# Patient Record
Sex: FEMALE | Race: WHITE | NOT HISPANIC OR LATINO | Employment: FULL TIME | URBAN - METROPOLITAN AREA
[De-identification: names, ages, dates, MRNs, and addresses within clinical notes are randomized per-mention and may not be internally consistent; named-entity substitution may affect disease eponyms.]

---

## 2020-01-10 ENCOUNTER — HOSPITAL ENCOUNTER (INPATIENT)
Facility: HOSPITAL | Age: 35
LOS: 3 days | Discharge: HOME | DRG: 699 | End: 2020-01-13
Attending: INTERNAL MEDICINE | Admitting: INTERNAL MEDICINE
Payer: MEDICAID

## 2020-01-10 DIAGNOSIS — N39.0 UTI (URINARY TRACT INFECTION): ICD-10-CM

## 2020-01-10 DIAGNOSIS — A49.9 ESBL (EXTENDED SPECTRUM BETA-LACTAMASE) PRODUCING BACTERIA INFECTION: ICD-10-CM

## 2020-01-10 DIAGNOSIS — Z16.12 ESBL (EXTENDED SPECTRUM BETA-LACTAMASE) PRODUCING BACTERIA INFECTION: ICD-10-CM

## 2020-01-10 PROBLEM — N31.9 NEUROGENIC BLADDER: Status: ACTIVE | Noted: 2020-01-10

## 2020-01-10 PROBLEM — R78.81 BACTEREMIA: Status: ACTIVE | Noted: 2020-01-10

## 2020-01-10 LAB
BASOPHILS # BLD AUTO: 0.01 K/UL (ref 0–0.2)
BASOPHILS NFR BLD: 0.2 % (ref 0–1.9)
DIFFERENTIAL METHOD: ABNORMAL
EOSINOPHIL # BLD AUTO: 0 K/UL (ref 0–0.5)
EOSINOPHIL NFR BLD: 0.7 % (ref 0–8)
ERYTHROCYTE [DISTWIDTH] IN BLOOD BY AUTOMATED COUNT: 13.6 % (ref 11.5–14.5)
HCT VFR BLD AUTO: 30.4 % (ref 37–48.5)
HGB BLD-MCNC: 9.6 G/DL (ref 12–16)
IMM GRANULOCYTES # BLD AUTO: 0.02 K/UL (ref 0–0.04)
IMM GRANULOCYTES NFR BLD AUTO: 0.3 % (ref 0–0.5)
LYMPHOCYTES # BLD AUTO: 2 K/UL (ref 1–4.8)
LYMPHOCYTES NFR BLD: 34.9 % (ref 18–48)
MCH RBC QN AUTO: 28.2 PG (ref 27–31)
MCHC RBC AUTO-ENTMCNC: 31.6 G/DL (ref 32–36)
MCV RBC AUTO: 89 FL (ref 82–98)
MONOCYTES # BLD AUTO: 0.5 K/UL (ref 0.3–1)
MONOCYTES NFR BLD: 8.2 % (ref 4–15)
NEUTROPHILS # BLD AUTO: 3.3 K/UL (ref 1.8–7.7)
NEUTROPHILS NFR BLD: 55.7 % (ref 38–73)
NRBC BLD-RTO: 0 /100 WBC
PLATELET # BLD AUTO: 356 K/UL (ref 150–350)
PMV BLD AUTO: 9.1 FL (ref 9.2–12.9)
RBC # BLD AUTO: 3.41 M/UL (ref 4–5.4)
WBC # BLD AUTO: 5.84 K/UL (ref 3.9–12.7)

## 2020-01-10 PROCEDURE — 21400001 HC TELEMETRY ROOM

## 2020-01-10 PROCEDURE — 83735 ASSAY OF MAGNESIUM: CPT

## 2020-01-10 PROCEDURE — 80053 COMPREHEN METABOLIC PANEL: CPT

## 2020-01-10 PROCEDURE — 36415 COLL VENOUS BLD VENIPUNCTURE: CPT

## 2020-01-10 PROCEDURE — 11000001 HC ACUTE MED/SURG PRIVATE ROOM

## 2020-01-10 PROCEDURE — 85025 COMPLETE CBC W/AUTO DIFF WBC: CPT

## 2020-01-10 RX ORDER — SODIUM CHLORIDE 0.9 % (FLUSH) 0.9 %
10 SYRINGE (ML) INJECTION
Status: DISCONTINUED | OUTPATIENT
Start: 2020-01-10 | End: 2020-01-13 | Stop reason: HOSPADM

## 2020-01-10 RX ORDER — SODIUM CHLORIDE 9 MG/ML
INJECTION, SOLUTION INTRAVENOUS CONTINUOUS
Status: DISCONTINUED | OUTPATIENT
Start: 2020-01-11 | End: 2020-01-13 | Stop reason: HOSPADM

## 2020-01-10 RX ORDER — ACETAMINOPHEN 325 MG/1
650 TABLET ORAL EVERY 8 HOURS PRN
Status: DISCONTINUED | OUTPATIENT
Start: 2020-01-10 | End: 2020-01-13 | Stop reason: HOSPADM

## 2020-01-10 RX ORDER — TALC
6 POWDER (GRAM) TOPICAL NIGHTLY PRN
Status: DISCONTINUED | OUTPATIENT
Start: 2020-01-11 | End: 2020-01-13 | Stop reason: HOSPADM

## 2020-01-10 RX ORDER — ONDANSETRON 2 MG/ML
4 INJECTION INTRAMUSCULAR; INTRAVENOUS EVERY 8 HOURS PRN
Status: DISCONTINUED | OUTPATIENT
Start: 2020-01-10 | End: 2020-01-13 | Stop reason: HOSPADM

## 2020-01-10 RX ORDER — MEROPENEM AND SODIUM CHLORIDE 500 MG/50ML
500 INJECTION, SOLUTION INTRAVENOUS
Status: DISCONTINUED | OUTPATIENT
Start: 2020-01-11 | End: 2020-01-12

## 2020-01-10 NOTE — PLAN OF CARE
(Physician in Lead of Transfers)   Outside Transfer Acceptance Note / Regional Referral Center      Transferring Physician: Isidoro Stallings Jr., MD    Accepting Physician: Annabelle Lane MD    Date of Acceptance: 01/10/2020    Transferring Facility: [facility]     Reason for Transfer: UTI, E.coli bacteremia     Report from Transferring Physician/Hospital course:  Patient is a 34 y.o. female who has past medical history of spina bifida presented with fever chills and weakness. She initially presented to free standing ER with these complaints 5 days ago and was found to have UTI. She then signed out AMA and was discharged with oral cipro. Blood cultures drawn during that ER visit came back positive today for MDR E.Coli. ED staff called patient back to present to ED since culture was resistant to antibiotics she was discharged with. She was febrile 100.2 and tachycardic upon presentation. She was started on IV meropenem and given IVF's. Lactic acid was 1.6. UA was again positive for nitrates, Leuk est and bacteria. She is otherwise clinically and hemodynamically stable. Patient will be admitted for IV abx treatment for MDR E.coli UTI.       Vital Signs (Most Recent):    138/79  99.0  91  18  100%RA  77KG   Vital Signs (24h Range):  BP: ()/()   Arterial Line BP: ()/()        Significant Labs:     Lactic acid 1.6    BUN/Cr 13/1.0    H/H 10/33      To Do List:   1. Admit to   2. Cont IV meropenem   3. Contact isolation for MDR bacteremia  4. Consult ID   5. Trend lactic acid  6. Repeat BC       Annabelle Lane MD  Hospital Medicine Staff

## 2020-01-11 PROBLEM — N39.0 UTI (URINARY TRACT INFECTION): Status: ACTIVE | Noted: 2020-01-11

## 2020-01-11 LAB
ALBUMIN SERPL BCP-MCNC: 2.6 G/DL (ref 3.5–5.2)
ALP SERPL-CCNC: 64 U/L (ref 55–135)
ALT SERPL W/O P-5'-P-CCNC: 15 U/L (ref 10–44)
ANION GAP SERPL CALC-SCNC: 9 MMOL/L (ref 8–16)
AST SERPL-CCNC: 11 U/L (ref 10–40)
BASOPHILS # BLD AUTO: 0.03 K/UL (ref 0–0.2)
BASOPHILS NFR BLD: 0.5 % (ref 0–1.9)
BILIRUB SERPL-MCNC: 0.2 MG/DL (ref 0.1–1)
BUN SERPL-MCNC: 9 MG/DL (ref 6–20)
CALCIUM SERPL-MCNC: 8.8 MG/DL (ref 8.7–10.5)
CHLORIDE SERPL-SCNC: 107 MMOL/L (ref 95–110)
CO2 SERPL-SCNC: 25 MMOL/L (ref 23–29)
CREAT SERPL-MCNC: 0.9 MG/DL (ref 0.5–1.4)
DIFFERENTIAL METHOD: ABNORMAL
EOSINOPHIL # BLD AUTO: 0.1 K/UL (ref 0–0.5)
EOSINOPHIL NFR BLD: 1.4 % (ref 0–8)
ERYTHROCYTE [DISTWIDTH] IN BLOOD BY AUTOMATED COUNT: 13.7 % (ref 11.5–14.5)
EST. GFR  (AFRICAN AMERICAN): >60 ML/MIN/1.73 M^2
EST. GFR  (NON AFRICAN AMERICAN): >60 ML/MIN/1.73 M^2
GLUCOSE SERPL-MCNC: 154 MG/DL (ref 70–110)
HCT VFR BLD AUTO: 34 % (ref 37–48.5)
HGB BLD-MCNC: 10.1 G/DL (ref 12–16)
IMM GRANULOCYTES # BLD AUTO: 0.03 K/UL (ref 0–0.04)
IMM GRANULOCYTES NFR BLD AUTO: 0.5 % (ref 0–0.5)
LYMPHOCYTES # BLD AUTO: 2.1 K/UL (ref 1–4.8)
LYMPHOCYTES NFR BLD: 36.7 % (ref 18–48)
MAGNESIUM SERPL-MCNC: 1.7 MG/DL (ref 1.6–2.6)
MCH RBC QN AUTO: 27.3 PG (ref 27–31)
MCHC RBC AUTO-ENTMCNC: 29.7 G/DL (ref 32–36)
MCV RBC AUTO: 92 FL (ref 82–98)
MONOCYTES # BLD AUTO: 0.6 K/UL (ref 0.3–1)
MONOCYTES NFR BLD: 10.7 % (ref 4–15)
NEUTROPHILS # BLD AUTO: 2.9 K/UL (ref 1.8–7.7)
NEUTROPHILS NFR BLD: 50.2 % (ref 38–73)
NRBC BLD-RTO: 0 /100 WBC
PLATELET # BLD AUTO: 392 K/UL (ref 150–350)
PMV BLD AUTO: 9.2 FL (ref 9.2–12.9)
POTASSIUM SERPL-SCNC: 3.9 MMOL/L (ref 3.5–5.1)
PROT SERPL-MCNC: 6.5 G/DL (ref 6–8.4)
RBC # BLD AUTO: 3.7 M/UL (ref 4–5.4)
SODIUM SERPL-SCNC: 141 MMOL/L (ref 136–145)
WBC # BLD AUTO: 5.81 K/UL (ref 3.9–12.7)

## 2020-01-11 PROCEDURE — 94761 N-INVAS EAR/PLS OXIMETRY MLT: CPT

## 2020-01-11 PROCEDURE — 63600175 PHARM REV CODE 636 W HCPCS: Performed by: INTERNAL MEDICINE

## 2020-01-11 PROCEDURE — 25000003 PHARM REV CODE 250: Performed by: NURSE PRACTITIONER

## 2020-01-11 PROCEDURE — 87040 BLOOD CULTURE FOR BACTERIA: CPT | Mod: 59

## 2020-01-11 PROCEDURE — 85025 COMPLETE CBC W/AUTO DIFF WBC: CPT

## 2020-01-11 PROCEDURE — 36415 COLL VENOUS BLD VENIPUNCTURE: CPT

## 2020-01-11 PROCEDURE — 11000001 HC ACUTE MED/SURG PRIVATE ROOM

## 2020-01-11 PROCEDURE — 63600175 PHARM REV CODE 636 W HCPCS: Performed by: NURSE PRACTITIONER

## 2020-01-11 RX ADMIN — Medication 6 MG: at 09:01

## 2020-01-11 RX ADMIN — MEROPENEM AND SODIUM CHLORIDE 500 MG: 500 INJECTION, SOLUTION INTRAVENOUS at 01:01

## 2020-01-11 RX ADMIN — MEROPENEM AND SODIUM CHLORIDE 500 MG: 500 INJECTION, SOLUTION INTRAVENOUS at 06:01

## 2020-01-11 RX ADMIN — ACETAMINOPHEN 650 MG: 325 TABLET ORAL at 04:01

## 2020-01-11 RX ADMIN — Medication 6 MG: at 12:01

## 2020-01-11 RX ADMIN — SODIUM CHLORIDE: 0.9 INJECTION, SOLUTION INTRAVENOUS at 12:01

## 2020-01-11 RX ADMIN — MEROPENEM 2 G: 1 INJECTION, POWDER, FOR SOLUTION INTRAVENOUS at 12:01

## 2020-01-11 RX ADMIN — MEROPENEM AND SODIUM CHLORIDE 500 MG: 500 INJECTION, SOLUTION INTRAVENOUS at 11:01

## 2020-01-11 RX ADMIN — SODIUM CHLORIDE: 0.9 INJECTION, SOLUTION INTRAVENOUS at 03:01

## 2020-01-11 NOTE — ASSESSMENT & PLAN NOTE
Source UTI  Repeat blood cultures x 2 in AM  Continue IV Meropenum  Inpatient consult to Infectious Disease

## 2020-01-11 NOTE — PROGRESS NOTES
[facility] Hospital Medicine  Progress Note    Patient Name: Addis Delvalle  MRN: [id number]  Patient Class: IP- Inpatient   Admission Date: 1/10/2020  Length of Stay: 1 days  Attending Physician: Jeremias Prather MD  Primary Care Provider: Madie Horn MD        Subjective:     Principal Problem:ESBL (extended spectrum beta-lactamase) producing bacteria infection        HPI:  Addis Delvalle 34 y.o. female  with a PMHx of spina bifida, neurogenic bladder with suprapubic catheter, and recurrent UTIs presented  to ED after being informed of abnormal blood culture, 01/05/20. She initially presented to free standing ER with these complaints 5 days ago and was found to have UTI. Admission was recommended at that time but she declined and elected to have oral abx. She was discharged with oral cipro. Blood cultures drawn during that ER visit came back positive today for MDR E.Coli. ED staff called patient back to present to ED since culture was resistant to antibiotics she was discharged with. She was febrile 100.2 and tachycardic upon presentation. She was started on IV meropenem and given IVF's. Lactic acid was 1.6. UA was again positive for nitrates, Leuk est and bacteria. She is otherwise clinically and hemodynamically stable. Patient will be admitted for IV abx treatment for MDR E.coli UTI.     Overview/Hospital Course:  Receiving IV merrem for E coli bacteremia (MDR). Infectious Ds consult pending. Repeat blood cultures pending. Pt performs self cath every 3 to 4 hours.     Interval History: Pt denies any symptoms currently. Discussed plan for treatment and possible disposition.     Review of Systems   Constitutional: Positive for activity change, chills and fever. Negative for appetite change.   HENT: Negative for trouble swallowing.    Eyes: Negative for visual disturbance.   Respiratory: Negative for apnea, cough, choking, chest tightness, shortness of breath, wheezing and stridor.     Cardiovascular: Negative for chest pain, palpitations and leg swelling.   Gastrointestinal: Negative for abdominal pain, constipation, diarrhea, nausea and vomiting.   Genitourinary: Positive for flank pain.   Musculoskeletal: Negative for back pain, neck pain and neck stiffness.   Skin: Negative for color change.   Neurological: Positive for weakness. Negative for dizziness, tremors, seizures, syncope, facial asymmetry, speech difficulty, light-headedness, numbness and headaches.   Psychiatric/Behavioral: Negative for agitation and behavioral problems.     Objective:     Vital Signs (Most Recent):  Temp: 98.5 °F (36.9 °C) (01/11/20 1235)  Pulse: 83 (01/11/20 1235)  Resp: 18 (01/11/20 1235)  BP: 115/62 (01/11/20 1235)  SpO2: 97 % (01/11/20 1235) Vital Signs (24h Range):  Temp:  [98.1 °F (36.7 °C)-99.4 °F (37.4 °C)] 98.5 °F (36.9 °C)  Pulse:  [74-97] 83  Resp:  [15-18] 18  SpO2:  [96 %-100 %] 97 %  BP: (115-138)/(56-79) 115/62     Weight: 77 kg (169 lb 12.1 oz)  Body mass index is 27.4 kg/m².    Intake/Output Summary (Last 24 hours) at 1/11/2020 1426  Last data filed at 1/11/2020 1133  Gross per 24 hour   Intake 1070 ml   Output 1350 ml   Net -280 ml      Physical Exam   Constitutional: She is oriented to person, place, and time. She appears well-developed. No distress.   HENT:   Head: Normocephalic and atraumatic.   Eyes: EOM are normal.   Neck: Normal range of motion. Neck supple.   Cardiovascular: Normal rate, regular rhythm and intact distal pulses.   Pulmonary/Chest: Effort normal and breath sounds normal. No stridor. No respiratory distress. She has no wheezes. She has no rales. She exhibits no tenderness.   Abdominal: Soft. Bowel sounds are normal. She exhibits no distension. There is no tenderness. There is no rebound and no guarding.   Genitourinary:   Genitourinary Comments: Deferred   Musculoskeletal: She exhibits no edema or tenderness.   Neurological: She is alert and oriented to person, place, and  time. No sensory deficit.   Skin: Skin is warm and dry. Capillary refill takes less than 2 seconds.   Psychiatric: She has a normal mood and affect. Her behavior is normal. Thought content normal.   Nursing note and vitals reviewed.      Significant Labs:   CBC:   Recent Labs   Lab 01/10/20  2312 01/11/20  0734   WBC 5.84 5.81   HGB 9.6* 10.1*   HCT 30.4* 34.0*   * 392*     CMP:   Recent Labs   Lab 01/10/20  2312      K 3.9      CO2 25   *   BUN 9   CREATININE 0.9   CALCIUM 8.8   PROT 6.5   ALBUMIN 2.6*   BILITOT 0.2   ALKPHOS 64   AST 11   ALT 15   ANIONGAP 9   EGFRNONAA >60     All pertinent labs within the past 24 hours have been reviewed.    Significant Imaging: I have reviewed all pertinent imaging results/findings within the past 24 hours.      Assessment/Plan:      * ESBL (extended spectrum beta-lactamase) producing bacteria infection  Med Surg admit  Blood cultures collected on 01/05/2020 -- (+) ESBL  Pt started on IV Meropenum at [facility] today, 01/10/20. Discussed with ID, will continue IV abx for now  Inpatient consult to Infectious Disease  Repeat blood cultures x 2 in AM        UTI (urinary tract infection)        Neurogenic bladder  Hx of spina bifida, neurogenic bladder with suprapubic catheter, and recurrent UTIs  Suprapubic catheter, self caths -- continue  Likely source of bacteremia      Bacteremia  Source UTI  Repeat blood cultures x 2 in AM  Continue IV Meropenum  Inpatient consult to Infectious Disease        VTE Risk Mitigation (From admission, onward)         Ordered     IP VTE LOW RISK PATIENT  Once      01/10/20 2211     Place sequential compression device  Until discontinued      01/10/20 2211                      Yolanda Quinn NP  Department of Hospital Medicine   [facility]

## 2020-01-11 NOTE — SUBJECTIVE & OBJECTIVE
Past Medical History:   Diagnosis Date    Spina bifida        History reviewed. No pertinent surgical history.    Review of patient's allergies indicates:  No Known Allergies    No current facility-administered medications on file prior to encounter.      No current outpatient medications on file prior to encounter.     Family History     Reviewed. Not pertinent        Tobacco Use    Smoking status: Never Smoker    Smokeless tobacco: Never Used   Substance and Sexual Activity    Alcohol use: Not on file    Drug use: Not on file    Sexual activity: Not on file     Review of Systems   Constitutional: Positive for activity change, chills and fever. Negative for appetite change.   HENT: Negative for trouble swallowing.    Eyes: Negative for visual disturbance.   Respiratory: Negative for apnea, cough, choking, chest tightness, shortness of breath, wheezing and stridor.    Cardiovascular: Negative for chest pain, palpitations and leg swelling.   Gastrointestinal: Negative for abdominal pain, constipation, diarrhea, nausea and vomiting.   Genitourinary: Positive for flank pain.   Musculoskeletal: Negative for back pain, neck pain and neck stiffness.   Skin: Negative for color change.   Neurological: Positive for weakness. Negative for dizziness, tremors, seizures, syncope, facial asymmetry, speech difficulty, light-headedness, numbness and headaches.   Psychiatric/Behavioral: Negative for agitation and behavioral problems.     Objective:     Vital Signs (Most Recent):  Temp: 99.2 °F (37.3 °C) (01/10/20 2100)  Pulse: 90 (01/10/20 2100)  Resp: 15 (01/10/20 2100)  BP: 129/66 (01/10/20 2100)  SpO2: 96 % (01/10/20 2100) Vital Signs (24h Range):  Temp:  [99 °F (37.2 °C)-99.4 °F (37.4 °C)] 99.2 °F (37.3 °C)  Pulse:  [87-91] 90  Resp:  [15-18] 15  SpO2:  [96 %-100 %] 96 %  BP: (129-138)/(66-79) 129/66     Weight: 77 kg (169 lb 12.1 oz)  Body mass index is 27.4 kg/m².    Physical Exam   Constitutional: She is oriented to  person, place, and time. She appears well-developed. No distress.   HENT:   Head: Normocephalic and atraumatic.   Eyes: EOM are normal.   Neck: Normal range of motion. Neck supple.   Cardiovascular: Normal rate, regular rhythm and intact distal pulses.   Pulmonary/Chest: Effort normal and breath sounds normal. No stridor. No respiratory distress. She has no wheezes. She has no rales. She exhibits no tenderness.   Abdominal: Soft. Bowel sounds are normal. She exhibits no distension. There is no tenderness. There is no rebound and no guarding.   Genitourinary:   Genitourinary Comments: Deferred   Musculoskeletal: She exhibits no edema or tenderness.   Neurological: She is alert and oriented to person, place, and time. No sensory deficit.   Skin: Skin is warm and dry. Capillary refill takes less than 2 seconds.   Psychiatric: She has a normal mood and affect. Her behavior is normal. Thought content normal.   Nursing note and vitals reviewed.        CRANIAL NERVES     CN III, IV, VI   Extraocular motions are normal.        Significant Labs: All pertinent labs within the past 24 hours have been reviewed.    Significant Imaging: I have reviewed all pertinent imaging results/findings within the past 24 hours.

## 2020-01-11 NOTE — ASSESSMENT & PLAN NOTE
Med Surg admit  Blood cultures collected on 01/05/2020 -- (+) ESBL  Pt started on IV Meropenum at [facility] today, 01/10/20. Discussed with ID, will continue IV abx for now  Inpatient consult to Infectious Disease  Repeat blood cultures x 2 in AM

## 2020-01-11 NOTE — H&P
[facility] Hospital Medicine  History & Physical    Patient Name: Addis Delvalle  MRN: [id number]  Admission Date: 1/10/2020  Attending Physician: Peter Pagan MD   Primary Care Provider: Madie Horn MD         Patient information was obtained from patient and ER records.     Subjective:     Principal Problem:ESBL (extended spectrum beta-lactamase) producing bacteria infection    Chief Complaint: No chief complaint on file.       HPI: Addis Delvalle 34 y.o. female  with a PMHx of spina bifida, neurogenic bladder with suprapubic catheter, and recurrent UTIs presented  to ED after being informed of abnormal blood culture, 01/05/20. She initially presented to free standing ER with these complaints 5 days ago and was found to have UTI. Admission was recommended at that time but she declined and elected to have oral abx. She was discharged with oral cipro. Blood cultures drawn during that ER visit came back positive today for MDR E.Coli. ED staff called patient back to present to ED since culture was resistant to antibiotics she was discharged with. She was febrile 100.2 and tachycardic upon presentation. She was started on IV meropenem and given IVF's. Lactic acid was 1.6. UA was again positive for nitrates, Leuk est and bacteria. She is otherwise clinically and hemodynamically stable. Patient will be admitted for IV abx treatment for MDR E.coli UTI.     Past Medical History:   Diagnosis Date    Spina bifida        History reviewed. No pertinent surgical history.    Review of patient's allergies indicates:  No Known Allergies    No current facility-administered medications on file prior to encounter.      No current outpatient medications on file prior to encounter.     Family History     Reviewed. Not pertinent        Tobacco Use    Smoking status: Never Smoker    Smokeless tobacco: Never Used   Substance and Sexual Activity    Alcohol use: Not on file    Drug use: Not on file     Sexual activity: Not on file     Review of Systems   Constitutional: Positive for activity change, chills and fever. Negative for appetite change.   HENT: Negative for trouble swallowing.    Eyes: Negative for visual disturbance.   Respiratory: Negative for apnea, cough, choking, chest tightness, shortness of breath, wheezing and stridor.    Cardiovascular: Negative for chest pain, palpitations and leg swelling.   Gastrointestinal: Negative for abdominal pain, constipation, diarrhea, nausea and vomiting.   Genitourinary: Positive for flank pain.   Musculoskeletal: Negative for back pain, neck pain and neck stiffness.   Skin: Negative for color change.   Neurological: Positive for weakness. Negative for dizziness, tremors, seizures, syncope, facial asymmetry, speech difficulty, light-headedness, numbness and headaches.   Psychiatric/Behavioral: Negative for agitation and behavioral problems.     Objective:     Vital Signs (Most Recent):  Temp: 99.2 °F (37.3 °C) (01/10/20 2100)  Pulse: 90 (01/10/20 2100)  Resp: 15 (01/10/20 2100)  BP: 129/66 (01/10/20 2100)  SpO2: 96 % (01/10/20 2100) Vital Signs (24h Range):  Temp:  [99 °F (37.2 °C)-99.4 °F (37.4 °C)] 99.2 °F (37.3 °C)  Pulse:  [87-91] 90  Resp:  [15-18] 15  SpO2:  [96 %-100 %] 96 %  BP: (129-138)/(66-79) 129/66     Weight: 77 kg (169 lb 12.1 oz)  Body mass index is 27.4 kg/m².    Physical Exam   Constitutional: She is oriented to person, place, and time. She appears well-developed. No distress.   HENT:   Head: Normocephalic and atraumatic.   Eyes: EOM are normal.   Neck: Normal range of motion. Neck supple.   Cardiovascular: Normal rate, regular rhythm and intact distal pulses.   Pulmonary/Chest: Effort normal and breath sounds normal. No stridor. No respiratory distress. She has no wheezes. She has no rales. She exhibits no tenderness.   Abdominal: Soft. Bowel sounds are normal. She exhibits no distension. There is no tenderness. There is no rebound and no  guarding.   Genitourinary:   Genitourinary Comments: Deferred   Musculoskeletal: She exhibits no edema or tenderness.   Neurological: She is alert and oriented to person, place, and time. No sensory deficit.   Skin: Skin is warm and dry. Capillary refill takes less than 2 seconds.   Psychiatric: She has a normal mood and affect. Her behavior is normal. Thought content normal.   Nursing note and vitals reviewed.        CRANIAL NERVES     CN III, IV, VI   Extraocular motions are normal.        Significant Labs: All pertinent labs within the past 24 hours have been reviewed.    Significant Imaging: I have reviewed all pertinent imaging results/findings within the past 24 hours.    Assessment/Plan:     * ESBL (extended spectrum beta-lactamase) producing bacteria infection  Med Surg admit  Blood cultures collected on 01/05/2020 -- (+) ESBL  Pt started on IV Meropenum at [facility] today, 01/10/20. Discussed with ID, will continue IV abx for now  Inpatient consult to Infectious Disease  Repeat blood cultures x 2 in AM        Neurogenic bladder  Hx of spina bifida, neurogenic bladder with suprapubic catheter, and recurrent UTIs  Suprapubic catheter, self caths -- continue  Likely source of bacteremia      Bacteremia  Source UTI  Repeat blood cultures x 2 in AM  Continue IV Meropenum  Inpatient consult to Infectious Disease        VTE Risk Mitigation (From admission, onward)         Ordered     IP VTE LOW RISK PATIENT  Once      01/10/20 2211     Place sequential compression device  Until discontinued      01/10/20 2211                   CAITIE De Dios  Department of Hospital Medicine   [facility]

## 2020-01-11 NOTE — HPI
Addis Delvalle 34 y.o. female with a PMHx of spina bifida, neurogenic bladder with suprapubic catheter, and recurrent UTIs presented to ED after being informed of abnormal blood culture, 01/05/20. She initially presented to free standing ER with these complaints 5 days ago and was found to have UTI. Admission was recommended at that time but she declined and elected to have oral abx. She was discharged with oral cipro. Blood cultures drawn during that ER visit came back positive today for MDR E.Coli. ED staff called patient back to present to ED since culture was resistant to antibiotics she was discharged with. She was febrile 100.2 and tachycardic upon presentation. She was started on IV meropenem and given IVF's. Lactic acid was 1.6. UA was again positive for nitrates, Leuk est and bacteria. She is otherwise clinically and hemodynamically stable. Patient will be admitted for IV abx treatment for MDR E.coli UTI.

## 2020-01-11 NOTE — PLAN OF CARE
SW with patient at bedside. Patient is independent with adls and iadls. Pt reports having financial difficulty affording self catheter supplies. Pt previously under her father's insurance which did not cover needed self catheter supplies. Pt now has Medicaid and advised that she would contact insurance company to see if her insurance will cover needed supplies. SW TC to [employer] where pt purchases needed supplies to inquire about insurance coverage for catheter supplies. SW was advised that pt's insurance will not cover supplies at that particular pharmacy, but advised that pt can purchase supplies at out of pocket cost of $30 plus tax. Pt reports that due to financial limitations, she as been cleaning/washing her catheters and reusing them. SW undated pt and advised that she contact her insurance company to enquire about coverage for needed supplies. SW also informed pt about dates for open enrollment with Medicaid as she expressed that she is not satisfied with current insurance provider and would like to change her provider. Updated white board with 's name and number. Transitional Care Folder, Discharge Planning Begins on Admission pamphlet, [facility] Bedside Delivery pamphlet, Advance Directive information given to patient along with the contact information given.Instructed patient to call with any questions or concerns.     D/C Plan: Pt will return home to prior living arrangement  PCP: Madie Horn MD  Preferred Pharmacy: [facility], [address] [place] [address], [place] [address]  Discharge transportation: Family will provide transportation home  [facility]: Inactive  Pharmacy Bedside Delivery: Pt would like bedside medication delivery     01/11/20 1126   Discharge Assessment   Assessment Type Discharge Planning Assessment   Confirmed/corrected address and phone number on facesheet? Yes   Assessment information obtained from? Patient   Expected Length of Stay (days)    (TBD)   Communicated expected length of stay with patient/caregiver yes   Prior to hospitilization cognitive status: Alert/Oriented   Prior to hospitalization functional status: Independent   Current cognitive status: Alert/Oriented   Current Functional Status: Independent   Facility Arrived From: Pt arrived by ambulance form [facility] in [place], [place]   Lives With other relative(s)   Able to Return to Prior Arrangements yes   Is patient able to care for self after discharge? Yes   Who are your caregiver(s) and their phone number(s)? Lana Delvalle (mother) [phone]   Patient's perception of discharge disposition home or selfcare   Readmission Within the Last 30 Days no previous admission in last 30 days   Patient currently being followed by outpatient case management? No   Patient currently receives any other outside agency services? No   Equipment Currently Used at Home none   Do you have any problems affording any of your prescribed medications? No   Is the patient taking medications as prescribed? yes   Does the patient have transportation home? Yes   Transportation Anticipated family or friend will provide   Does the patient receive services at the [facility] Clinic? No   Discharge Plan A Home   Discharge Plan B Home   DME Needed Upon Discharge  none   Patient/Family in Agreement with Plan yes

## 2020-01-11 NOTE — ASSESSMENT & PLAN NOTE
Hx of spina bifida, neurogenic bladder with suprapubic catheter, and recurrent UTIs  Suprapubic catheter, self caths -- continue  Likely source of bacteremia

## 2020-01-11 NOTE — HOSPITAL COURSE
Receiving IV merrem for E coli bacteremia (MDR). Infectious Ds consult pending. Repeat blood cultures pending. Pt performs self cath every 3 to 4 hours.   1/12/2020 - repeat blood cultures - NGTD. Infectious Ds determined plan of IV Ertapenem daily x 2 weeks. PICC line placed.  assisting with ABX. [employer] cannot teach patient until tomorrow 1/13/2020 - will plan for discharge then. Pt to follow up with Dr. English.  1/13/2020 - [employer] was able to perform bedside teaching for Ertapenem administration. Pt was seen and examined and determined to be safe and stable for discharge. Also, she was given a prescription for supply of in and out caths. Pt was advised to follow up with Dr. English and her PCP.

## 2020-01-11 NOTE — ASSESSMENT & PLAN NOTE
Med Surg admit  Blood cultures collected on 01/05/2020 -- (+) ESBL  Pt started on IV Meropenum at [facility] today, 01/10/20. Discussed with ID, will continue IV abx for now  Inpatient consult to Infectious Disease  Repeat blood cultures x 2 in AM

## 2020-01-11 NOTE — PLAN OF CARE
Pt denies pain. PT has remained afebrile. Iv fluids are infusing. IV given per order. Pt self cath ( pt has not supplies, given via hospital). No injuries. Will continue to monitor. 12 hour chart check is completed.

## 2020-01-12 PROBLEM — N12 PYELONEPHRITIS: Status: ACTIVE | Noted: 2020-01-12

## 2020-01-12 LAB
BASOPHILS # BLD AUTO: 0.02 K/UL (ref 0–0.2)
BASOPHILS NFR BLD: 0.3 % (ref 0–1.9)
DIFFERENTIAL METHOD: ABNORMAL
EOSINOPHIL # BLD AUTO: 0.1 K/UL (ref 0–0.5)
EOSINOPHIL NFR BLD: 1.7 % (ref 0–8)
ERYTHROCYTE [DISTWIDTH] IN BLOOD BY AUTOMATED COUNT: 13.5 % (ref 11.5–14.5)
HCT VFR BLD AUTO: 35.7 % (ref 37–48.5)
HGB BLD-MCNC: 10.8 G/DL (ref 12–16)
IMM GRANULOCYTES # BLD AUTO: 0.03 K/UL (ref 0–0.04)
IMM GRANULOCYTES NFR BLD AUTO: 0.4 % (ref 0–0.5)
LYMPHOCYTES # BLD AUTO: 2.2 K/UL (ref 1–4.8)
LYMPHOCYTES NFR BLD: 30.6 % (ref 18–48)
MCH RBC QN AUTO: 27.6 PG (ref 27–31)
MCHC RBC AUTO-ENTMCNC: 30.3 G/DL (ref 32–36)
MCV RBC AUTO: 91 FL (ref 82–98)
MONOCYTES # BLD AUTO: 0.6 K/UL (ref 0.3–1)
MONOCYTES NFR BLD: 8.5 % (ref 4–15)
NEUTROPHILS # BLD AUTO: 4.2 K/UL (ref 1.8–7.7)
NEUTROPHILS NFR BLD: 58.5 % (ref 38–73)
NRBC BLD-RTO: 0 /100 WBC
PLATELET # BLD AUTO: 374 K/UL (ref 150–350)
PMV BLD AUTO: 8.9 FL (ref 9.2–12.9)
RBC # BLD AUTO: 3.91 M/UL (ref 4–5.4)
WBC # BLD AUTO: 7.19 K/UL (ref 3.9–12.7)

## 2020-01-12 PROCEDURE — 63600175 PHARM REV CODE 636 W HCPCS: Performed by: INTERNAL MEDICINE

## 2020-01-12 PROCEDURE — 25000003 PHARM REV CODE 250: Performed by: NURSE PRACTITIONER

## 2020-01-12 PROCEDURE — 11000001 HC ACUTE MED/SURG PRIVATE ROOM

## 2020-01-12 PROCEDURE — C1751 CATH, INF, PER/CENT/MIDLINE: HCPCS

## 2020-01-12 PROCEDURE — 63600175 PHARM REV CODE 636 W HCPCS: Performed by: NURSE PRACTITIONER

## 2020-01-12 PROCEDURE — 36415 COLL VENOUS BLD VENIPUNCTURE: CPT

## 2020-01-12 PROCEDURE — 85025 COMPLETE CBC W/AUTO DIFF WBC: CPT

## 2020-01-12 PROCEDURE — 36569 INSJ PICC 5 YR+ W/O IMAGING: CPT

## 2020-01-12 RX ADMIN — SODIUM CHLORIDE: 0.9 INJECTION, SOLUTION INTRAVENOUS at 04:01

## 2020-01-12 RX ADMIN — Medication 6 MG: at 09:01

## 2020-01-12 RX ADMIN — MEROPENEM AND SODIUM CHLORIDE 500 MG: 500 INJECTION, SOLUTION INTRAVENOUS at 05:01

## 2020-01-12 RX ADMIN — ERTAPENEM 1 G: 1 INJECTION INTRAMUSCULAR; INTRAVENOUS at 11:01

## 2020-01-12 NOTE — PROCEDURES
"Addis Delvalle is a 34 y.o. female patient.    Temp: 98.3 °F (36.8 °C) (01/12/20 1234)  Pulse: 78 (01/12/20 1235)  Resp: 18 (01/12/20 1234)  BP: 122/69 (01/12/20 1235)  SpO2: 99 % (01/12/20 1234)  Weight: 77 kg (169 lb 12.1 oz) (01/10/20 2100)  Height: 5' 6" (167.6 cm) (01/10/20 2100)    PICC  Date/Time: 1/12/2020 12:26 PM  Performed by: Chris Ashley RN  Supervising provider: Twila Locke RN  Consent Done: Yes  Time out: Immediately prior to procedure a time out was called to verify the correct patient, procedure, equipment, support staff and site/side marked as required  Indications: med administration and vascular access  Anesthesia: local infiltration  Local anesthetic: lidocaine 1% without epinephrine  Anesthetic Total (mL): 3  Preparation: skin prepped with ChloraPrep  Skin prep agent dried: skin prep agent completely dried prior to procedure  Sterile barriers: all five maximum sterile barriers used - cap, mask, sterile gown, sterile gloves, and large sterile sheet  Hand hygiene: hand hygiene performed prior to central venous catheter insertion  Location details: right basilic  Catheter type: double lumen  Catheter size: 5 Fr  Catheter Length: 38cm    Ultrasound guidance: yes  Vessel Caliber: medium and patent, compressibility normal  Vascular Doppler: not done  Needle advanced into vessel with real time Ultrasound guidance.  Guidewire confirmed in vessel.  Sterile sheath used.  no esophageal manometryNumber of attempts: 1  Post-procedure: blood return through all ports, chlorhexidine patch and sterile dressing applied  Estimated blood loss (mL): 0  Specimens: No  Implants: No  Assessment: placement verified by x-ray  Tip Termination Explanation: see rad. report  Complications: none  Comments: Do not use until placement verified by MD Chris Ashley  1/12/2020    "

## 2020-01-12 NOTE — SUBJECTIVE & OBJECTIVE
Past Medical History:   Diagnosis Date    Spina bifida        History reviewed. No pertinent surgical history.    Review of patient's allergies indicates:  No Known Allergies    Medications:  No medications prior to admission.     Antibiotics (From admission, onward)    Start     Stop Route Frequency Ordered    01/11/20 0630  meropenem-0.9% sodium chloride 500 mg/50 mL IVPB      -- IV Every 6 hours (non-standard times) 01/10/20 2336        Antifungals (From admission, onward)    None        Antivirals (From admission, onward)    None             There is no immunization history on file for this patient.    Family History     None        Social History     Socioeconomic History    Marital status: Single     Spouse name: Not on file    Number of children: Not on file    Years of education: Not on file    Highest education level: Not on file   Occupational History    Not on file   Social Needs    Financial resource strain: Not on file    Food insecurity:     Worry: Not on file     Inability: Not on file    Transportation needs:     Medical: Not on file     Non-medical: Not on file   Tobacco Use    Smoking status: Never Smoker    Smokeless tobacco: Never Used   Substance and Sexual Activity    Alcohol use: Not on file    Drug use: Not on file    Sexual activity: Not on file   Lifestyle    Physical activity:     Days per week: Not on file     Minutes per session: Not on file    Stress: Not on file   Relationships    Social connections:     Talks on phone: Not on file     Gets together: Not on file     Attends Alevism service: Not on file     Active member of club or organization: Not on file     Attends meetings of clubs or organizations: Not on file     Relationship status: Not on file   Other Topics Concern    Not on file   Social History Narrative    Not on file     Review of Systems   Constitutional: Positive for activity change, chills and fever. Negative for appetite change.   HENT: Negative  for trouble swallowing.    Eyes: Negative for visual disturbance.   Respiratory: Negative for apnea, cough, choking, chest tightness, shortness of breath, wheezing and stridor.    Cardiovascular: Negative for chest pain, palpitations and leg swelling.   Gastrointestinal: Negative for abdominal pain, constipation, diarrhea, nausea and vomiting.   Genitourinary: Positive for flank pain.   Musculoskeletal: Negative for back pain, neck pain and neck stiffness.   Skin: Negative for color change.   Neurological: Positive for weakness. Negative for dizziness, tremors, seizures, syncope, facial asymmetry, speech difficulty, light-headedness, numbness and headaches.   Psychiatric/Behavioral: Negative for agitation and behavioral problems.     Objective:     Vital Signs (Most Recent):  Temp: 97.8 °F (36.6 °C) (01/12/20 0423)  Pulse: 78 (01/12/20 0423)  Resp: 18 (01/12/20 0423)  BP: 104/62 (01/12/20 0423)  SpO2: 96 % (01/12/20 0423) Vital Signs (24h Range):  Temp:  [97.8 °F (36.6 °C)-98.5 °F (36.9 °C)] 97.8 °F (36.6 °C)  Pulse:  [64-86] 78  Resp:  [16-18] 18  SpO2:  [96 %-100 %] 96 %  BP: (104-131)/(58-82) 104/62     Weight: 77 kg (169 lb 12.1 oz)  Body mass index is 27.4 kg/m².    Estimated Creatinine Clearance: 92.3 mL/min (based on SCr of 0.9 mg/dL).    Physical Exam   Constitutional: She is oriented to person, place, and time. She appears well-developed. No distress.   HENT:   Head: Normocephalic and atraumatic.   Eyes: EOM are normal.   Neck: Normal range of motion. Neck supple.   Cardiovascular: Normal rate, regular rhythm and intact distal pulses.   Pulmonary/Chest: Effort normal and breath sounds normal. No stridor. No respiratory distress. She has no wheezes. She has no rales. She exhibits no tenderness.   Abdominal: Soft. Bowel sounds are normal. She exhibits no distension. There is no tenderness. There is no rebound and no guarding.   Genitourinary:   Genitourinary Comments: Deferred   Musculoskeletal: She exhibits  no edema or tenderness.   Neurological: She is alert and oriented to person, place, and time. No sensory deficit.   Skin: Skin is warm and dry. Capillary refill takes less than 2 seconds.   Psychiatric: She has a normal mood and affect. Her behavior is normal. Thought content normal.   Nursing note and vitals reviewed.      Significant Labs: Urine Culture: No results for input(s): LABURIN in the last 4320 hours.  All pertinent labs within the past 24 hours have been reviewed.    Significant Imaging: I have reviewed all pertinent imaging results/findings within the past 24 hours.

## 2020-01-12 NOTE — PROGRESS NOTES
[facility]  Infectious Disease  Progress Note    Patient Name: Addis Delvalle  MRN: [id number]  Admission Date: 1/10/2020  Length of Stay: 2 days  Attending Physician: Jeremias Prather MD  Primary Care Provider: Madie Horn MD    Isolation Status: Contact  Assessment/Plan:      * ESBL (extended spectrum beta-lactamase) producing bacteria infection  Will continue meropenem, repeat blood culture .     1/12- will complete 2 weeks of Ertapenem -IM or IV option     Pyelonephritis  Will plan to complete 2 weeks of meropenem   Repeat blood culture    1/12- will switch to ertapenem   Will plan to complete 2 weeks of therapy -IM or IV.  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  IM option can be done .  She will need to come daily to get her IM shot .  PICC line can also be done and she gets once daily Ertapenem     Bacteremia  Isolate is MDR E coli-will continue meropenem     1/12- please see plan above        Anticipated Disposition:     Thank you for your consult. I will follow-up with patient. Please contact us if you have any additional questions.    Lui English MD  Infectious Disease  [facility]    Subjective:     Principal Problem:ESBL (extended spectrum beta-lactamase) producing bacteria infection    HPI:   34 y.o. female  with a PMHx of spina bifida, neurogenic bladder with suprapubic catheter, and recurrent UTIs presented  to ED after being informed of abnormal blood culture, 01/05/20. She initially presented to free standing ER with these complaints 5 days ago and was  discharged with oral cipro. Blood cultures drawn during that ER visit came back positive today for MDR E.Coli.  01/05-    Amikacin MARIANNE 8 ug/ml: Susceptible   Ampicillin MARIANNE >=32 ug/ml: Resistant   Ampicillin + Sulbactam MARIANNE >=32 ug/ml: Resistant   Ceftriaxone MARIANNE >=64 ug/ml: Resistant   ESBL MARIANNE pos ug/ml: Pos   Gentamicin MARIANNE >=16 ug/ml: Resistant   Levofloxacin MARIANNE >=8 ug/ml:  Resistant   Piperacillin + Tazobactam MARIANNE 8 ug/ml: Susceptible   Meropenem MARIANNE <=0.25 ug/ml: Susceptible   Tobramycin MARIANNE >=16 ug/ml: Resistant   Trimethoprim + Sulfamethoxazole MARIANNE <=20 ug/ml: Susceptible   Cefepime REID-DELGADILLO Resistant     CBC -1/5- wbc 25.7  CT scan of abdomen  -1/5-  1. Moderate to moderate severe dilation of calyces right kidney, greatest in the mid and upper pole. Multiple calculi within the right kidney measuring up to 17 mm. Diffuse cortical thinning of kidney. Heterogeneous enhancement of kidney with some minimal stranding suggesting polynephritis. Right ureter proximally and distally is nondilated but is somewhat distended the midportion. No calculi within the ureter.    2. Distended somewhat folded thickened urinary bladder having the appearance of neurogenic bladder.    3. Left kidney and ureter are not identified.     4. Multiple prominent contrast enhancing retroperitoneal lymph nodes and a few mildly prominent enhancing mesenteric lymph nodes which may be reactive or inflammatory/infectious in etiology. Moderate stool throughout large bowel.  Since admission,she was started on meropenem .  Interval History:   34 year old woman with spina bifida, ESBL E coli bacteremia .  Repeat blood culture is negative  Review of Systems   Constitutional: Positive for activity change, chills and fever. Negative for appetite change.   HENT: Negative for trouble swallowing.    Eyes: Negative for visual disturbance.   Respiratory: Negative for apnea, cough, choking, chest tightness, shortness of breath, wheezing and stridor.    Cardiovascular: Negative for chest pain, palpitations and leg swelling.   Gastrointestinal: Negative for abdominal pain, constipation, diarrhea, nausea and vomiting.   Genitourinary: Positive for flank pain.   Musculoskeletal: Negative for back pain, neck pain and neck stiffness.   Skin: Negative for color change.   Neurological: Positive for weakness. Negative for dizziness,  tremors, seizures, syncope, facial asymmetry, speech difficulty, light-headedness, numbness and headaches.   Psychiatric/Behavioral: Negative for agitation and behavioral problems.     Objective:     Vital Signs (Most Recent):  Temp: 97.8 °F (36.6 °C) (01/12/20 0423)  Pulse: 78 (01/12/20 0423)  Resp: 18 (01/12/20 0423)  BP: 104/62 (01/12/20 0423)  SpO2: 96 % (01/12/20 0423) Vital Signs (24h Range):  Temp:  [97.8 °F (36.6 °C)-98.5 °F (36.9 °C)] 97.8 °F (36.6 °C)  Pulse:  [64-86] 78  Resp:  [16-18] 18  SpO2:  [96 %-100 %] 96 %  BP: (104-130)/(58-82) 104/62     Weight: 77 kg (169 lb 12.1 oz)  Body mass index is 27.4 kg/m².    Estimated Creatinine Clearance: 92.3 mL/min (based on SCr of 0.9 mg/dL).    Physical Exam   Constitutional: She is oriented to person, place, and time. She appears well-developed. No distress.   HENT:   Head: Normocephalic and atraumatic.   Eyes: EOM are normal.   Neck: Normal range of motion. Neck supple.   Cardiovascular: Normal rate, regular rhythm and intact distal pulses.   Pulmonary/Chest: Effort normal and breath sounds normal. No stridor. No respiratory distress. She has no wheezes. She has no rales. She exhibits no tenderness.   Abdominal: Soft. Bowel sounds are normal. She exhibits no distension. There is no tenderness. There is no rebound and no guarding.   Genitourinary:   Genitourinary Comments: Deferred   Musculoskeletal: She exhibits no edema or tenderness.   Neurological: She is alert and oriented to person, place, and time. No sensory deficit.   Skin: Skin is warm and dry. Capillary refill takes less than 2 seconds.   Psychiatric: She has a normal mood and affect. Her behavior is normal. Thought content normal.   Nursing note and vitals reviewed.      Significant Labs:   Blood Culture:   Recent Labs   Lab 01/11/20  0743   LABBLOO No Growth to date  No Growth to date     BMP:   Recent Labs   Lab 01/10/20  2312   *      K 3.9      CO2 25   BUN 9   CREATININE 0.9    CALCIUM 8.8   MG 1.7     CBC:   Recent Labs   Lab 01/10/20  2312 01/11/20  0734 01/12/20  0542   WBC 5.84 5.81 7.19   HGB 9.6* 10.1* 10.8*   HCT 30.4* 34.0* 35.7*   * 392* 374*     Wound Culture: No results for input(s): LABAERO in the last 4320 hours.  All pertinent labs within the past 24 hours have been reviewed.    Significant Imaging: I have reviewed all pertinent imaging results/findings within the past 24 hours.

## 2020-01-12 NOTE — ASSESSMENT & PLAN NOTE
Med Surg admit  Blood cultures collected on 01/05/2020 -- (+) ESBL  Pt started on IV Meropenum at [facility] today, 01/10/20. Discussed with ID, will continue IV abx for now  Inpatient consult to Infectious Disease  Repeat blood cultures x 2 in AM

## 2020-01-12 NOTE — HPI
34 y.o. female  with a PMHx of spina bifida, neurogenic bladder with suprapubic catheter, and recurrent UTIs presented  to ED after being informed of abnormal blood culture, 01/05/20. She initially presented to free standing ER with these complaints 5 days ago and was  discharged with oral cipro. Blood cultures drawn during that ER visit came back positive today for MDR E.Coli.  01/05-    Amikacin MARIANNE 8 ug/ml: Susceptible   Ampicillin MARIANNE >=32 ug/ml: Resistant   Ampicillin + Sulbactam MARIANNE >=32 ug/ml: Resistant   Ceftriaxone MARIANNE >=64 ug/ml: Resistant   ESBL MARAINNE pos ug/ml: Pos   Gentamicin MARIANNE >=16 ug/ml: Resistant   Levofloxacin MARIANNE >=8 ug/ml: Resistant   Piperacillin + Tazobactam MARIANNE 8 ug/ml: Susceptible   Meropenem MARIANNE <=0.25 ug/ml: Susceptible   Tobramycin MARIANNE >=16 ug/ml: Resistant   Trimethoprim + Sulfamethoxazole MARIANNE <=20 ug/ml: Susceptible   Cefepime REID-DELGADILLO Resistant     CBC -1/5- wbc 25.7  CT scan of abdomen  -1/5-  1. Moderate to moderate severe dilation of calyces right kidney, greatest in the mid and upper pole. Multiple calculi within the right kidney measuring up to 17 mm. Diffuse cortical thinning of kidney. Heterogeneous enhancement of kidney with some minimal stranding suggesting polynephritis. Right ureter proximally and distally is nondilated but is somewhat distended the midportion. No calculi within the ureter.    2. Distended somewhat folded thickened urinary bladder having the appearance of neurogenic bladder.    3. Left kidney and ureter are not identified.     4. Multiple prominent contrast enhancing retroperitoneal lymph nodes and a few mildly prominent enhancing mesenteric lymph nodes which may be reactive or inflammatory/infectious in etiology. Moderate stool throughout large bowel.  Since admission,she was started on meropenem .

## 2020-01-12 NOTE — SUBJECTIVE & OBJECTIVE
Interval History:   34 year old woman with spina bifida, ESBL E coli bacteremia .  Repeat blood culture is negative  Review of Systems   Constitutional: Positive for activity change, chills and fever. Negative for appetite change.   HENT: Negative for trouble swallowing.    Eyes: Negative for visual disturbance.   Respiratory: Negative for apnea, cough, choking, chest tightness, shortness of breath, wheezing and stridor.    Cardiovascular: Negative for chest pain, palpitations and leg swelling.   Gastrointestinal: Negative for abdominal pain, constipation, diarrhea, nausea and vomiting.   Genitourinary: Positive for flank pain.   Musculoskeletal: Negative for back pain, neck pain and neck stiffness.   Skin: Negative for color change.   Neurological: Positive for weakness. Negative for dizziness, tremors, seizures, syncope, facial asymmetry, speech difficulty, light-headedness, numbness and headaches.   Psychiatric/Behavioral: Negative for agitation and behavioral problems.     Objective:     Vital Signs (Most Recent):  Temp: 97.8 °F (36.6 °C) (01/12/20 0423)  Pulse: 78 (01/12/20 0423)  Resp: 18 (01/12/20 0423)  BP: 104/62 (01/12/20 0423)  SpO2: 96 % (01/12/20 0423) Vital Signs (24h Range):  Temp:  [97.8 °F (36.6 °C)-98.5 °F (36.9 °C)] 97.8 °F (36.6 °C)  Pulse:  [64-86] 78  Resp:  [16-18] 18  SpO2:  [96 %-100 %] 96 %  BP: (104-130)/(58-82) 104/62     Weight: 77 kg (169 lb 12.1 oz)  Body mass index is 27.4 kg/m².    Estimated Creatinine Clearance: 92.3 mL/min (based on SCr of 0.9 mg/dL).    Physical Exam   Constitutional: She is oriented to person, place, and time. She appears well-developed. No distress.   HENT:   Head: Normocephalic and atraumatic.   Eyes: EOM are normal.   Neck: Normal range of motion. Neck supple.   Cardiovascular: Normal rate, regular rhythm and intact distal pulses.   Pulmonary/Chest: Effort normal and breath sounds normal. No stridor. No respiratory distress. She has no wheezes. She has no  rales. She exhibits no tenderness.   Abdominal: Soft. Bowel sounds are normal. She exhibits no distension. There is no tenderness. There is no rebound and no guarding.   Genitourinary:   Genitourinary Comments: Deferred   Musculoskeletal: She exhibits no edema or tenderness.   Neurological: She is alert and oriented to person, place, and time. No sensory deficit.   Skin: Skin is warm and dry. Capillary refill takes less than 2 seconds.   Psychiatric: She has a normal mood and affect. Her behavior is normal. Thought content normal.   Nursing note and vitals reviewed.      Significant Labs:   Blood Culture:   Recent Labs   Lab 01/11/20  0743   LABBLOO No Growth to date  No Growth to date     BMP:   Recent Labs   Lab 01/10/20  2312   *      K 3.9      CO2 25   BUN 9   CREATININE 0.9   CALCIUM 8.8   MG 1.7     CBC:   Recent Labs   Lab 01/10/20  2312 01/11/20  0734 01/12/20  0542   WBC 5.84 5.81 7.19   HGB 9.6* 10.1* 10.8*   HCT 30.4* 34.0* 35.7*   * 392* 374*     Wound Culture: No results for input(s): LABAERO in the last 4320 hours.  All pertinent labs within the past 24 hours have been reviewed.    Significant Imaging: I have reviewed all pertinent imaging results/findings within the past 24 hours.

## 2020-01-12 NOTE — PROGRESS NOTES
[facility] Hospital Medicine  Progress Note    Patient Name: Addis Delvalle  MRN: [id number]  Patient Class: IP- Inpatient   Admission Date: 1/10/2020  Length of Stay: 2 days  Attending Physician: Jeremias Prather MD  Primary Care Provider: Madie Horn MD        Subjective:     Principal Problem:ESBL (extended spectrum beta-lactamase) producing bacteria infection        HPI:  Addis Delvalle 34 y.o. female  with a PMHx of spina bifida, neurogenic bladder with suprapubic catheter, and recurrent UTIs presented  to ED after being informed of abnormal blood culture, 01/05/20. She initially presented to free standing ER with these complaints 5 days ago and was found to have UTI. Admission was recommended at that time but she declined and elected to have oral abx. She was discharged with oral cipro. Blood cultures drawn during that ER visit came back positive today for MDR E.Coli. ED staff called patient back to present to ED since culture was resistant to antibiotics she was discharged with. She was febrile 100.2 and tachycardic upon presentation. She was started on IV meropenem and given IVF's. Lactic acid was 1.6. UA was again positive for nitrates, Leuk est and bacteria. She is otherwise clinically and hemodynamically stable. Patient will be admitted for IV abx treatment for MDR E.coli UTI.     Overview/Hospital Course:  Receiving IV merrem for E coli bacteremia (MDR). Infectious Ds consult pending. Repeat blood cultures pending. Pt performs self cath every 3 to 4 hours.   1/12/2020 - repeat blood cultures - NGTD. Infectious Ds determined plan of IV Ertapenem daily x 2 weeks. PICC line placed.  assisting with ABX. [employer] cannot teach patient until tomorrow 1/13/2020 - will plan for discharge then. Pt to follow up with Dr. English.      Interval History: No complaints today. Pt agreeable to PICC line placement. Plans for discharge tomorrow.     Review of Systems    Constitutional: Positive for activity change, chills and fever. Negative for appetite change.   HENT: Negative for trouble swallowing.    Eyes: Negative for visual disturbance.   Respiratory: Negative for apnea, cough, choking, chest tightness, shortness of breath, wheezing and stridor.    Cardiovascular: Negative for chest pain, palpitations and leg swelling.   Gastrointestinal: Negative for abdominal pain, constipation, diarrhea, nausea and vomiting.   Genitourinary: Negative for flank pain.   Musculoskeletal: Negative for back pain, neck pain and neck stiffness.   Skin: Negative for color change.   Neurological: Positive for weakness. Negative for dizziness, tremors, seizures, syncope, facial asymmetry, speech difficulty, light-headedness, numbness and headaches.   Psychiatric/Behavioral: Negative for agitation and behavioral problems.     Objective:     Vital Signs (Most Recent):  Temp: 98.3 °F (36.8 °C) (01/12/20 1234)  Pulse: 78 (01/12/20 1235)  Resp: 18 (01/12/20 1234)  BP: 122/69 (01/12/20 1235)  SpO2: 99 % (01/12/20 1234) Vital Signs (24h Range):  Temp:  [97.8 °F (36.6 °C)-98.5 °F (36.9 °C)] 98.3 °F (36.8 °C)  Pulse:  [64-87] 78  Resp:  [16-18] 18  SpO2:  [96 %-100 %] 99 %  BP: (104-130)/(58-82) 122/69     Weight: 77 kg (169 lb 12.1 oz)  Body mass index is 27.4 kg/m².    Intake/Output Summary (Last 24 hours) at 1/12/2020 1547  Last data filed at 1/12/2020 0555  Gross per 24 hour   Intake 2965 ml   Output 2600 ml   Net 365 ml      Physical Exam   Constitutional: She is oriented to person, place, and time. She appears well-developed. No distress.   HENT:   Head: Normocephalic and atraumatic.   Eyes: EOM are normal.   Neck: Normal range of motion. Neck supple.   Cardiovascular: Normal rate, regular rhythm and intact distal pulses.   Pulmonary/Chest: Effort normal and breath sounds normal. No stridor. No respiratory distress. She has no wheezes. She has no rales. She exhibits no tenderness.   Abdominal: Soft.  Bowel sounds are normal. She exhibits no distension. There is no tenderness. There is no rebound and no guarding.   Genitourinary:   Genitourinary Comments: Deferred   Musculoskeletal: She exhibits no edema or tenderness.   Neurological: She is alert and oriented to person, place, and time. No sensory deficit.   Skin: Skin is warm and dry. Capillary refill takes less than 2 seconds.   Psychiatric: She has a normal mood and affect. Her behavior is normal. Thought content normal.   Nursing note and vitals reviewed.      Significant Labs:   CBC:   Recent Labs   Lab 01/10/20  2312 01/11/20  0734 01/12/20  0542   WBC 5.84 5.81 7.19   HGB 9.6* 10.1* 10.8*   HCT 30.4* 34.0* 35.7*   * 392* 374*     CMP:   Recent Labs   Lab 01/10/20  2312      K 3.9      CO2 25   *   BUN 9   CREATININE 0.9   CALCIUM 8.8   PROT 6.5   ALBUMIN 2.6*   BILITOT 0.2   ALKPHOS 64   AST 11   ALT 15   ANIONGAP 9   EGFRNONAA >60     All pertinent labs within the past 24 hours have been reviewed.    Significant Imaging: I have reviewed all pertinent imaging results/findings within the past 24 hours.      Assessment/Plan:      * ESBL (extended spectrum beta-lactamase) producing bacteria infection  Med Surg admit  Blood cultures collected on 01/05/2020 -- (+) ESBL  Pt started on IV Meropenum at [facility] today, 01/10/20. Discussed with ID, will continue IV abx for now  Inpatient consult to Infectious Disease  Repeat blood cultures x 2 in AM        Pyelonephritis        UTI (urinary tract infection)        Neurogenic bladder  Hx of spina bifida, neurogenic bladder with suprapubic catheter, and recurrent UTIs  Suprapubic catheter, self caths -- continue  Likely source of bacteremia      Bacteremia  Source UTI  Repeat blood cultures x 2 in AM  Continue IV Meropenum  Inpatient consult to Infectious Disease  PICC line placed and plans for Ertapenem daily x 14 days  Discharge 1/13 after patient taught by [employer]  Follow up with  Dr. English         VTE Risk Mitigation (From admission, onward)         Ordered     IP VTE LOW RISK PATIENT  Once      01/10/20 2211     Place sequential compression device  Until discontinued      01/10/20 2211                      Yolanda Quinn NP  Department of Hospital Medicine   [facility]

## 2020-01-12 NOTE — ASSESSMENT & PLAN NOTE
Source UTI  Repeat blood cultures x 2 in AM  Continue IV Meropenum  Inpatient consult to Infectious Disease  PICC line placed and plans for Ertapenem daily x 14 days  Discharge 1/13 after patient taught by [facility]  Follow up with Dr. English

## 2020-01-12 NOTE — SUBJECTIVE & OBJECTIVE
Interval History: No complaints today. Pt agreeable to PICC line placement. Plans for discharge tomorrow.     Review of Systems   Constitutional: Positive for activity change, chills and fever. Negative for appetite change.   HENT: Negative for trouble swallowing.    Eyes: Negative for visual disturbance.   Respiratory: Negative for apnea, cough, choking, chest tightness, shortness of breath, wheezing and stridor.    Cardiovascular: Negative for chest pain, palpitations and leg swelling.   Gastrointestinal: Negative for abdominal pain, constipation, diarrhea, nausea and vomiting.   Genitourinary: Negative for flank pain.   Musculoskeletal: Negative for back pain, neck pain and neck stiffness.   Skin: Negative for color change.   Neurological: Positive for weakness. Negative for dizziness, tremors, seizures, syncope, facial asymmetry, speech difficulty, light-headedness, numbness and headaches.   Psychiatric/Behavioral: Negative for agitation and behavioral problems.     Objective:     Vital Signs (Most Recent):  Temp: 98.3 °F (36.8 °C) (01/12/20 1234)  Pulse: 78 (01/12/20 1235)  Resp: 18 (01/12/20 1234)  BP: 122/69 (01/12/20 1235)  SpO2: 99 % (01/12/20 1234) Vital Signs (24h Range):  Temp:  [97.8 °F (36.6 °C)-98.5 °F (36.9 °C)] 98.3 °F (36.8 °C)  Pulse:  [64-87] 78  Resp:  [16-18] 18  SpO2:  [96 %-100 %] 99 %  BP: (104-130)/(58-82) 122/69     Weight: 77 kg (169 lb 12.1 oz)  Body mass index is 27.4 kg/m².    Intake/Output Summary (Last 24 hours) at 1/12/2020 1547  Last data filed at 1/12/2020 0555  Gross per 24 hour   Intake 2965 ml   Output 2600 ml   Net 365 ml      Physical Exam   Constitutional: She is oriented to person, place, and time. She appears well-developed. No distress.   HENT:   Head: Normocephalic and atraumatic.   Eyes: EOM are normal.   Neck: Normal range of motion. Neck supple.   Cardiovascular: Normal rate, regular rhythm and intact distal pulses.   Pulmonary/Chest: Effort normal and breath sounds  normal. No stridor. No respiratory distress. She has no wheezes. She has no rales. She exhibits no tenderness.   Abdominal: Soft. Bowel sounds are normal. She exhibits no distension. There is no tenderness. There is no rebound and no guarding.   Genitourinary:   Genitourinary Comments: Deferred   Musculoskeletal: She exhibits no edema or tenderness.   Neurological: She is alert and oriented to person, place, and time. No sensory deficit.   Skin: Skin is warm and dry. Capillary refill takes less than 2 seconds.   Psychiatric: She has a normal mood and affect. Her behavior is normal. Thought content normal.   Nursing note and vitals reviewed.      Significant Labs:   CBC:   Recent Labs   Lab 01/10/20  2312 01/11/20  0734 01/12/20  0542   WBC 5.84 5.81 7.19   HGB 9.6* 10.1* 10.8*   HCT 30.4* 34.0* 35.7*   * 392* 374*     CMP:   Recent Labs   Lab 01/10/20  2312      K 3.9      CO2 25   *   BUN 9   CREATININE 0.9   CALCIUM 8.8   PROT 6.5   ALBUMIN 2.6*   BILITOT 0.2   ALKPHOS 64   AST 11   ALT 15   ANIONGAP 9   EGFRNONAA >60     All pertinent labs within the past 24 hours have been reviewed.    Significant Imaging: I have reviewed all pertinent imaging results/findings within the past 24 hours.

## 2020-01-12 NOTE — CONSULTS
[facility]  Infectious Disease  Consult Note    Patient Name: Addis Delvalle  MRN: [id number]  Admission Date: 1/10/2020  Hospital Length of Stay: 2 days  Attending Physician: Jeremias Prather MD  Primary Care Provider: Madie Horn MD     Isolation Status: Contact    Patient information was obtained from patient, past medical records and ER records.      Consults  Assessment/Plan:     * ESBL (extended spectrum beta-lactamase) producing bacteria infection  Will continue meropenem, repeat blood culture .     Pyelonephritis  Will plan to complete 2 weeks of meropenem   Repeat blood culture    Bacteremia  Isolate is MDR E coli-will continue meropenem         Thank you for your consult. I will follow-up with patient. Please contact us if you have any additional questions.    Lui English MD  Infectious Disease  [facility]    Subjective:     Principal Problem: ESBL (extended spectrum beta-lactamase) producing bacteria infection    HPI:   34 y.o. female  with a PMHx of spina bifida, neurogenic bladder with suprapubic catheter, and recurrent UTIs presented  to ED after being informed of abnormal blood culture, 01/05/20. She initially presented to free standing ER with these complaints 5 days ago and was  discharged with oral cipro. Blood cultures drawn during that ER visit came back positive today for MDR E.Coli.  01/05-    Amikacin MARIANNE 8 ug/ml: Susceptible   Ampicillin MARIANNE >=32 ug/ml: Resistant   Ampicillin + Sulbactam MARIANNE >=32 ug/ml: Resistant   Ceftriaxone MARIANNE >=64 ug/ml: Resistant   ESBL MARIANNE pos ug/ml: Pos   Gentamicin MARIANNE >=16 ug/ml: Resistant   Levofloxacin MARIANNE >=8 ug/ml: Resistant   Piperacillin + Tazobactam MARIANNE 8 ug/ml: Susceptible   Meropenem MARIANNE <=0.25 ug/ml: Susceptible   Tobramycin MARIANNE >=16 ug/ml: Resistant   Trimethoprim + Sulfamethoxazole MARIANNE <=20 ug/ml: Susceptible   Cefepime REID-DELGADILLO Resistant     CBC -1/5- wbc 25.7  CT scan of abdomen  -1/5-  1. Moderate to  moderate severe dilation of calyces right kidney, greatest in the mid and upper pole. Multiple calculi within the right kidney measuring up to 17 mm. Diffuse cortical thinning of kidney. Heterogeneous enhancement of kidney with some minimal stranding suggesting polynephritis. Right ureter proximally and distally is nondilated but is somewhat distended the midportion. No calculi within the ureter.    2. Distended somewhat folded thickened urinary bladder having the appearance of neurogenic bladder.    3. Left kidney and ureter are not identified.     4. Multiple prominent contrast enhancing retroperitoneal lymph nodes and a few mildly prominent enhancing mesenteric lymph nodes which may be reactive or inflammatory/infectious in etiology. Moderate stool throughout large bowel.  Since admission,she was started on meropenem .    Past Medical History:   Diagnosis Date    Spina bifida        History reviewed. No pertinent surgical history.    Review of patient's allergies indicates:  No Known Allergies    Medications:  No medications prior to admission.     Antibiotics (From admission, onward)    Start     Stop Route Frequency Ordered    01/11/20 0630  meropenem-0.9% sodium chloride 500 mg/50 mL IVPB      -- IV Every 6 hours (non-standard times) 01/10/20 2336        Antifungals (From admission, onward)    None        Antivirals (From admission, onward)    None             There is no immunization history on file for this patient.    Family History     None        Social History     Socioeconomic History    Marital status: Single     Spouse name: Not on file    Number of children: Not on file    Years of education: Not on file    Highest education level: Not on file   Occupational History    Not on file   Social Needs    Financial resource strain: Not on file    Food insecurity:     Worry: Not on file     Inability: Not on file    Transportation needs:     Medical: Not on file     Non-medical: Not on file    Tobacco Use    Smoking status: Never Smoker    Smokeless tobacco: Never Used   Substance and Sexual Activity    Alcohol use: Not on file    Drug use: Not on file    Sexual activity: Not on file   Lifestyle    Physical activity:     Days per week: Not on file     Minutes per session: Not on file    Stress: Not on file   Relationships    Social connections:     Talks on phone: Not on file     Gets together: Not on file     Attends Rastafarian service: Not on file     Active member of club or organization: Not on file     Attends meetings of clubs or organizations: Not on file     Relationship status: Not on file   Other Topics Concern    Not on file   Social History Narrative    Not on file     Review of Systems   Constitutional: Positive for activity change, chills and fever. Negative for appetite change.   HENT: Negative for trouble swallowing.    Eyes: Negative for visual disturbance.   Respiratory: Negative for apnea, cough, choking, chest tightness, shortness of breath, wheezing and stridor.    Cardiovascular: Negative for chest pain, palpitations and leg swelling.   Gastrointestinal: Negative for abdominal pain, constipation, diarrhea, nausea and vomiting.   Genitourinary: Positive for flank pain.   Musculoskeletal: Negative for back pain, neck pain and neck stiffness.   Skin: Negative for color change.   Neurological: Positive for weakness. Negative for dizziness, tremors, seizures, syncope, facial asymmetry, speech difficulty, light-headedness, numbness and headaches.   Psychiatric/Behavioral: Negative for agitation and behavioral problems.     Objective:     Vital Signs (Most Recent):  Temp: 97.8 °F (36.6 °C) (01/12/20 0423)  Pulse: 78 (01/12/20 0423)  Resp: 18 (01/12/20 0423)  BP: 104/62 (01/12/20 0423)  SpO2: 96 % (01/12/20 0423) Vital Signs (24h Range):  Temp:  [97.8 °F (36.6 °C)-98.5 °F (36.9 °C)] 97.8 °F (36.6 °C)  Pulse:  [64-86] 78  Resp:  [16-18] 18  SpO2:  [96 %-100 %] 96 %  BP:  (104-131)/(58-82) 104/62     Weight: 77 kg (169 lb 12.1 oz)  Body mass index is 27.4 kg/m².    Estimated Creatinine Clearance: 92.3 mL/min (based on SCr of 0.9 mg/dL).    Physical Exam   Constitutional: She is oriented to person, place, and time. She appears well-developed. No distress.   HENT:   Head: Normocephalic and atraumatic.   Eyes: EOM are normal.   Neck: Normal range of motion. Neck supple.   Cardiovascular: Normal rate, regular rhythm and intact distal pulses.   Pulmonary/Chest: Effort normal and breath sounds normal. No stridor. No respiratory distress. She has no wheezes. She has no rales. She exhibits no tenderness.   Abdominal: Soft. Bowel sounds are normal. She exhibits no distension. There is no tenderness. There is no rebound and no guarding.   Genitourinary:   Genitourinary Comments: Deferred   Musculoskeletal: She exhibits no edema or tenderness.   Neurological: She is alert and oriented to person, place, and time. No sensory deficit.   Skin: Skin is warm and dry. Capillary refill takes less than 2 seconds.   Psychiatric: She has a normal mood and affect. Her behavior is normal. Thought content normal.   Nursing note and vitals reviewed.      Significant Labs: Urine Culture: No results for input(s): LABURIN in the last 4320 hours.  All pertinent labs within the past 24 hours have been reviewed.    Significant Imaging: I have reviewed all pertinent imaging results/findings within the past 24 hours.

## 2020-01-12 NOTE — ASSESSMENT & PLAN NOTE
Will plan to complete 2 weeks of meropenem   Repeat blood culture    1/12- will switch to ertapenem   Will plan to complete 2 weeks of therapy -IM or IV.  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  IM option can be done .  She will need to come daily to get her IM shot .  PICC line can also be done and she gets once daily Ertapenem

## 2020-01-12 NOTE — PLAN OF CARE
Problem: Adult Inpatient Plan of Care  Goal: Plan of Care Review  Outcome: Ongoing, Progressing  Flowsheets (Taken 1/12/2020 0030)  Plan of Care Reviewed With: patient   Pt had no adverse events during shift. Pt free of falls. Call light in reach. Side rails x 2. Pt independent w/ self catheterization. Pt repositions independently. IVF/abx administered as ordered. Contact precautions in place. VSS. Chart reviewed, will continue to monitor.

## 2020-01-12 NOTE — PLAN OF CARE
Remains injury free. Denies c/o pain. Self caths, instructed to perform no sooner than every 4 hours, teach back. Compliant. Tolerating diet. Contact isolation.

## 2020-01-12 NOTE — ASSESSMENT & PLAN NOTE
Will continue meropenem, repeat blood culture .     1/12- will complete 2 weeks of Ertapenem -IM or IV option

## 2020-01-12 NOTE — PLAN OF CARE
Patient to be discharged on Ertapenem 1gm every 24 hours.  She is having a PICC line placed today and receiving her first dose also today.  Spoke with Latrice at [facility]. Information given Since patient has received her dose already would not come to hospital to teach.  Patient would have to go to [facility] to receive instructions tomorrow.  Discussed with Yolanda Quinn, [place] and patient.  Will hold discharge until tomorrow so patient can get instructions before discharge.  Referral sent via [employer].        01/12/20 2046   Post-Acute Status   Post-Acute Authorization Medications;Home Health/Hospice   Home Health/Hospice Status Referrals Sent

## 2020-01-13 VITALS
SYSTOLIC BLOOD PRESSURE: 130 MMHG | RESPIRATION RATE: 16 BRPM | WEIGHT: 169.75 LBS | TEMPERATURE: 98 F | OXYGEN SATURATION: 96 % | HEART RATE: 68 BPM | HEIGHT: 66 IN | DIASTOLIC BLOOD PRESSURE: 81 MMHG | BODY MASS INDEX: 27.28 KG/M2

## 2020-01-13 LAB
BASOPHILS # BLD AUTO: 0.03 K/UL (ref 0–0.2)
BASOPHILS NFR BLD: 0.6 % (ref 0–1.9)
DIFFERENTIAL METHOD: ABNORMAL
EOSINOPHIL # BLD AUTO: 0.1 K/UL (ref 0–0.5)
EOSINOPHIL NFR BLD: 2.1 % (ref 0–8)
ERYTHROCYTE [DISTWIDTH] IN BLOOD BY AUTOMATED COUNT: 13.4 % (ref 11.5–14.5)
HCT VFR BLD AUTO: 33.1 % (ref 37–48.5)
HGB BLD-MCNC: 10 G/DL (ref 12–16)
IMM GRANULOCYTES # BLD AUTO: 0.02 K/UL (ref 0–0.04)
IMM GRANULOCYTES NFR BLD AUTO: 0.4 % (ref 0–0.5)
LYMPHOCYTES # BLD AUTO: 2 K/UL (ref 1–4.8)
LYMPHOCYTES NFR BLD: 38.9 % (ref 18–48)
MCH RBC QN AUTO: 27.5 PG (ref 27–31)
MCHC RBC AUTO-ENTMCNC: 30.2 G/DL (ref 32–36)
MCV RBC AUTO: 91 FL (ref 82–98)
MONOCYTES # BLD AUTO: 0.7 K/UL (ref 0.3–1)
MONOCYTES NFR BLD: 13.1 % (ref 4–15)
NEUTROPHILS # BLD AUTO: 2.3 K/UL (ref 1.8–7.7)
NEUTROPHILS NFR BLD: 44.9 % (ref 38–73)
NRBC BLD-RTO: 0 /100 WBC
PLATELET # BLD AUTO: 363 K/UL (ref 150–350)
PMV BLD AUTO: 9.1 FL (ref 9.2–12.9)
RBC # BLD AUTO: 3.64 M/UL (ref 4–5.4)
WBC # BLD AUTO: 5.19 K/UL (ref 3.9–12.7)

## 2020-01-13 PROCEDURE — 85025 COMPLETE CBC W/AUTO DIFF WBC: CPT

## 2020-01-13 PROCEDURE — 25000003 PHARM REV CODE 250: Performed by: NURSE PRACTITIONER

## 2020-01-13 PROCEDURE — 63600175 PHARM REV CODE 636 W HCPCS: Performed by: INTERNAL MEDICINE

## 2020-01-13 RX ADMIN — ERTAPENEM 1 G: 1 INJECTION INTRAMUSCULAR; INTRAVENOUS at 09:01

## 2020-01-13 RX ADMIN — ACETAMINOPHEN 650 MG: 325 TABLET ORAL at 01:01

## 2020-01-13 NOTE — ASSESSMENT & PLAN NOTE
Will continue meropenem, repeat blood culture .     1/12- will complete 2 weeks of Ertapenem -IM or IV option     1/13- she now has a PICC line -will plan to complete 2 weeks of IV ertapenem

## 2020-01-13 NOTE — ASSESSMENT & PLAN NOTE
Will plan to complete 2 weeks of meropenem   Repeat blood culture    1/12- will switch to ertapenem   Will plan to complete 2 weeks of therapy -IM or IV.  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  She will need to come daily to get her IM shot .  PICC line can also be done and she gets once daily Ertapenem

## 2020-01-13 NOTE — PLAN OF CARE
Problem: Adult Inpatient Plan of Care  Goal: Plan of Care Review  1/13/2020 0037 by Elana Corea RN  Outcome: Ongoing, Progressing  Flowsheets (Taken 1/13/2020 0037)  Plan of Care Reviewed With: patient    Pt had no adverse events during shift. Pt free of falls. Call light in reach. Side rails x 2. Pt denies pain and nausea. Pt repositions independently. IVF administered as ordered. Pt independent w/ suprapubic catheter, self caths. VSS. Chart reviewed, will continue to monitor.

## 2020-01-13 NOTE — PLAN OF CARE
WALTER communicated with Duane at [email or web address].  Duane will do bedside teaching and make CM aware when teaching is complete.

## 2020-01-13 NOTE — PROGRESS NOTES
[facility]  Infectious Disease  Progress Note    Patient Name: Addis Delvalle  MRN: [id number]  Admission Date: 1/10/2020  Length of Stay: 3 days  Attending Physician: Jeremias Prather MD  Primary Care Provider: Madie Horn MD    Isolation Status: Contact  Assessment/Plan:      * ESBL (extended spectrum beta-lactamase) producing bacteria infection  Will continue meropenem, repeat blood culture .     1/12- will complete 2 weeks of Ertapenem -IM or IV option     1/13- she now has a PICC line -will plan to complete 2 weeks of IV ertapenem     Pyelonephritis  Will plan to complete 2 weeks of meropenem   Repeat blood culture    1/12- will switch to ertapenem   Will plan to complete 2 weeks of therapy -IM or IV.  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  She will need to come daily to get her IM shot .  PICC line can also be done and she gets once daily Ertapenem     UTI (urinary tract infection)  From ESBL E coli -will complete 2 weeks of Ertapenem as planned     Bacteremia  Isolate is MDR E coli-will continue meropenem     1/12- please see plan above        Anticipated Disposition:     Thank you for your consult. I will follow-up with patient. Please contact us if you have any additional questions.    Lui English MD  Infectious Disease  [facility]    Subjective:     Principal Problem:ESBL (extended spectrum beta-lactamase) producing bacteria infection    HPI:   34 y.o. female  with a PMHx of spina bifida, neurogenic bladder with suprapubic catheter, and recurrent UTIs presented  to ED after being informed of abnormal blood culture, 01/05/20. She initially presented to free standing ER with these complaints 5 days ago and was  discharged with oral cipro. Blood cultures drawn during that ER visit came back positive today for MDR E.Coli.  01/05-    Amikacin MARIANNE 8 ug/ml: Susceptible   Ampicillin MARIANNE >=32 ug/ml: Resistant   Ampicillin  + Sulbactam MARIANEN >=32 ug/ml: Resistant   Ceftriaxone MARIANNE >=64 ug/ml: Resistant   ESBL MARIANNE pos ug/ml: Pos   Gentamicin MARIANNE >=16 ug/ml: Resistant   Levofloxacin MARIANNE >=8 ug/ml: Resistant   Piperacillin + Tazobactam MARIANNE 8 ug/ml: Susceptible   Meropenem MARIANNE <=0.25 ug/ml: Susceptible   Tobramycin MARIANNE >=16 ug/ml: Resistant   Trimethoprim + Sulfamethoxazole MARIANNE <=20 ug/ml: Susceptible   Cefepime REID-DELGADILLO Resistant     CBC -1/5- wbc 25.7  CT scan of abdomen  -1/5-  1. Moderate to moderate severe dilation of calyces right kidney, greatest in the mid and upper pole. Multiple calculi within the right kidney measuring up to 17 mm. Diffuse cortical thinning of kidney. Heterogeneous enhancement of kidney with some minimal stranding suggesting polynephritis. Right ureter proximally and distally is nondilated but is somewhat distended the midportion. No calculi within the ureter.    2. Distended somewhat folded thickened urinary bladder having the appearance of neurogenic bladder.    3. Left kidney and ureter are not identified.     4. Multiple prominent contrast enhancing retroperitoneal lymph nodes and a few mildly prominent enhancing mesenteric lymph nodes which may be reactive or inflammatory/infectious in etiology. Moderate stool throughout large bowel.  Since admission,she was started on meropenem .  Interval History:  She remains afebrile .  PICC line has been inserted.    Review of Systems   Constitutional: Positive for activity change and chills. Negative for appetite change and fever.   HENT: Negative for trouble swallowing.    Eyes: Negative for visual disturbance.   Respiratory: Negative for apnea, cough, choking, chest tightness, shortness of breath, wheezing and stridor.    Cardiovascular: Negative for chest pain, palpitations and leg swelling.   Gastrointestinal: Negative for abdominal pain, constipation, diarrhea, nausea and vomiting.   Genitourinary: Negative for flank pain.   Musculoskeletal: Negative for back  pain, neck pain and neck stiffness.   Skin: Negative for color change.   Neurological: Positive for weakness. Negative for dizziness, tremors, seizures, syncope, facial asymmetry, speech difficulty, light-headedness, numbness and headaches.   Psychiatric/Behavioral: Negative for agitation and behavioral problems.     Objective:     Vital Signs (Most Recent):  Temp: 98.1 °F (36.7 °C) (01/13/20 0711)  Pulse: 68 (01/13/20 0711)  Resp: 16 (01/13/20 0711)  BP: 130/81 (01/13/20 0711)  SpO2: 96 % (01/13/20 0711) Vital Signs (24h Range):  Temp:  [97.5 °F (36.4 °C)-98.9 °F (37.2 °C)] 98.1 °F (36.7 °C)  Pulse:  [65-87] 68  Resp:  [16-18] 16  SpO2:  [96 %-99 %] 96 %  BP: (122-137)/(59-89) 130/81     Weight: 77 kg (169 lb 12.1 oz)  Body mass index is 27.4 kg/m².    Estimated Creatinine Clearance: 92.3 mL/min (based on SCr of 0.9 mg/dL).    Physical Exam   Constitutional: She is oriented to person, place, and time. She appears well-developed. No distress.   HENT:   Head: Normocephalic and atraumatic.   Eyes: EOM are normal.   Neck: Normal range of motion. Neck supple.   Cardiovascular: Normal rate, regular rhythm and intact distal pulses.   Pulmonary/Chest: Effort normal and breath sounds normal. No stridor. No respiratory distress. She has no wheezes. She has no rales. She exhibits no tenderness.   Abdominal: Soft. Bowel sounds are normal. She exhibits no distension. There is no tenderness. There is no rebound and no guarding.   Genitourinary:   Genitourinary Comments: Deferred   Musculoskeletal: She exhibits no edema or tenderness.   Neurological: She is alert and oriented to person, place, and time. No sensory deficit.   Skin: Skin is warm and dry. Capillary refill takes less than 2 seconds.   Psychiatric: She has a normal mood and affect. Her behavior is normal. Thought content normal.   Nursing note and vitals reviewed.       Significant Labs:   Blood Culture:   Recent Labs   Lab 01/11/20  0743   LABBLOO No Growth to date   No Growth to date  No Growth to date  No Growth to date     BMP: No results for input(s): GLU, NA, K, CL, CO2, BUN, CREATININE, CALCIUM, MG in the last 48 hours.  CBC:   Recent Labs   Lab 01/12/20  0542 01/13/20  0415   WBC 7.19 5.19   HGB 10.8* 10.0*   HCT 35.7* 33.1*   * 363*     All pertinent labs within the past 24 hours have been reviewed.    Significant Imaging: I have reviewed all pertinent imaging results/findings within the past 24 hours.

## 2020-01-13 NOTE — CONSULTS
Duane has completed bedside teaching.  IV medication will be delivered to patient's home this evening.  Duane provided patient with his contact information so that she can call and set up an appointment for PICC line care.

## 2020-01-13 NOTE — PLAN OF CARE
CM met with patient at the bedside to discuss the discharge plan.  CM updated patient that Duane with [employer] would come to bedside this am to complete teaching.  Patient has no other needs at this time.     01/13/20 1003   Discharge Reassessment   Assessment Type Discharge Planning Reassessment   Provided patient/caregiver education on the expected discharge date and the discharge plan Yes   Do you have any problems affording any of your prescribed medications? No   Discharge Plan A Home with family   DME Needed Upon Discharge  other (see comments)  (IV infusion )   Patient choice form signed by patient/caregiver N/A   Anticipated Discharge Disposition Home   Can the patient answer the patient profile reliably? Yes, cognitively intact

## 2020-01-13 NOTE — PLAN OF CARE
[facility] for IV infusions.     01/13/20 1401   Final Note   Assessment Type Final Discharge Note   Anticipated Discharge Disposition Home   Right Care Referral Info   Post Acute Recommendation No Care

## 2020-01-13 NOTE — SUBJECTIVE & OBJECTIVE
Interval History:  She remains afebrile .  PICC line has been inserted.    Review of Systems   Constitutional: Positive for activity change and chills. Negative for appetite change and fever.   HENT: Negative for trouble swallowing.    Eyes: Negative for visual disturbance.   Respiratory: Negative for apnea, cough, choking, chest tightness, shortness of breath, wheezing and stridor.    Cardiovascular: Negative for chest pain, palpitations and leg swelling.   Gastrointestinal: Negative for abdominal pain, constipation, diarrhea, nausea and vomiting.   Genitourinary: Negative for flank pain.   Musculoskeletal: Negative for back pain, neck pain and neck stiffness.   Skin: Negative for color change.   Neurological: Positive for weakness. Negative for dizziness, tremors, seizures, syncope, facial asymmetry, speech difficulty, light-headedness, numbness and headaches.   Psychiatric/Behavioral: Negative for agitation and behavioral problems.     Objective:     Vital Signs (Most Recent):  Temp: 98.1 °F (36.7 °C) (01/13/20 0711)  Pulse: 68 (01/13/20 0711)  Resp: 16 (01/13/20 0711)  BP: 130/81 (01/13/20 0711)  SpO2: 96 % (01/13/20 0711) Vital Signs (24h Range):  Temp:  [97.5 °F (36.4 °C)-98.9 °F (37.2 °C)] 98.1 °F (36.7 °C)  Pulse:  [65-87] 68  Resp:  [16-18] 16  SpO2:  [96 %-99 %] 96 %  BP: (122-137)/(59-89) 130/81     Weight: 77 kg (169 lb 12.1 oz)  Body mass index is 27.4 kg/m².    Estimated Creatinine Clearance: 92.3 mL/min (based on SCr of 0.9 mg/dL).    Physical Exam   Constitutional: She is oriented to person, place, and time. She appears well-developed. No distress.   HENT:   Head: Normocephalic and atraumatic.   Eyes: EOM are normal.   Neck: Normal range of motion. Neck supple.   Cardiovascular: Normal rate, regular rhythm and intact distal pulses.   Pulmonary/Chest: Effort normal and breath sounds normal. No stridor. No respiratory distress. She has no wheezes. She has no rales. She exhibits no tenderness.    Abdominal: Soft. Bowel sounds are normal. She exhibits no distension. There is no tenderness. There is no rebound and no guarding.   Genitourinary:   Genitourinary Comments: Deferred   Musculoskeletal: She exhibits no edema or tenderness.   Neurological: She is alert and oriented to person, place, and time. No sensory deficit.   Skin: Skin is warm and dry. Capillary refill takes less than 2 seconds.   Psychiatric: She has a normal mood and affect. Her behavior is normal. Thought content normal.   Nursing note and vitals reviewed.       Significant Labs:   Blood Culture:   Recent Labs   Lab 01/11/20  0743   LABBLOO No Growth to date  No Growth to date  No Growth to date  No Growth to date     BMP: No results for input(s): GLU, NA, K, CL, CO2, BUN, CREATININE, CALCIUM, MG in the last 48 hours.  CBC:   Recent Labs   Lab 01/12/20  0542 01/13/20  0415   WBC 7.19 5.19   HGB 10.8* 10.0*   HCT 35.7* 33.1*   * 363*     All pertinent labs within the past 24 hours have been reviewed.    Significant Imaging: I have reviewed all pertinent imaging results/findings within the past 24 hours.

## 2020-01-15 NOTE — DISCHARGE SUMMARY
[facility] Hospital Medicine  Discharge Summary      Patient Name: Addis Delvalle  MRN: [id number]  Admission Date: 1/10/2020  Hospital Length of Stay: 3 days  Discharge Date and Time: 1/13/2020 12:07 PM  Attending Physician: Jeremias Prather MD  Discharging Provider: Yolanda Quinn NP  Primary Care Provider: Madie Horn MD      HPI:   Addis Delvalle 34 y.o. female  with a PMHx of spina bifida, neurogenic bladder with suprapubic catheter, and recurrent UTIs presented  to ED after being informed of abnormal blood culture, 01/05/20. She initially presented to free standing ER with these complaints 5 days ago and was found to have UTI. Admission was recommended at that time but she declined and elected to have oral abx. She was discharged with oral cipro. Blood cultures drawn during that ER visit came back positive today for MDR E.Coli. ED staff called patient back to present to ED since culture was resistant to antibiotics she was discharged with. She was febrile 100.2 and tachycardic upon presentation. She was started on IV meropenem and given IVF's. Lactic acid was 1.6. UA was again positive for nitrates, Leuk est and bacteria. She is otherwise clinically and hemodynamically stable. Patient will be admitted for IV abx treatment for MDR E.coli UTI.     * No surgery found *      Hospital Course:   Receiving IV merrem for E coli bacteremia (MDR). Infectious Ds consult pending. Repeat blood cultures pending. Pt performs self cath every 3 to 4 hours.   1/12/2020 - repeat blood cultures - NGTD. Infectious Ds determined plan of IV Ertapenem daily x 2 weeks. PICC line placed.  assisting with ABX. [employer] cannot teach patient until tomorrow 1/13/2020 - will plan for discharge then. Pt to follow up with Dr. English.  1/13/2020 - [employer] was able to perform bedside teaching for Ertapenem administration. Pt was seen and examined and determined to be safe and stable for discharge.  Also, she was given a prescription for supply of in and out caths. Pt was advised to follow up with Dr. English and her PCP.      Consults:   Consults (From admission, onward)        Status Ordering Provider     Inpatient consult to Social Work  Once     Provider:  (Not yet assigned)    Completed BRONWYN GONZALEZ     Inpatient consult to Social Work  Once     Provider:  (Not yet assigned)    Completed SUE HILL          No new Assessment & Plan notes have been filed under this hospital service since the last note was generated.  Service: Hospital Medicine    Final Active Diagnoses:    Diagnosis Date Noted POA    PRINCIPAL PROBLEM:  ESBL (extended spectrum beta-lactamase) producing bacteria infection [A49.9, Z16.12] 01/10/2020 Yes    Pyelonephritis [N12] 01/12/2020 Unknown    UTI (urinary tract infection) [N39.0] 01/11/2020 Yes    Bacteremia [R78.81] 01/10/2020 Yes    Neurogenic bladder [N31.9] 01/10/2020 Yes      Problems Resolved During this Admission:       Discharged Condition: stable    Disposition: Home or Self Care    Follow Up:  Follow-up Information     [facility].    Specialties:  Pharmacist, DME Provider, IV Infusion  Why:  Home Infusion  Contact information:  [address]  [address] [place] [id number] [phone]                 Patient Instructions:      Notify your health care provider if you experience any of the following:  temperature >100.4     Activity as tolerated       Significant Diagnostic Studies: Labs:   CMP No results for input(s): NA, K, CL, CO2, GLU, BUN, CREATININE, CALCIUM, PROT, ALBUMIN, BILITOT, ALKPHOS, AST, ALT, ANIONGAP, ESTGFRAFRICA, EGFRNONAA in the last 48 hours. and CBC   Recent Labs   Lab 01/13/20  0415   WBC 5.19   HGB 10.0*   HCT 33.1*   *       Pending Diagnostic Studies:     None         Medications:  Reconciled Home Medications:      Medication List      START taking these medications    ERTAPENEM (INVANZ) 1 G/100 ML NS  (READY TO MIX)  Inject 100 mLs (1 g total) into the vein once daily. for 13 days            Indwelling Lines/Drains at time of discharge:   Lines/Drains/Airways     Peripherally Inserted Central Catheter Line                 PICC Double Lumen 01/12/20 1226 right basilic 2 days          Drain                 Suprapubic Catheter -- days                Time spent on the discharge of patient: > 30 minutes  Patient was seen and examined on the date of discharge and determined to be suitable for discharge.         Yolanda Quinn NP  Department of Hospital Medicine  [facility]

## 2020-01-16 LAB
BACTERIA BLD CULT: NORMAL
BACTERIA BLD CULT: NORMAL

## 2020-01-16 NOTE — PHYSICIAN QUERY
PT Name: Addis Delvalle  MR #: [id number]    Physician Query Form - Cause and Effect Relationship Clarification      CDS: Mary Lou Go RN, CCDS         Contact information :ext [phone])  [email or web address]       This form is a permanent document in the medical record.     Query Date: January 16, 2020    By submitting this query, we are merely seeking further clarification of documentation. Please utilize your independent clinical judgment when addressing the question(s) below.    The Medical record contains the following:  Supporting Clinical Findings   Location in record          Neurogenic bladder  Hx of spina bifida, neurogenic bladder with suprapubic catheter, and recurrent UTIs  Suprapubic catheter, self caths -- continue  Likely source of bacteremia   Bacteremia  Source UTI  Repeat blood cultures x 2 in AM  Continue IV Meropenum  Inpatient consult to Infectious Disease                                                                         UTI (urinary tract infection)                   Pyelonephritis            Suprapubic Catheter                                                                                     H&P 1/10/20                          Discharge summary 1/14/20                           Provider, please clarify if there is any correlation between UTI/Pyelonephritis  and Suprapubic Catheter.           Are the conditions:      [ X ] Due to or associated with each other   [   ] Unrelated to each other   [   ] Other (Please Specify): _________________________   [  ] Clinically Undetermined

## 2020-01-16 NOTE — PHYSICIAN QUERY
PT Name: Addis Delvalle  MR #: [id number]     PHYSICIAN QUERY -  ACUITY OF CONDITION CLARIFICATION      CDS: Mary Lou Go RN, CCDS         Contact information :ext [phone])  [email or web address]     This form is a permanent document in the medical record.     Query Date: January 16, 2020    By submitting this query, we are merely seeking further clarification of documentation to reflect the severity of illness of your patient. Please utilize your independent clinical judgment when addressing the question(s) below.    The Medical record reflects the following:     Indicators   Supporting Clinical Findings Location in Medical Record   x Documentation of condition Pyelonephritis     Pyelonephritis     ID consult 1/12/20    Discharge summary 1/14/20    Lab Value(s)      Radiology Findings     x Treatment                                 Medication Will plan to complete 2 weeks of meropenem ID consult 1/12/20   x Other ESBL (extended spectrum beta-lactamase) producing bacteria infection  START taking these medications    ERTAPENEM (INVANZ) 1 G/100 ML NS (READY TO MIX)  Inject 100 mLs (1 g total) into the vein once daily. for 13 days      Discharge summary 1/14/20     Provider, please specify the acuity/chronicity of Pyelonephritis :    [ X ] Acute   [   ] Chronic   [   ] Acute and/on chronic   [   ] Other clarification   [   ]  Clinically Undetermined       Please document in your progress notes daily for the duration of treatment until resolved, and include in your discharge summary.

## 2020-01-20 ENCOUNTER — LAB VISIT (OUTPATIENT)
Dept: LAB | Facility: HOSPITAL | Age: 35
End: 2020-01-20
Attending: INTERNAL MEDICINE
Payer: MEDICAID

## 2020-01-20 DIAGNOSIS — B96.20 ESCHERICHIA COLI URINARY TRACT INFECTION: Primary | ICD-10-CM

## 2020-01-20 DIAGNOSIS — N39.0 ESCHERICHIA COLI URINARY TRACT INFECTION: Primary | ICD-10-CM

## 2020-01-20 LAB — ERYTHROCYTE [SEDIMENTATION RATE] IN BLOOD BY WESTERGREN METHOD: 69 MM/HR (ref 0–36)

## 2020-01-20 PROCEDURE — 80048 BASIC METABOLIC PNL TOTAL CA: CPT

## 2020-01-20 PROCEDURE — 86140 C-REACTIVE PROTEIN: CPT

## 2020-01-20 PROCEDURE — 85025 COMPLETE CBC W/AUTO DIFF WBC: CPT

## 2020-01-20 PROCEDURE — 85652 RBC SED RATE AUTOMATED: CPT

## 2020-01-21 LAB
ANION GAP SERPL CALC-SCNC: 11 MMOL/L (ref 8–16)
BASOPHILS # BLD AUTO: 0.05 K/UL (ref 0–0.2)
BASOPHILS NFR BLD: 0.6 % (ref 0–1.9)
BUN SERPL-MCNC: 16 MG/DL (ref 6–20)
CALCIUM SERPL-MCNC: 9.3 MG/DL (ref 8.7–10.5)
CHLORIDE SERPL-SCNC: 108 MMOL/L (ref 95–110)
CO2 SERPL-SCNC: 23 MMOL/L (ref 23–29)
CREAT SERPL-MCNC: 0.9 MG/DL (ref 0.5–1.4)
CRP SERPL-MCNC: 12.1 MG/L (ref 0–8.2)
DIFFERENTIAL METHOD: ABNORMAL
EOSINOPHIL # BLD AUTO: 0.3 K/UL (ref 0–0.5)
EOSINOPHIL NFR BLD: 3.3 % (ref 0–8)
ERYTHROCYTE [DISTWIDTH] IN BLOOD BY AUTOMATED COUNT: 13.8 % (ref 11.5–14.5)
EST. GFR  (AFRICAN AMERICAN): >60 ML/MIN/1.73 M^2
EST. GFR  (NON AFRICAN AMERICAN): >60 ML/MIN/1.73 M^2
GLUCOSE SERPL-MCNC: 125 MG/DL (ref 70–110)
HCT VFR BLD AUTO: 35.6 % (ref 37–48.5)
HGB BLD-MCNC: 10.6 G/DL (ref 12–16)
IMM GRANULOCYTES # BLD AUTO: 0.02 K/UL (ref 0–0.04)
IMM GRANULOCYTES NFR BLD AUTO: 0.2 % (ref 0–0.5)
LYMPHOCYTES # BLD AUTO: 2.2 K/UL (ref 1–4.8)
LYMPHOCYTES NFR BLD: 26.2 % (ref 18–48)
MCH RBC QN AUTO: 27.5 PG (ref 27–31)
MCHC RBC AUTO-ENTMCNC: 29.8 G/DL (ref 32–36)
MCV RBC AUTO: 92 FL (ref 82–98)
MONOCYTES # BLD AUTO: 0.4 K/UL (ref 0.3–1)
MONOCYTES NFR BLD: 5 % (ref 4–15)
NEUTROPHILS # BLD AUTO: 5.5 K/UL (ref 1.8–7.7)
NEUTROPHILS NFR BLD: 64.7 % (ref 38–73)
NRBC BLD-RTO: 0 /100 WBC
PLATELET # BLD AUTO: 374 K/UL (ref 150–350)
PMV BLD AUTO: 10.8 FL (ref 9.2–12.9)
POTASSIUM SERPL-SCNC: 3.9 MMOL/L (ref 3.5–5.1)
RBC # BLD AUTO: 3.86 M/UL (ref 4–5.4)
SODIUM SERPL-SCNC: 142 MMOL/L (ref 136–145)
WBC # BLD AUTO: 8.43 K/UL (ref 3.9–12.7)

## 2020-01-28 ENCOUNTER — OFFICE VISIT (OUTPATIENT)
Dept: FAMILY MEDICINE | Facility: CLINIC | Age: 35
End: 2020-01-28
Payer: MEDICAID

## 2020-01-28 VITALS
TEMPERATURE: 98 F | OXYGEN SATURATION: 99 % | WEIGHT: 163.5 LBS | BODY MASS INDEX: 32.1 KG/M2 | HEART RATE: 82 BPM | HEIGHT: 60 IN | SYSTOLIC BLOOD PRESSURE: 124 MMHG | DIASTOLIC BLOOD PRESSURE: 68 MMHG

## 2020-01-28 DIAGNOSIS — D64.9 ANEMIA, UNSPECIFIED TYPE: ICD-10-CM

## 2020-01-28 DIAGNOSIS — Z16.12 ESBL (EXTENDED SPECTRUM BETA-LACTAMASE) PRODUCING BACTERIA INFECTION: ICD-10-CM

## 2020-01-28 DIAGNOSIS — A49.9 ESBL (EXTENDED SPECTRUM BETA-LACTAMASE) PRODUCING BACTERIA INFECTION: ICD-10-CM

## 2020-01-28 DIAGNOSIS — Z09 HOSPITAL DISCHARGE FOLLOW-UP: Primary | ICD-10-CM

## 2020-01-28 DIAGNOSIS — N31.9 NEUROGENIC BLADDER: ICD-10-CM

## 2020-01-28 PROCEDURE — 99495 TCM SERVICES (MODERATE COMPLEXITY): ICD-10-PCS | Mod: S$PBB,,, | Performed by: FAMILY MEDICINE

## 2020-01-28 PROCEDURE — 99213 OFFICE O/P EST LOW 20 MIN: CPT | Mod: PBBFAC,PO | Performed by: FAMILY MEDICINE

## 2020-01-28 PROCEDURE — 99999 PR PBB SHADOW E&M-EST. PATIENT-LVL III: CPT | Mod: PBBFAC,,, | Performed by: FAMILY MEDICINE

## 2020-01-28 PROCEDURE — 99495 TRANSJ CARE MGMT MOD F2F 14D: CPT | Mod: PBBFAC,PO | Performed by: FAMILY MEDICINE

## 2020-01-28 PROCEDURE — 99495 TRANSJ CARE MGMT MOD F2F 14D: CPT | Mod: S$PBB,,, | Performed by: FAMILY MEDICINE

## 2020-01-28 PROCEDURE — 99999 PR PBB SHADOW E&M-EST. PATIENT-LVL III: ICD-10-PCS | Mod: PBBFAC,,, | Performed by: FAMILY MEDICINE

## 2020-01-28 RX ORDER — DESOGESTREL AND ETHINYL ESTRADIOL 0.15-0.03
1 KIT ORAL DAILY
COMMUNITY
Start: 2020-01-06 | End: 2020-03-02 | Stop reason: SDUPTHER

## 2020-01-28 NOTE — PROGRESS NOTES
Transitional Care Note  Subjective:       Patient ID: Addis Delvalle is a 34 y.o. female.  Chief Complaint: Hospital Follow Up    Family and/or Caretaker present at visit?  Yes.  Diagnostic tests reviewed/disposition: I have reviewed all completed as well as pending diagnostic tests at the time of discharge.  Disease/illness education: y  Home health/community services discussion/referrals: Patient does not have home health established from hospital visit.  They do not need home health.  If needed, we will set up home health for the patient.   Establishment or re-establishment of referral orders for community resources: No other necessary community resources.   Discussion with other health care providers: No discussion with other health care providers necessary.   Patient is status post hospital discharge  She has spina bifid a, history of intracranial shunt placement, has a suprapubic bladder reconstructed had early childhood and she self catheterizes every 4 hr.  She was diagnosed having a UTI after long time and it was ESBL bacteria.  She was treated with IV antibiotics she received a PICC line and self injected antibiotics.  Now the PICC line is out and she has finished a course of antibiotics.  She was seen and evaluated by Infectious Disease doctor Lui livingston    Review of Systems   Constitutional: Negative for activity change, chills, fatigue, fever and unexpected weight change.   HENT: Negative for congestion, ear discharge, ear pain, hearing loss, postnasal drip and rhinorrhea.    Eyes: Negative for pain and visual disturbance.   Respiratory: Negative for cough, chest tightness and shortness of breath.    Cardiovascular: Negative for chest pain and palpitations.   Gastrointestinal: Negative for abdominal pain, diarrhea and vomiting.   Endocrine: Negative for heat intolerance.   Genitourinary: Negative for dysuria, flank pain, frequency and hematuria.   Musculoskeletal: Negative for back pain, gait  problem and neck pain.   Skin: Negative for color change and rash.   Neurological: Negative for dizziness, tremors, seizures, numbness and headaches.   Psychiatric/Behavioral: Negative for agitation, hallucinations, self-injury, sleep disturbance and suicidal ideas. The patient is not nervous/anxious.        Objective:      Physical Exam   Constitutional: She is oriented to person, place, and time. She appears well-developed.   HENT:   Right Ear: External ear normal.   Left Ear: External ear normal.   Mouth/Throat: Oropharynx is clear and moist.   Eyes: Pupils are equal, round, and reactive to light. Conjunctivae are normal.   Neck: Normal range of motion. Neck supple.   Cardiovascular: Normal rate, regular rhythm and normal heart sounds.   No murmur heard.  Pulmonary/Chest: Effort normal and breath sounds normal. No respiratory distress. She has no wheezes. She has no rales. She exhibits no tenderness.   Abdominal: Soft. She exhibits no distension and no mass. There is no tenderness. There is no guarding.   Musculoskeletal: She exhibits no edema or tenderness.   Lymphadenopathy:     She has no cervical adenopathy.   Neurological: She is alert and oriented to person, place, and time. She has normal reflexes.   Skin: Skin is warm and dry.   Psychiatric: She has a normal mood and affect. Her behavior is normal. Judgment and thought content normal.       Assessment:       1. Hospital discharge follow-up    2. ESBL (extended spectrum beta-lactamase) producing bacteria infection    3. Neurogenic bladder    4. Anemia, unspecified type        Plan:         patient finish the antibiotics and is doing fine.  She is well aware of the aseptic techniques while trying to self catheterize  Will establish her with our [facility]  Recheck CBC in 1 month to see if anemia is improved if not will need further workup  Follow-up as needed

## 2020-02-27 ENCOUNTER — PATIENT MESSAGE (OUTPATIENT)
Dept: FAMILY MEDICINE | Facility: CLINIC | Age: 35
End: 2020-02-27

## 2020-02-28 ENCOUNTER — LAB VISIT (OUTPATIENT)
Dept: LAB | Facility: HOSPITAL | Age: 35
End: 2020-02-28
Attending: FAMILY MEDICINE
Payer: MEDICAID

## 2020-02-28 DIAGNOSIS — D64.9 ANEMIA, UNSPECIFIED TYPE: ICD-10-CM

## 2020-02-28 LAB
BASOPHILS # BLD AUTO: 0.05 K/UL (ref 0–0.2)
BASOPHILS NFR BLD: 0.8 % (ref 0–1.9)
DIFFERENTIAL METHOD: ABNORMAL
EOSINOPHIL # BLD AUTO: 0.2 K/UL (ref 0–0.5)
EOSINOPHIL NFR BLD: 3.3 % (ref 0–8)
ERYTHROCYTE [DISTWIDTH] IN BLOOD BY AUTOMATED COUNT: 14 % (ref 11.5–14.5)
HCT VFR BLD AUTO: 38.3 % (ref 37–48.5)
HGB BLD-MCNC: 11.5 G/DL (ref 12–16)
IMM GRANULOCYTES # BLD AUTO: 0.01 K/UL (ref 0–0.04)
IMM GRANULOCYTES NFR BLD AUTO: 0.2 % (ref 0–0.5)
LYMPHOCYTES # BLD AUTO: 2.5 K/UL (ref 1–4.8)
LYMPHOCYTES NFR BLD: 39 % (ref 18–48)
MCH RBC QN AUTO: 27.7 PG (ref 27–31)
MCHC RBC AUTO-ENTMCNC: 30 G/DL (ref 32–36)
MCV RBC AUTO: 92 FL (ref 82–98)
MONOCYTES # BLD AUTO: 0.5 K/UL (ref 0.3–1)
MONOCYTES NFR BLD: 7.3 % (ref 4–15)
NEUTROPHILS # BLD AUTO: 3.2 K/UL (ref 1.8–7.7)
NEUTROPHILS NFR BLD: 49.4 % (ref 38–73)
NRBC BLD-RTO: 0 /100 WBC
PLATELET # BLD AUTO: 312 K/UL (ref 150–350)
PMV BLD AUTO: 11 FL (ref 9.2–12.9)
RBC # BLD AUTO: 4.15 M/UL (ref 4–5.4)
WBC # BLD AUTO: 6.46 K/UL (ref 3.9–12.7)

## 2020-02-28 PROCEDURE — 85025 COMPLETE CBC W/AUTO DIFF WBC: CPT

## 2020-02-28 PROCEDURE — 36415 COLL VENOUS BLD VENIPUNCTURE: CPT | Mod: PO

## 2020-03-01 ENCOUNTER — PATIENT MESSAGE (OUTPATIENT)
Dept: FAMILY MEDICINE | Facility: CLINIC | Age: 35
End: 2020-03-01

## 2020-03-02 DIAGNOSIS — D64.9 ANEMIA, UNSPECIFIED TYPE: Primary | ICD-10-CM

## 2020-03-02 RX ORDER — DESOGESTREL AND ETHINYL ESTRADIOL 0.15-0.03
1 KIT ORAL DAILY
Qty: 30 TABLET | Refills: 2 | Status: SHIPPED | OUTPATIENT
Start: 2020-03-02 | End: 2020-03-12 | Stop reason: SDUPTHER

## 2020-03-04 ENCOUNTER — TELEPHONE (OUTPATIENT)
Dept: FAMILY MEDICINE | Facility: CLINIC | Age: 35
End: 2020-03-04

## 2020-03-04 ENCOUNTER — PATIENT MESSAGE (OUTPATIENT)
Dept: FAMILY MEDICINE | Facility: CLINIC | Age: 35
End: 2020-03-04

## 2020-03-04 DIAGNOSIS — D64.9 ANEMIA, UNSPECIFIED TYPE: Primary | ICD-10-CM

## 2020-03-12 RX ORDER — DESOGESTREL AND ETHINYL ESTRADIOL 0.15-0.03
1 KIT ORAL DAILY
Qty: 30 TABLET | Refills: 2 | Status: SHIPPED | OUTPATIENT
Start: 2020-03-12 | End: 2020-03-31 | Stop reason: SDUPTHER

## 2020-03-29 ENCOUNTER — PATIENT MESSAGE (OUTPATIENT)
Dept: FAMILY MEDICINE | Facility: CLINIC | Age: 35
End: 2020-03-29

## 2020-03-30 ENCOUNTER — PATIENT MESSAGE (OUTPATIENT)
Dept: FAMILY MEDICINE | Facility: CLINIC | Age: 35
End: 2020-03-30

## 2020-03-31 ENCOUNTER — PATIENT MESSAGE (OUTPATIENT)
Dept: FAMILY MEDICINE | Facility: CLINIC | Age: 35
End: 2020-03-31

## 2020-03-31 ENCOUNTER — OFFICE VISIT (OUTPATIENT)
Dept: FAMILY MEDICINE | Facility: CLINIC | Age: 35
End: 2020-03-31
Payer: MEDICAID

## 2020-03-31 DIAGNOSIS — N39.0 URINARY TRACT INFECTION WITHOUT HEMATURIA, SITE UNSPECIFIED: ICD-10-CM

## 2020-03-31 DIAGNOSIS — L02.92 BOIL: Primary | ICD-10-CM

## 2020-03-31 PROCEDURE — 99213 PR OFFICE/OUTPT VISIT, EST, LEVL III, 20-29 MIN: ICD-10-PCS | Mod: 95,,, | Performed by: FAMILY MEDICINE

## 2020-03-31 PROCEDURE — 99213 OFFICE O/P EST LOW 20 MIN: CPT | Mod: 95,,, | Performed by: FAMILY MEDICINE

## 2020-03-31 RX ORDER — DOXYCYCLINE HYCLATE 100 MG
100 TABLET ORAL EVERY 12 HOURS
Qty: 20 TABLET | Refills: 0 | Status: SHIPPED | OUTPATIENT
Start: 2020-03-31 | End: 2020-04-10

## 2020-03-31 RX ORDER — DESOGESTREL AND ETHINYL ESTRADIOL 0.15-0.03
1 KIT ORAL DAILY
Qty: 30 TABLET | Refills: 2 | Status: SHIPPED | OUTPATIENT
Start: 2020-03-31 | End: 2020-04-13 | Stop reason: SDUPTHER

## 2020-03-31 NOTE — PROGRESS NOTES
Chief Complaint:  No chief complaint on file.      History of Present Illness:    This is a virtual visit  She says she has got a boil in the left axilla has been draining some pus  Also complains of some burning sensation in the urine for the last few days.    ROS:  Review of Systems   Constitutional: Negative for activity change, chills, fatigue, fever and unexpected weight change.   HENT: Negative for congestion, ear discharge, ear pain, hearing loss, postnasal drip and rhinorrhea.    Eyes: Negative for pain and visual disturbance.   Respiratory: Negative for cough, chest tightness and shortness of breath.    Cardiovascular: Negative for chest pain and palpitations.   Gastrointestinal: Negative for abdominal pain, diarrhea and vomiting.   Endocrine: Negative for heat intolerance.   Genitourinary: Positive for dysuria. Negative for flank pain, frequency and hematuria.   Musculoskeletal: Negative for back pain, gait problem and neck pain.   Skin: Negative for color change and rash.   Neurological: Negative for dizziness, tremors, seizures, numbness and headaches.   Psychiatric/Behavioral: Negative for agitation, hallucinations, self-injury, sleep disturbance and suicidal ideas. The patient is not nervous/anxious.        Past Medical History:   Diagnosis Date    Spina bifida     Spina bifida        Social History:  Social History     Socioeconomic History    Marital status: Single     Spouse name: Not on file    Number of children: Not on file    Years of education: Not on file    Highest education level: Not on file   Occupational History    Not on file   Social Needs    Financial resource strain: Not on file    Food insecurity:     Worry: Not on file     Inability: Not on file    Transportation needs:     Medical: Not on file     Non-medical: Not on file   Tobacco Use    Smoking status: Never Smoker    Smokeless tobacco: Never Used   Substance and Sexual Activity    Alcohol use: Yes     Comment:  Occaisonally    Drug use: Never    Sexual activity: Never   Lifestyle    Physical activity:     Days per week: Not on file     Minutes per session: Not on file    Stress: Not on file   Relationships    Social connections:     Talks on phone: Not on file     Gets together: Not on file     Attends Gnosticism service: Not on file     Active member of club or organization: Not on file     Attends meetings of clubs or organizations: Not on file     Relationship status: Not on file   Other Topics Concern    Not on file   Social History Narrative    Not on file       Family History:   family history includes Diabetes in her father and paternal grandmother; Hyperlipidemia in her father and mother; Hypertension in her mother.    Health Maintenance   Topic Date Due    Lipid Panel  1985    TETANUS VACCINE  09/18/2003    Pap Smear with HPV Cotest  09/18/2006       Physical Exam:     ]    There is no height or weight on file to calculate BMI.    Physical Exam      Assessment:      ICD-10-CM ICD-9-CM   1. Boil L02.92 680.9   2. Urinary tract infection without hematuria, site unspecified N39.0 599.0         Plan:         Treat with doxycycline  Please use warm rags and if the abscess requires drainage come to the office for I&D.  If UTI does not resolve will do urine culture  The patient location is: Home   The chief complaint leading to consultation is: as below  Visit type: Virtual visit with synchronous audio and video  Total time spent with patient: 20+ mins  Each patient to whom he or she provides medical services by telemedicine is:  (1) informed of the relationship between the physician and patient and the respective role of any other health care provider with respect to management of the patient; and (2) notified that he or she may decline to receive medical services by telemedicine and may withdraw from such care at any time.    Notes: see below        No orders of the defined types were placed in this  encounter.      Current Outpatient Medications   Medication Sig Dispense Refill    desogestreL-ethinyl estradioL (APRI) 0.15-0.03 mg per tablet Take 1 tablet by mouth once daily. 30 tablet 2    doxycycline (VIBRA-TABS) 100 MG tablet Take 1 tablet (100 mg total) by mouth every 12 (twelve) hours. for 10 days 20 tablet 0    UNABLE TO FIND Urinary Catheter:  12 Slovenian long tip male straight cath, 1 each for in and out caths 4 times daily  Dispense # 120 1 each 0     No current facility-administered medications for this visit.        There are no discontinued medications.    No follow-ups on file.      Madie Horn MD

## 2020-04-13 RX ORDER — DESOGESTREL AND ETHINYL ESTRADIOL 0.15-0.03
1 KIT ORAL DAILY
Qty: 30 TABLET | Refills: 2 | Status: SHIPPED | OUTPATIENT
Start: 2020-04-13 | End: 2020-10-22

## 2020-08-06 ENCOUNTER — PATIENT MESSAGE (OUTPATIENT)
Dept: FAMILY MEDICINE | Facility: CLINIC | Age: 35
End: 2020-08-06

## 2020-08-06 RX ORDER — TIZANIDINE 4 MG/1
4 TABLET ORAL 2 TIMES DAILY
Qty: 10 TABLET | Refills: 0 | Status: SHIPPED | OUTPATIENT
Start: 2020-08-06 | End: 2020-08-11

## 2020-11-17 ENCOUNTER — OFFICE VISIT (OUTPATIENT)
Dept: FAMILY MEDICINE | Facility: CLINIC | Age: 35
End: 2020-11-17
Payer: MEDICAID

## 2020-11-17 VITALS
DIASTOLIC BLOOD PRESSURE: 82 MMHG | SYSTOLIC BLOOD PRESSURE: 128 MMHG | HEART RATE: 105 BPM | OXYGEN SATURATION: 95 % | HEIGHT: 60 IN | TEMPERATURE: 99 F | WEIGHT: 186.19 LBS | BODY MASS INDEX: 36.55 KG/M2

## 2020-11-17 DIAGNOSIS — N30.01 ACUTE CYSTITIS WITH HEMATURIA: Primary | ICD-10-CM

## 2020-11-17 LAB
BACTERIA #/AREA URNS AUTO: ABNORMAL /HPF
BILIRUB UR QL STRIP: NEGATIVE
CLARITY UR REFRACT.AUTO: ABNORMAL
COLOR UR AUTO: ABNORMAL
GLUCOSE UR QL STRIP: NEGATIVE
HGB UR QL STRIP: ABNORMAL
HYALINE CASTS UR QL AUTO: 0 /LPF
KETONES UR QL STRIP: NEGATIVE
LEUKOCYTE ESTERASE UR QL STRIP: ABNORMAL
MICROSCOPIC COMMENT: ABNORMAL
NITRITE UR QL STRIP: POSITIVE
PH UR STRIP: 6 [PH] (ref 5–8)
PROT UR QL STRIP: ABNORMAL
RBC #/AREA URNS AUTO: 6 /HPF (ref 0–4)
SP GR UR STRIP: 1.01 (ref 1–1.03)
SQUAMOUS #/AREA URNS AUTO: 0 /HPF
URN SPEC COLLECT METH UR: ABNORMAL
WBC #/AREA URNS AUTO: 94 /HPF (ref 0–5)

## 2020-11-17 PROCEDURE — 87086 URINE CULTURE/COLONY COUNT: CPT

## 2020-11-17 PROCEDURE — 99999 PR PBB SHADOW E&M-EST. PATIENT-LVL III: CPT | Mod: PBBFAC,,, | Performed by: FAMILY MEDICINE

## 2020-11-17 PROCEDURE — 99213 OFFICE O/P EST LOW 20 MIN: CPT | Mod: PBBFAC,PO | Performed by: FAMILY MEDICINE

## 2020-11-17 PROCEDURE — 99213 OFFICE O/P EST LOW 20 MIN: CPT | Mod: S$PBB,,, | Performed by: FAMILY MEDICINE

## 2020-11-17 PROCEDURE — 99213 PR OFFICE/OUTPT VISIT, EST, LEVL III, 20-29 MIN: ICD-10-PCS | Mod: S$PBB,,, | Performed by: FAMILY MEDICINE

## 2020-11-17 PROCEDURE — 99999 PR PBB SHADOW E&M-EST. PATIENT-LVL III: ICD-10-PCS | Mod: PBBFAC,,, | Performed by: FAMILY MEDICINE

## 2020-11-17 PROCEDURE — 81001 URINALYSIS AUTO W/SCOPE: CPT

## 2020-11-17 RX ORDER — SULFAMETHOXAZOLE AND TRIMETHOPRIM 800; 160 MG/1; MG/1
1 TABLET ORAL 2 TIMES DAILY
Qty: 14 TABLET | Refills: 0 | Status: SHIPPED | OUTPATIENT
Start: 2020-11-17 | End: 2021-01-21 | Stop reason: ALTCHOICE

## 2020-11-17 NOTE — PROGRESS NOTES
Chief Complaint:    Chief Complaint   Patient presents with    Urinary Tract Infection       History of Present Illness:    Complaining burning urine for the last or 2 no fever    ROS:  Review of Systems   Constitutional: Negative for activity change, chills, fatigue, fever and unexpected weight change.   HENT: Negative for congestion, ear discharge, ear pain, hearing loss, postnasal drip and rhinorrhea.    Eyes: Negative for pain and visual disturbance.   Respiratory: Negative for cough, chest tightness and shortness of breath.    Cardiovascular: Negative for chest pain and palpitations.   Gastrointestinal: Negative for abdominal pain, diarrhea and vomiting.   Endocrine: Negative for heat intolerance.   Genitourinary: Positive for dysuria. Negative for flank pain, frequency and hematuria.   Musculoskeletal: Negative for back pain, gait problem and neck pain.   Skin: Negative for color change and rash.   Neurological: Negative for dizziness, tremors, seizures, numbness and headaches.   Psychiatric/Behavioral: Negative for agitation, hallucinations, self-injury, sleep disturbance and suicidal ideas. The patient is not nervous/anxious.        Past Medical History:   Diagnosis Date    Spina bifida     Spina bifida        Social History:  Social History     Socioeconomic History    Marital status: Single     Spouse name: Not on file    Number of children: Not on file    Years of education: Not on file    Highest education level: Not on file   Occupational History    Not on file   Social Needs    Financial resource strain: Not on file    Food insecurity     Worry: Not on file     Inability: Not on file    Transportation needs     Medical: Not on file     Non-medical: Not on file   Tobacco Use    Smoking status: Never Smoker    Smokeless tobacco: Never Used   Substance and Sexual Activity    Alcohol use: Yes     Comment: Occaisonally    Drug use: Never    Sexual activity: Never   Lifestyle    Physical  "activity     Days per week: Not on file     Minutes per session: Not on file    Stress: Not on file   Relationships    Social connections     Talks on phone: Not on file     Gets together: Not on file     Attends Christianity service: Not on file     Active member of club or organization: Not on file     Attends meetings of clubs or organizations: Not on file     Relationship status: Not on file   Other Topics Concern    Not on file   Social History Narrative    Not on file       Family History:   family history includes Diabetes in her father and paternal grandmother; Hyperlipidemia in her father and mother; Hypertension in her mother.    Health Maintenance   Topic Date Due    Hepatitis C Screening  1985    Lipid Panel  1985    TETANUS VACCINE  09/18/2003       Physical Exam:    Vital Signs  Temp: 98.6 °F (37 °C)  Temp src: Temporal  Pulse: 105  SpO2: 95 %  BP: 128/82  Height and Weight  Height: 4' 11.5" (151.1 cm)  Weight: 84.5 kg (186 lb 2.9 oz)  BSA (Calculated - sq m): 1.88 sq meters  BMI (Calculated): 37  Weight in (lb) to have BMI = 25: 125.6]    Body mass index is 36.97 kg/m².    Physical Exam  Constitutional:       Appearance: She is well-developed.   Eyes:      Conjunctiva/sclera: Conjunctivae normal.      Pupils: Pupils are equal, round, and reactive to light.   Neck:      Musculoskeletal: Normal range of motion and neck supple.   Cardiovascular:      Rate and Rhythm: Normal rate and regular rhythm.      Heart sounds: Normal heart sounds. No murmur.   Pulmonary:      Effort: Pulmonary effort is normal. No respiratory distress.      Breath sounds: Normal breath sounds. No wheezing or rales.   Chest:      Chest wall: No tenderness.   Abdominal:      General: There is no distension.      Palpations: Abdomen is soft. There is no mass.      Tenderness: There is no abdominal tenderness. There is no guarding.   Musculoskeletal:         General: No tenderness.   Lymphadenopathy:      Cervical: No " cervical adenopathy.   Skin:     General: Skin is warm and dry.   Neurological:      Mental Status: She is alert and oriented to person, place, and time.      Deep Tendon Reflexes: Reflexes are normal and symmetric.   Psychiatric:         Behavior: Behavior normal.         Thought Content: Thought content normal.         Judgment: Judgment normal.           Assessment:      ICD-10-CM ICD-9-CM   1. Acute cystitis with hematuria  N30.01 595.0         Plan:         Treat Bactrim check urinalysis and culture      Orders Placed This Encounter   Procedures    Urinalysis, Reflex to Urine Culture Urine, Clean Catch       Current Outpatient Medications   Medication Sig Dispense Refill    ISIBLOOM 0.15-0.03 mg per tablet TAKE 1 TABLET DAILY FOR BIRTH CONTROL 28 tablet 0    UNABLE TO FIND Urinary Catheter:  12 Nepalese long tip male straight cath, 1 each for in and out caths 4 times daily  Dispense # 120 1 each 0    sulfamethoxazole-trimethoprim 800-160mg (BACTRIM DS) 800-160 mg Tab Take 1 tablet by mouth 2 (two) times daily. 14 tablet 0     No current facility-administered medications for this visit.        There are no discontinued medications.    No follow-ups on file.      Madie Horn MD

## 2020-11-18 ENCOUNTER — PATIENT MESSAGE (OUTPATIENT)
Dept: FAMILY MEDICINE | Facility: CLINIC | Age: 35
End: 2020-11-18

## 2020-11-19 LAB
BACTERIA UR CULT: NORMAL
BACTERIA UR CULT: NORMAL

## 2021-01-02 ENCOUNTER — PATIENT MESSAGE (OUTPATIENT)
Dept: FAMILY MEDICINE | Facility: CLINIC | Age: 36
End: 2021-01-02

## 2021-01-04 ENCOUNTER — PATIENT MESSAGE (OUTPATIENT)
Dept: FAMILY MEDICINE | Facility: CLINIC | Age: 36
End: 2021-01-04

## 2021-01-04 DIAGNOSIS — Z00.00 WELL ADULT EXAM: Primary | ICD-10-CM

## 2021-01-08 ENCOUNTER — LAB VISIT (OUTPATIENT)
Dept: LAB | Facility: HOSPITAL | Age: 36
End: 2021-01-08
Attending: FAMILY MEDICINE
Payer: MEDICAID

## 2021-01-08 DIAGNOSIS — Z00.00 WELL ADULT EXAM: ICD-10-CM

## 2021-01-08 LAB
ALBUMIN SERPL BCP-MCNC: 3.2 G/DL (ref 3.5–5.2)
ALP SERPL-CCNC: 92 U/L (ref 55–135)
ALT SERPL W/O P-5'-P-CCNC: 17 U/L (ref 10–44)
ANION GAP SERPL CALC-SCNC: 8 MMOL/L (ref 8–16)
AST SERPL-CCNC: 15 U/L (ref 10–40)
BASOPHILS # BLD AUTO: 0.05 K/UL (ref 0–0.2)
BASOPHILS NFR BLD: 0.6 % (ref 0–1.9)
BILIRUB SERPL-MCNC: 0.4 MG/DL (ref 0.1–1)
BUN SERPL-MCNC: 12 MG/DL (ref 6–20)
CALCIUM SERPL-MCNC: 8.2 MG/DL (ref 8.7–10.5)
CHLORIDE SERPL-SCNC: 107 MMOL/L (ref 95–110)
CHOLEST SERPL-MCNC: 182 MG/DL (ref 120–199)
CHOLEST/HDLC SERPL: 3 {RATIO} (ref 2–5)
CO2 SERPL-SCNC: 23 MMOL/L (ref 23–29)
CREAT SERPL-MCNC: 0.9 MG/DL (ref 0.5–1.4)
DIFFERENTIAL METHOD: ABNORMAL
EOSINOPHIL # BLD AUTO: 0.2 K/UL (ref 0–0.5)
EOSINOPHIL NFR BLD: 2.2 % (ref 0–8)
ERYTHROCYTE [DISTWIDTH] IN BLOOD BY AUTOMATED COUNT: 13.7 % (ref 11.5–14.5)
EST. GFR  (AFRICAN AMERICAN): >60 ML/MIN/1.73 M^2
EST. GFR  (NON AFRICAN AMERICAN): >60 ML/MIN/1.73 M^2
GLUCOSE SERPL-MCNC: 133 MG/DL (ref 70–110)
HCT VFR BLD AUTO: 37.5 % (ref 37–48.5)
HDLC SERPL-MCNC: 61 MG/DL (ref 40–75)
HDLC SERPL: 33.5 % (ref 20–50)
HGB BLD-MCNC: 11.1 G/DL (ref 12–16)
IMM GRANULOCYTES # BLD AUTO: 0.02 K/UL (ref 0–0.04)
IMM GRANULOCYTES NFR BLD AUTO: 0.2 % (ref 0–0.5)
LDLC SERPL CALC-MCNC: 91.2 MG/DL (ref 63–159)
LYMPHOCYTES # BLD AUTO: 2.8 K/UL (ref 1–4.8)
LYMPHOCYTES NFR BLD: 33.8 % (ref 18–48)
MCH RBC QN AUTO: 27.1 PG (ref 27–31)
MCHC RBC AUTO-ENTMCNC: 29.6 G/DL (ref 32–36)
MCV RBC AUTO: 92 FL (ref 82–98)
MONOCYTES # BLD AUTO: 0.7 K/UL (ref 0.3–1)
MONOCYTES NFR BLD: 8.2 % (ref 4–15)
NEUTROPHILS # BLD AUTO: 4.6 K/UL (ref 1.8–7.7)
NEUTROPHILS NFR BLD: 55 % (ref 38–73)
NONHDLC SERPL-MCNC: 121 MG/DL
NRBC BLD-RTO: 0 /100 WBC
PLATELET # BLD AUTO: 326 K/UL (ref 150–350)
PMV BLD AUTO: 10.2 FL (ref 9.2–12.9)
POTASSIUM SERPL-SCNC: 4.3 MMOL/L (ref 3.5–5.1)
PROT SERPL-MCNC: 7 G/DL (ref 6–8.4)
RBC # BLD AUTO: 4.09 M/UL (ref 4–5.4)
SODIUM SERPL-SCNC: 138 MMOL/L (ref 136–145)
TRIGL SERPL-MCNC: 149 MG/DL (ref 30–150)
WBC # BLD AUTO: 8.28 K/UL (ref 3.9–12.7)

## 2021-01-08 PROCEDURE — 85025 COMPLETE CBC W/AUTO DIFF WBC: CPT

## 2021-01-08 PROCEDURE — 80053 COMPREHEN METABOLIC PANEL: CPT

## 2021-01-08 PROCEDURE — 36415 COLL VENOUS BLD VENIPUNCTURE: CPT | Mod: PO

## 2021-01-08 PROCEDURE — 80061 LIPID PANEL: CPT

## 2021-01-19 RX ORDER — SULFAMETHOXAZOLE AND TRIMETHOPRIM 800; 160 MG/1; MG/1
1 TABLET ORAL 2 TIMES DAILY
Qty: 14 TABLET | Refills: 0 | Status: CANCELLED | OUTPATIENT
Start: 2021-01-19

## 2021-01-21 ENCOUNTER — OFFICE VISIT (OUTPATIENT)
Dept: FAMILY MEDICINE | Facility: CLINIC | Age: 36
End: 2021-01-21
Payer: MEDICAID

## 2021-01-21 ENCOUNTER — TELEPHONE (OUTPATIENT)
Dept: UROLOGY | Facility: CLINIC | Age: 36
End: 2021-01-21

## 2021-01-21 VITALS
TEMPERATURE: 98 F | SYSTOLIC BLOOD PRESSURE: 128 MMHG | BODY MASS INDEX: 35.47 KG/M2 | OXYGEN SATURATION: 99 % | DIASTOLIC BLOOD PRESSURE: 64 MMHG | WEIGHT: 180.69 LBS | HEART RATE: 101 BPM | HEIGHT: 60 IN

## 2021-01-21 DIAGNOSIS — R77.0 ABNORMAL ALBUMIN: ICD-10-CM

## 2021-01-21 DIAGNOSIS — N31.9 NEUROGENIC BLADDER: ICD-10-CM

## 2021-01-21 DIAGNOSIS — R82.90 FOUL SMELLING URINE: ICD-10-CM

## 2021-01-21 DIAGNOSIS — D64.9 MILD CHRONIC ANEMIA: Primary | ICD-10-CM

## 2021-01-21 DIAGNOSIS — L21.9 SEBORRHEIC DERMATITIS: ICD-10-CM

## 2021-01-21 LAB
BACTERIA #/AREA URNS AUTO: ABNORMAL /HPF
BILIRUB UR QL STRIP: NEGATIVE
CLARITY UR REFRACT.AUTO: ABNORMAL
COLOR UR AUTO: YELLOW
GLUCOSE UR QL STRIP: NEGATIVE
HGB UR QL STRIP: ABNORMAL
HYALINE CASTS UR QL AUTO: 0 /LPF
KETONES UR QL STRIP: NEGATIVE
LEUKOCYTE ESTERASE UR QL STRIP: ABNORMAL
MICROSCOPIC COMMENT: ABNORMAL
NITRITE UR QL STRIP: POSITIVE
PH UR STRIP: 6 [PH] (ref 5–8)
PROT UR QL STRIP: ABNORMAL
RBC #/AREA URNS AUTO: 3 /HPF (ref 0–4)
SP GR UR STRIP: 1.01 (ref 1–1.03)
SQUAMOUS #/AREA URNS AUTO: 1 /HPF
URN SPEC COLLECT METH UR: ABNORMAL
WBC #/AREA URNS AUTO: 82 /HPF (ref 0–5)

## 2021-01-21 PROCEDURE — 99214 PR OFFICE/OUTPT VISIT, EST, LEVL IV, 30-39 MIN: ICD-10-PCS | Mod: S$PBB,,, | Performed by: FAMILY MEDICINE

## 2021-01-21 PROCEDURE — 99214 OFFICE O/P EST MOD 30 MIN: CPT | Mod: PBBFAC,PO | Performed by: FAMILY MEDICINE

## 2021-01-21 PROCEDURE — 99999 PR PBB SHADOW E&M-EST. PATIENT-LVL IV: CPT | Mod: PBBFAC,,, | Performed by: FAMILY MEDICINE

## 2021-01-21 PROCEDURE — 99214 OFFICE O/P EST MOD 30 MIN: CPT | Mod: S$PBB,,, | Performed by: FAMILY MEDICINE

## 2021-01-21 PROCEDURE — 81001 URINALYSIS AUTO W/SCOPE: CPT

## 2021-01-21 PROCEDURE — 87086 URINE CULTURE/COLONY COUNT: CPT

## 2021-01-21 PROCEDURE — 99999 PR PBB SHADOW E&M-EST. PATIENT-LVL IV: ICD-10-PCS | Mod: PBBFAC,,, | Performed by: FAMILY MEDICINE

## 2021-01-21 RX ORDER — KETOCONAZOLE 20 MG/G
CREAM TOPICAL DAILY
Qty: 1 TUBE | Refills: 1 | Status: SHIPPED | OUTPATIENT
Start: 2021-01-21 | End: 2021-10-19 | Stop reason: SDUPTHER

## 2021-01-22 ENCOUNTER — PATIENT MESSAGE (OUTPATIENT)
Dept: HEMATOLOGY/ONCOLOGY | Facility: CLINIC | Age: 36
End: 2021-01-22

## 2021-01-22 ENCOUNTER — TELEPHONE (OUTPATIENT)
Dept: HEMATOLOGY/ONCOLOGY | Facility: CLINIC | Age: 36
End: 2021-01-22

## 2021-01-23 LAB
BACTERIA UR CULT: NORMAL
BACTERIA UR CULT: NORMAL

## 2021-01-25 ENCOUNTER — PATIENT MESSAGE (OUTPATIENT)
Dept: FAMILY MEDICINE | Facility: CLINIC | Age: 36
End: 2021-01-25

## 2021-01-26 ENCOUNTER — PATIENT MESSAGE (OUTPATIENT)
Dept: FAMILY MEDICINE | Facility: CLINIC | Age: 36
End: 2021-01-26

## 2021-01-26 RX ORDER — SULFAMETHOXAZOLE AND TRIMETHOPRIM 800; 160 MG/1; MG/1
1 TABLET ORAL 2 TIMES DAILY
Qty: 14 TABLET | Refills: 0 | Status: SHIPPED | OUTPATIENT
Start: 2021-01-26 | End: 2021-04-15

## 2021-02-26 ENCOUNTER — PATIENT MESSAGE (OUTPATIENT)
Dept: FAMILY MEDICINE | Facility: CLINIC | Age: 36
End: 2021-02-26

## 2021-03-15 RX ORDER — DESOGESTREL AND ETHINYL ESTRADIOL 0.15-0.03
1 KIT ORAL DAILY
Qty: 28 TABLET | Refills: 2 | Status: SHIPPED | OUTPATIENT
Start: 2021-03-15 | End: 2021-04-04 | Stop reason: SDUPTHER

## 2021-04-15 ENCOUNTER — OFFICE VISIT (OUTPATIENT)
Dept: UROLOGY | Facility: CLINIC | Age: 36
End: 2021-04-15
Payer: MEDICAID

## 2021-04-15 ENCOUNTER — PATIENT MESSAGE (OUTPATIENT)
Dept: UROLOGY | Facility: CLINIC | Age: 36
End: 2021-04-15

## 2021-04-15 VITALS
DIASTOLIC BLOOD PRESSURE: 80 MMHG | BODY MASS INDEX: 36.08 KG/M2 | WEIGHT: 181.69 LBS | SYSTOLIC BLOOD PRESSURE: 130 MMHG

## 2021-04-15 DIAGNOSIS — N31.9 NEUROGENIC BLADDER: ICD-10-CM

## 2021-04-15 DIAGNOSIS — N39.0 RECURRENT UTI: Primary | ICD-10-CM

## 2021-04-15 PROCEDURE — 87186 SC STD MICRODIL/AGAR DIL: CPT | Performed by: UROLOGY

## 2021-04-15 PROCEDURE — 87086 URINE CULTURE/COLONY COUNT: CPT | Performed by: UROLOGY

## 2021-04-15 PROCEDURE — 99999 PR PBB SHADOW E&M-EST. PATIENT-LVL III: CPT | Mod: PBBFAC,,, | Performed by: UROLOGY

## 2021-04-15 PROCEDURE — 99204 OFFICE O/P NEW MOD 45 MIN: CPT | Mod: S$PBB,,, | Performed by: UROLOGY

## 2021-04-15 PROCEDURE — 99999 PR PBB SHADOW E&M-EST. PATIENT-LVL III: ICD-10-PCS | Mod: PBBFAC,,, | Performed by: UROLOGY

## 2021-04-15 PROCEDURE — 99204 PR OFFICE/OUTPT VISIT, NEW, LEVL IV, 45-59 MIN: ICD-10-PCS | Mod: S$PBB,,, | Performed by: UROLOGY

## 2021-04-15 PROCEDURE — 87088 URINE BACTERIA CULTURE: CPT | Performed by: UROLOGY

## 2021-04-15 PROCEDURE — 87077 CULTURE AEROBIC IDENTIFY: CPT | Performed by: UROLOGY

## 2021-04-15 PROCEDURE — 99213 OFFICE O/P EST LOW 20 MIN: CPT | Mod: PBBFAC,PO | Performed by: UROLOGY

## 2021-04-15 RX ORDER — NITROFURANTOIN 25; 75 MG/1; MG/1
CAPSULE ORAL
Qty: 30 CAPSULE | Refills: 2 | Status: SHIPPED | OUTPATIENT
Start: 2021-04-15 | End: 2021-04-20

## 2021-04-19 ENCOUNTER — TELEPHONE (OUTPATIENT)
Dept: UROLOGY | Facility: CLINIC | Age: 36
End: 2021-04-19

## 2021-04-20 ENCOUNTER — TELEPHONE (OUTPATIENT)
Dept: UROLOGY | Facility: CLINIC | Age: 36
End: 2021-04-20

## 2021-04-20 LAB — BACTERIA UR CULT: ABNORMAL

## 2021-04-20 RX ORDER — GRANULES FOR ORAL 3 G/1
POWDER ORAL
Qty: 6 G | Refills: 0 | Status: SHIPPED | OUTPATIENT
Start: 2021-04-20 | End: 2021-09-28

## 2021-04-21 ENCOUNTER — TELEPHONE (OUTPATIENT)
Dept: UROLOGY | Facility: CLINIC | Age: 36
End: 2021-04-21

## 2021-04-22 ENCOUNTER — TELEPHONE (OUTPATIENT)
Dept: RADIOLOGY | Facility: HOSPITAL | Age: 36
End: 2021-04-22

## 2021-04-23 ENCOUNTER — HOSPITAL ENCOUNTER (OUTPATIENT)
Dept: RADIOLOGY | Facility: HOSPITAL | Age: 36
Discharge: HOME | End: 2021-04-23
Attending: UROLOGY
Payer: MEDICAID

## 2021-04-23 DIAGNOSIS — N31.9 NEUROGENIC BLADDER: ICD-10-CM

## 2021-04-23 PROCEDURE — 76770 US EXAM ABDO BACK WALL COMP: CPT | Mod: TC

## 2021-04-23 PROCEDURE — 76770 US RETROPERITONEAL COMPLETE: ICD-10-PCS | Mod: 26,,, | Performed by: RADIOLOGY

## 2021-04-23 PROCEDURE — 76770 US EXAM ABDO BACK WALL COMP: CPT | Mod: 26,,, | Performed by: RADIOLOGY

## 2021-04-30 ENCOUNTER — TELEPHONE (OUTPATIENT)
Dept: UROLOGY | Facility: CLINIC | Age: 36
End: 2021-04-30

## 2021-04-30 DIAGNOSIS — N13.30 HYDRONEPHROSIS, UNSPECIFIED HYDRONEPHROSIS TYPE: ICD-10-CM

## 2021-05-03 ENCOUNTER — TELEPHONE (OUTPATIENT)
Dept: UROLOGY | Facility: CLINIC | Age: 36
End: 2021-05-03

## 2021-05-13 ENCOUNTER — HOSPITAL ENCOUNTER (OUTPATIENT)
Dept: RADIOLOGY | Facility: HOSPITAL | Age: 36
Discharge: HOME | End: 2021-05-13
Attending: UROLOGY
Payer: MEDICAID

## 2021-05-13 DIAGNOSIS — N13.30 HYDRONEPHROSIS, UNSPECIFIED HYDRONEPHROSIS TYPE: ICD-10-CM

## 2021-05-13 PROCEDURE — 74176 CT ABD & PELVIS W/O CONTRAST: CPT | Mod: TC

## 2021-07-23 ENCOUNTER — PATIENT MESSAGE (OUTPATIENT)
Dept: UROLOGY | Facility: CLINIC | Age: 36
End: 2021-07-23

## 2021-07-29 ENCOUNTER — PATIENT MESSAGE (OUTPATIENT)
Dept: UROLOGY | Facility: CLINIC | Age: 36
End: 2021-07-29

## 2021-09-23 ENCOUNTER — PATIENT MESSAGE (OUTPATIENT)
Dept: FAMILY MEDICINE | Facility: CLINIC | Age: 36
End: 2021-09-23

## 2021-09-23 DIAGNOSIS — R30.0 DYSURIA: Primary | ICD-10-CM

## 2021-09-24 ENCOUNTER — TELEPHONE (OUTPATIENT)
Dept: FAMILY MEDICINE | Facility: CLINIC | Age: 36
End: 2021-09-24

## 2021-09-24 ENCOUNTER — LAB VISIT (OUTPATIENT)
Dept: LAB | Facility: HOSPITAL | Age: 36
End: 2021-09-24
Attending: FAMILY MEDICINE
Payer: MEDICAID

## 2021-09-24 DIAGNOSIS — R30.0 DYSURIA: ICD-10-CM

## 2021-09-24 PROCEDURE — 87186 SC STD MICRODIL/AGAR DIL: CPT | Performed by: FAMILY MEDICINE

## 2021-09-24 PROCEDURE — 87086 URINE CULTURE/COLONY COUNT: CPT | Performed by: FAMILY MEDICINE

## 2021-09-24 PROCEDURE — 87077 CULTURE AEROBIC IDENTIFY: CPT | Performed by: FAMILY MEDICINE

## 2021-09-24 PROCEDURE — 87088 URINE BACTERIA CULTURE: CPT | Performed by: FAMILY MEDICINE

## 2021-09-27 ENCOUNTER — PATIENT MESSAGE (OUTPATIENT)
Dept: UROLOGY | Facility: CLINIC | Age: 36
End: 2021-09-27

## 2021-09-27 ENCOUNTER — PATIENT MESSAGE (OUTPATIENT)
Dept: FAMILY MEDICINE | Facility: CLINIC | Age: 36
End: 2021-09-27

## 2021-09-27 DIAGNOSIS — N12 PYELONEPHRITIS: ICD-10-CM

## 2021-09-27 DIAGNOSIS — N31.9 NEUROGENIC BLADDER: Primary | ICD-10-CM

## 2021-09-28 ENCOUNTER — PATIENT MESSAGE (OUTPATIENT)
Dept: FAMILY MEDICINE | Facility: CLINIC | Age: 36
End: 2021-09-28

## 2021-09-28 LAB — BACTERIA UR CULT: ABNORMAL

## 2021-09-28 RX ORDER — GRANULES FOR ORAL 3 G/1
POWDER ORAL
Qty: 6 G | Refills: 0 | Status: SHIPPED | OUTPATIENT
Start: 2021-09-28 | End: 2022-08-19

## 2021-10-19 ENCOUNTER — PATIENT MESSAGE (OUTPATIENT)
Dept: FAMILY MEDICINE | Facility: CLINIC | Age: 36
End: 2021-10-19
Payer: MEDICAID

## 2021-10-19 RX ORDER — KETOCONAZOLE 20 MG/G
CREAM TOPICAL DAILY
Qty: 1 TUBE | Refills: 1 | Status: SHIPPED | OUTPATIENT
Start: 2021-10-19 | End: 2022-12-18 | Stop reason: SDUPTHER

## 2021-12-07 ENCOUNTER — PATIENT MESSAGE (OUTPATIENT)
Dept: FAMILY MEDICINE | Facility: CLINIC | Age: 36
End: 2021-12-07
Payer: MEDICAID

## 2021-12-08 ENCOUNTER — TELEPHONE (OUTPATIENT)
Dept: FAMILY MEDICINE | Facility: CLINIC | Age: 36
End: 2021-12-08
Payer: MEDICAID

## 2021-12-08 ENCOUNTER — LAB VISIT (OUTPATIENT)
Dept: LAB | Facility: HOSPITAL | Age: 36
End: 2021-12-08
Attending: FAMILY MEDICINE
Payer: MEDICAID

## 2021-12-08 DIAGNOSIS — R30.0 DYSURIA: ICD-10-CM

## 2021-12-08 DIAGNOSIS — N31.9 NEUROGENIC BLADDER: Primary | ICD-10-CM

## 2021-12-08 DIAGNOSIS — N31.9 NEUROGENIC BLADDER: ICD-10-CM

## 2021-12-08 LAB
BACTERIA #/AREA URNS AUTO: ABNORMAL /HPF
BILIRUB UR QL STRIP: NEGATIVE
CLARITY UR REFRACT.AUTO: ABNORMAL
COLOR UR AUTO: ABNORMAL
GLUCOSE UR QL STRIP: NEGATIVE
HGB UR QL STRIP: ABNORMAL
HYALINE CASTS UR QL AUTO: 0 /LPF
KETONES UR QL STRIP: NEGATIVE
LEUKOCYTE ESTERASE UR QL STRIP: ABNORMAL
MICROSCOPIC COMMENT: ABNORMAL
NITRITE UR QL STRIP: POSITIVE
PH UR STRIP: 5 [PH] (ref 5–8)
PROT UR QL STRIP: ABNORMAL
RBC #/AREA URNS AUTO: 19 /HPF (ref 0–4)
SP GR UR STRIP: 1.01 (ref 1–1.03)
SQUAMOUS #/AREA URNS AUTO: 1 /HPF
URN SPEC COLLECT METH UR: ABNORMAL
WBC #/AREA URNS AUTO: >100 /HPF (ref 0–5)

## 2021-12-08 PROCEDURE — 87086 URINE CULTURE/COLONY COUNT: CPT | Performed by: FAMILY MEDICINE

## 2021-12-08 PROCEDURE — 87186 SC STD MICRODIL/AGAR DIL: CPT | Performed by: FAMILY MEDICINE

## 2021-12-08 PROCEDURE — 87077 CULTURE AEROBIC IDENTIFY: CPT | Performed by: FAMILY MEDICINE

## 2021-12-08 PROCEDURE — 87184 SC STD DISK METHOD PER PLATE: CPT | Performed by: FAMILY MEDICINE

## 2021-12-08 PROCEDURE — 87088 URINE BACTERIA CULTURE: CPT | Performed by: FAMILY MEDICINE

## 2021-12-08 PROCEDURE — 81001 URINALYSIS AUTO W/SCOPE: CPT | Performed by: FAMILY MEDICINE

## 2021-12-10 ENCOUNTER — PATIENT MESSAGE (OUTPATIENT)
Dept: FAMILY MEDICINE | Facility: CLINIC | Age: 36
End: 2021-12-10
Payer: MEDICAID

## 2021-12-12 RX ORDER — GRANULES FOR ORAL 3 G/1
3 POWDER ORAL ONCE
Qty: 3 G | Refills: 0 | Status: SHIPPED | OUTPATIENT
Start: 2021-12-12 | End: 2021-12-12

## 2021-12-13 ENCOUNTER — PATIENT MESSAGE (OUTPATIENT)
Dept: FAMILY MEDICINE | Facility: CLINIC | Age: 36
End: 2021-12-13
Payer: MEDICAID

## 2021-12-13 LAB — BACTERIA UR CULT: ABNORMAL

## 2022-04-13 ENCOUNTER — LAB VISIT (OUTPATIENT)
Dept: LAB | Facility: HOSPITAL | Age: 37
End: 2022-04-13
Attending: FAMILY MEDICINE
Payer: MEDICAID

## 2022-04-13 ENCOUNTER — PATIENT MESSAGE (OUTPATIENT)
Dept: FAMILY MEDICINE | Facility: CLINIC | Age: 37
End: 2022-04-13
Payer: MEDICAID

## 2022-04-13 ENCOUNTER — TELEPHONE (OUTPATIENT)
Dept: FAMILY MEDICINE | Facility: CLINIC | Age: 37
End: 2022-04-13
Payer: MEDICAID

## 2022-04-13 DIAGNOSIS — R30.0 DYSURIA: ICD-10-CM

## 2022-04-13 DIAGNOSIS — R30.0 DYSURIA: Primary | ICD-10-CM

## 2022-04-13 LAB
BACTERIA #/AREA URNS AUTO: ABNORMAL /HPF
BILIRUB UR QL STRIP: NEGATIVE
CLARITY UR REFRACT.AUTO: ABNORMAL
COLOR UR AUTO: YELLOW
GLUCOSE UR QL STRIP: NEGATIVE
HGB UR QL STRIP: ABNORMAL
HYALINE CASTS UR QL AUTO: 0 /LPF
KETONES UR QL STRIP: NEGATIVE
LEUKOCYTE ESTERASE UR QL STRIP: ABNORMAL
MICROSCOPIC COMMENT: ABNORMAL
NITRITE UR QL STRIP: NEGATIVE
NON-SQ EPI CELLS #/AREA URNS AUTO: 3 /HPF
PH UR STRIP: 6 [PH] (ref 5–8)
PROT UR QL STRIP: ABNORMAL
RBC #/AREA URNS AUTO: 7 /HPF (ref 0–4)
SP GR UR STRIP: 1.01 (ref 1–1.03)
SQUAMOUS #/AREA URNS AUTO: 1 /HPF
URN SPEC COLLECT METH UR: ABNORMAL
WBC #/AREA URNS AUTO: >100 /HPF (ref 0–5)
WBC CLUMPS UR QL AUTO: ABNORMAL

## 2022-04-13 PROCEDURE — 87086 URINE CULTURE/COLONY COUNT: CPT | Performed by: FAMILY MEDICINE

## 2022-04-13 PROCEDURE — 87088 URINE BACTERIA CULTURE: CPT | Performed by: FAMILY MEDICINE

## 2022-04-13 PROCEDURE — 87186 SC STD MICRODIL/AGAR DIL: CPT | Performed by: FAMILY MEDICINE

## 2022-04-13 PROCEDURE — 87077 CULTURE AEROBIC IDENTIFY: CPT | Performed by: FAMILY MEDICINE

## 2022-04-13 PROCEDURE — 81001 URINALYSIS AUTO W/SCOPE: CPT | Performed by: FAMILY MEDICINE

## 2022-04-15 LAB — BACTERIA UR CULT: ABNORMAL

## 2022-04-16 RX ORDER — GRANULES FOR ORAL 3 G/1
3 POWDER ORAL ONCE
Qty: 3 G | Refills: 0 | Status: SHIPPED | OUTPATIENT
Start: 2022-04-16 | End: 2022-04-16

## 2022-04-27 ENCOUNTER — PATIENT MESSAGE (OUTPATIENT)
Dept: ADMINISTRATIVE | Facility: HOSPITAL | Age: 37
End: 2022-04-27
Payer: MEDICAID

## 2022-04-28 ENCOUNTER — PATIENT MESSAGE (OUTPATIENT)
Dept: FAMILY MEDICINE | Facility: CLINIC | Age: 37
End: 2022-04-28
Payer: MEDICAID

## 2022-05-18 ENCOUNTER — OFFICE VISIT (OUTPATIENT)
Dept: FAMILY MEDICINE | Facility: CLINIC | Age: 37
End: 2022-05-18
Payer: MEDICAID

## 2022-05-18 VITALS
BODY MASS INDEX: 35.04 KG/M2 | DIASTOLIC BLOOD PRESSURE: 85 MMHG | WEIGHT: 173.81 LBS | OXYGEN SATURATION: 99 % | HEIGHT: 59 IN | TEMPERATURE: 98 F | HEART RATE: 76 BPM | SYSTOLIC BLOOD PRESSURE: 132 MMHG

## 2022-05-18 DIAGNOSIS — Z00.00 WELL ADULT EXAM: Primary | ICD-10-CM

## 2022-05-18 DIAGNOSIS — N20.0 RENAL STONE: ICD-10-CM

## 2022-05-18 DIAGNOSIS — N31.9 NEUROGENIC BLADDER: ICD-10-CM

## 2022-05-18 PROCEDURE — 3008F PR BODY MASS INDEX (BMI) DOCUMENTED: ICD-10-PCS | Mod: CPTII,,, | Performed by: FAMILY MEDICINE

## 2022-05-18 PROCEDURE — 99395 PR PREVENTIVE VISIT,EST,18-39: ICD-10-PCS | Mod: S$PBB,,, | Performed by: FAMILY MEDICINE

## 2022-05-18 PROCEDURE — 3079F PR MOST RECENT DIASTOLIC BLOOD PRESSURE 80-89 MM HG: ICD-10-PCS | Mod: CPTII,,, | Performed by: FAMILY MEDICINE

## 2022-05-18 PROCEDURE — 3075F PR MOST RECENT SYSTOLIC BLOOD PRESS GE 130-139MM HG: ICD-10-PCS | Mod: CPTII,,, | Performed by: FAMILY MEDICINE

## 2022-05-18 PROCEDURE — 1159F PR MEDICATION LIST DOCUMENTED IN MEDICAL RECORD: ICD-10-PCS | Mod: CPTII,,, | Performed by: FAMILY MEDICINE

## 2022-05-18 PROCEDURE — 3008F BODY MASS INDEX DOCD: CPT | Mod: CPTII,,, | Performed by: FAMILY MEDICINE

## 2022-05-18 PROCEDURE — 99999 PR PBB SHADOW E&M-EST. PATIENT-LVL III: CPT | Mod: PBBFAC,,, | Performed by: FAMILY MEDICINE

## 2022-05-18 PROCEDURE — 3079F DIAST BP 80-89 MM HG: CPT | Mod: CPTII,,, | Performed by: FAMILY MEDICINE

## 2022-05-18 PROCEDURE — 99999 PR PBB SHADOW E&M-EST. PATIENT-LVL III: ICD-10-PCS | Mod: PBBFAC,,, | Performed by: FAMILY MEDICINE

## 2022-05-18 PROCEDURE — 99213 OFFICE O/P EST LOW 20 MIN: CPT | Mod: PBBFAC,PO | Performed by: FAMILY MEDICINE

## 2022-05-18 PROCEDURE — 90715 TDAP VACCINE 7 YRS/> IM: CPT | Mod: PBBFAC,PO

## 2022-05-18 PROCEDURE — 99395 PREV VISIT EST AGE 18-39: CPT | Mod: S$PBB,,, | Performed by: FAMILY MEDICINE

## 2022-05-18 PROCEDURE — 3075F SYST BP GE 130 - 139MM HG: CPT | Mod: CPTII,,, | Performed by: FAMILY MEDICINE

## 2022-05-18 PROCEDURE — 1159F MED LIST DOCD IN RCRD: CPT | Mod: CPTII,,, | Performed by: FAMILY MEDICINE

## 2022-05-18 NOTE — PROGRESS NOTES
Chief Complaint:    Chief Complaint   Patient presents with    Annual Exam       History of Present Illness:  Patient with neurogenic bladder presents today for a follow-up    Doing good, no complaints.   Patient has underlying neurogenic bladder secondary to spina bifida and does intermittent catheterization. Still doing catheterization, not on preventative antibiotic anymore. Has kidney stones, probably causing infection. Was supposed have surgery but it was never scheduled due to COVID. Following with Dr. Reed. Her left kidney is atrophied. Her right kidney has hydronephrosis.   Due for pap smear, tetanus shot.  Denies CP, SOB, or constipation. Denies depression or anxiety.        ROS:  Review of Systems   Constitutional: Negative for appetite change, chills and fever.   HENT: Negative for congestion, ear pain, postnasal drip, rhinorrhea, sinus pressure and sinus pain.    Eyes: Negative for pain.   Respiratory: Negative for cough, chest tightness and shortness of breath.    Cardiovascular: Negative for chest pain and palpitations.   Gastrointestinal: Negative for abdominal pain, blood in stool, constipation, diarrhea and nausea.   Genitourinary: Negative for difficulty urinating, dysuria, flank pain and hematuria.   Musculoskeletal: Negative for arthralgias and myalgias.   Skin: Negative for pallor and wound.   Neurological: Negative for dizziness, tremors, speech difficulty, light-headedness and headaches.   Psychiatric/Behavioral: Negative for behavioral problems, dysphoric mood and sleep disturbance. The patient is not nervous/anxious.    All other systems reviewed and are negative.      Past Medical History:   Diagnosis Date    Spina bifida     Spina bifida        Social History:  Social History     Socioeconomic History    Marital status: Single   Tobacco Use    Smoking status: Never Smoker    Smokeless tobacco: Never Used   Substance and Sexual Activity    Alcohol use: Yes     Comment:  "Occaisonally    Drug use: Never    Sexual activity: Never       Family History:   family history includes Diabetes in her father and paternal grandmother; Hyperlipidemia in her father and mother; Hypertension in her mother.    Health Maintenance   Topic Date Due    Hepatitis C Screening  Never done    TETANUS VACCINE  Never done    Lipid Panel  Completed       Physical Exam:    Vital Signs  Temp: 98.1 °F (36.7 °C)  Pulse: 76  SpO2: 99 %  BP: 132/85  Pain Score: 0-No pain  Height and Weight  Height: 4' 11" (149.9 cm)  Weight: 78.8 kg (173 lb 13.3 oz)  BSA (Calculated - sq m): 1.81 sq meters  BMI (Calculated): 35.1  Weight in (lb) to have BMI = 25: 123.5]    Body mass index is 35.11 kg/m².    Physical Exam  Vitals and nursing note reviewed.   Constitutional:       Appearance: Normal appearance. She is not toxic-appearing.   HENT:      Head: Normocephalic and atraumatic.      Right Ear: Tympanic membrane normal.      Left Ear: Tympanic membrane normal.   Eyes:      Extraocular Movements: Extraocular movements intact.      Pupils: Pupils are equal, round, and reactive to light.   Cardiovascular:      Rate and Rhythm: Normal rate and regular rhythm.      Heart sounds: Normal heart sounds.   Pulmonary:      Effort: Pulmonary effort is normal.      Breath sounds: Normal breath sounds. No wheezing, rhonchi or rales.   Abdominal:      General: Bowel sounds are normal. There is no distension.      Palpations: Abdomen is soft.      Tenderness: There is no abdominal tenderness.   Musculoskeletal:         General: Normal range of motion.      Cervical back: Normal range of motion.   Skin:     General: Skin is warm and dry.      Capillary Refill: Capillary refill takes less than 2 seconds.   Neurological:      General: No focal deficit present.      Mental Status: She is alert and oriented to person, place, and time.   Psychiatric:         Mood and Affect: Mood normal.         Behavior: Behavior normal.         Judgment: " Judgment normal.           Assessment:      ICD-10-CM ICD-9-CM   1. Well adult exam  Z00.00 V70.0   2. Neurogenic bladder  N31.9 596.54   3. Renal stone  N20.0 592.0     Plan:       Refer to Dr. Reed for complex urologic condition patient will see Dr. Reed for explanation the condition and possible referral to [facility] or other location because patient would like to stay local.  Not on UTI prophylaxis at this time  Schedule pap smear.   Get Tdap vaccine today.   See labs below.   Follow up 1 year or sooner.  Orders Placed This Encounter   Procedures    Tdap Vaccine    CBC Auto Differential    Comprehensive Metabolic Panel    Lipid Panel    HIV 1/2 Ag/Ab (4th Gen)    Hepatitis C Antibody    Hemoglobin A1C    Ambulatory referral/consult to Urology       Current Outpatient Medications   Medication Sig Dispense Refill    desogestreL-ethinyl estradioL (ISIBLOOM) 0.15-0.03 mg per tablet Take 1 tablet by mouth once daily. 28 tablet 5    ketoconazole (NIZORAL) 2 % cream Apply topically once daily. 1 Tube 1    fosfomycin (MONUROL) 3 gram Pack Take 3g po on day 1. Repeat after 72 hours. (Patient not taking: Reported on 5/18/2022) 6 g 0    ISIBLOOM 0.15-0.03 mg per tablet TAKE 1 TABLET DAILY FOR BIRTH CONTROL (Patient not taking: Reported on 5/18/2022) 28 tablet 0    ISIBLOOM 0.15-0.03 mg per tablet TAKE 1 TABLET DAILY FOR BIRTH CONTROL (Patient not taking: Reported on 5/18/2022) 28 tablet 0    ISIBLOOM 0.15-0.03 mg per tablet TAKE 1 TABLET DAILY FOR BIRTH CONTROL (Patient not taking: Reported on 5/18/2022) 28 tablet 0    UNABLE TO FIND Urinary Catheter:  12 Malagasy long tip male straight cath, 1 each for in and out caths 4 times daily  Dispense # 120 (Patient not taking: Reported on 5/18/2022) 1 each 0     No current facility-administered medications for this visit.       There are no discontinued medications.    Follow up in about 2 weeks (around 6/1/2022).      Madie Horn MD  Scribe Attestation:    I, Gail Camacho, am scribing for, and in the presence of, Dr.Arif Horn I performed the above scribed service and the documentation accurately describes the services I performed. I attest to the accuracy of the note.    I, Dr. Madie Horn, reviewed documentation as scribed above. I performed the services described in this documentation.  I agree that the record reflects my personal performance and is accurate and complete. Madie Horn MD.  05/18/2022

## 2022-05-27 NOTE — TELEPHONE ENCOUNTER
No new care gaps identified.  [facility] Embedded Care Gaps. Reference number: [id number]. 5/27/2022   4:59:36 PM CDT

## 2022-05-27 NOTE — TELEPHONE ENCOUNTER
Refill Routing Note   Medication(s) are not appropriate for processing by [facility] for the following reason(s):      - Duplicate Medication/Therapy: ISIBLOOM on med list x 4    [facility] action(s):  Defer Medication-related problems identified: Therapeutic duplication        Medication reconciliation completed: No     Appointments  past 12m or future 3m with PCP    Date Provider   Last Visit   5/18/2022 Madie Horn MD   Next Visit   6/22/2022 Madie Horn MD   ED visits in past 90 days: 0        Note composed:5:20 PM 05/27/2022

## 2022-05-29 RX ORDER — DESOGESTREL AND ETHINYL ESTRADIOL 0.15-0.03
KIT ORAL
Qty: 84 TABLET | Refills: 3 | Status: SHIPPED | OUTPATIENT
Start: 2022-05-29 | End: 2023-05-05

## 2022-06-01 ENCOUNTER — LAB VISIT (OUTPATIENT)
Dept: LAB | Facility: HOSPITAL | Age: 37
End: 2022-06-01
Attending: FAMILY MEDICINE
Payer: MEDICAID

## 2022-06-01 DIAGNOSIS — N20.0 RENAL STONE: ICD-10-CM

## 2022-06-01 DIAGNOSIS — N31.9 NEUROGENIC BLADDER: ICD-10-CM

## 2022-06-01 DIAGNOSIS — Z00.00 WELL ADULT EXAM: ICD-10-CM

## 2022-06-01 LAB
ALBUMIN SERPL BCP-MCNC: 3.9 G/DL (ref 3.5–5.2)
ALP SERPL-CCNC: 71 U/L (ref 55–135)
ALT SERPL W/O P-5'-P-CCNC: 91 U/L (ref 10–44)
ANION GAP SERPL CALC-SCNC: 8 MMOL/L (ref 8–16)
AST SERPL-CCNC: 32 U/L (ref 10–40)
BASOPHILS # BLD AUTO: 0.05 K/UL (ref 0–0.2)
BASOPHILS NFR BLD: 0.7 % (ref 0–1.9)
BILIRUB SERPL-MCNC: 0.4 MG/DL (ref 0.1–1)
BUN SERPL-MCNC: 14 MG/DL (ref 6–20)
CALCIUM SERPL-MCNC: 9.3 MG/DL (ref 8.7–10.5)
CHLORIDE SERPL-SCNC: 109 MMOL/L (ref 95–110)
CHOLEST SERPL-MCNC: 218 MG/DL (ref 120–199)
CHOLEST/HDLC SERPL: 4 {RATIO} (ref 2–5)
CO2 SERPL-SCNC: 21 MMOL/L (ref 23–29)
CREAT SERPL-MCNC: 1.1 MG/DL (ref 0.5–1.4)
DIFFERENTIAL METHOD: ABNORMAL
EOSINOPHIL # BLD AUTO: 0.1 K/UL (ref 0–0.5)
EOSINOPHIL NFR BLD: 1.4 % (ref 0–8)
ERYTHROCYTE [DISTWIDTH] IN BLOOD BY AUTOMATED COUNT: 14.3 % (ref 11.5–14.5)
EST. GFR  (AFRICAN AMERICAN): >60 ML/MIN/1.73 M^2
EST. GFR  (NON AFRICAN AMERICAN): >60 ML/MIN/1.73 M^2
ESTIMATED AVG GLUCOSE: 117 MG/DL (ref 68–131)
GLUCOSE SERPL-MCNC: 103 MG/DL (ref 70–110)
HBA1C MFR BLD: 5.7 % (ref 4–5.6)
HCT VFR BLD AUTO: 36.5 % (ref 37–48.5)
HDLC SERPL-MCNC: 54 MG/DL (ref 40–75)
HDLC SERPL: 24.8 % (ref 20–50)
HGB BLD-MCNC: 11.2 G/DL (ref 12–16)
IMM GRANULOCYTES # BLD AUTO: 0.01 K/UL (ref 0–0.04)
IMM GRANULOCYTES NFR BLD AUTO: 0.1 % (ref 0–0.5)
LDLC SERPL CALC-MCNC: 141.2 MG/DL (ref 63–159)
LYMPHOCYTES # BLD AUTO: 2.5 K/UL (ref 1–4.8)
LYMPHOCYTES NFR BLD: 34 % (ref 18–48)
MCH RBC QN AUTO: 27 PG (ref 27–31)
MCHC RBC AUTO-ENTMCNC: 30.7 G/DL (ref 32–36)
MCV RBC AUTO: 88 FL (ref 82–98)
MONOCYTES # BLD AUTO: 0.5 K/UL (ref 0.3–1)
MONOCYTES NFR BLD: 7.2 % (ref 4–15)
NEUTROPHILS # BLD AUTO: 4.1 K/UL (ref 1.8–7.7)
NEUTROPHILS NFR BLD: 56.6 % (ref 38–73)
NONHDLC SERPL-MCNC: 164 MG/DL
NRBC BLD-RTO: 0 /100 WBC
PLATELET # BLD AUTO: 316 K/UL (ref 150–450)
PMV BLD AUTO: 11 FL (ref 9.2–12.9)
POTASSIUM SERPL-SCNC: 4.3 MMOL/L (ref 3.5–5.1)
PROT SERPL-MCNC: 7.1 G/DL (ref 6–8.4)
RBC # BLD AUTO: 4.15 M/UL (ref 4–5.4)
SODIUM SERPL-SCNC: 138 MMOL/L (ref 136–145)
TRIGL SERPL-MCNC: 114 MG/DL (ref 30–150)
WBC # BLD AUTO: 7.21 K/UL (ref 3.9–12.7)

## 2022-06-01 PROCEDURE — 87389 HIV-1 AG W/HIV-1&-2 AB AG IA: CPT | Performed by: FAMILY MEDICINE

## 2022-06-01 PROCEDURE — 80053 COMPREHEN METABOLIC PANEL: CPT | Performed by: FAMILY MEDICINE

## 2022-06-01 PROCEDURE — 85025 COMPLETE CBC W/AUTO DIFF WBC: CPT | Performed by: FAMILY MEDICINE

## 2022-06-01 PROCEDURE — 86803 HEPATITIS C AB TEST: CPT | Performed by: FAMILY MEDICINE

## 2022-06-01 PROCEDURE — 36415 COLL VENOUS BLD VENIPUNCTURE: CPT | Mod: PO | Performed by: FAMILY MEDICINE

## 2022-06-01 PROCEDURE — 80061 LIPID PANEL: CPT | Performed by: FAMILY MEDICINE

## 2022-06-01 PROCEDURE — 83036 HEMOGLOBIN GLYCOSYLATED A1C: CPT | Performed by: FAMILY MEDICINE

## 2022-06-02 LAB
HCV AB SERPL QL IA: NEGATIVE
HIV 1+2 AB+HIV1 P24 AG SERPL QL IA: NEGATIVE

## 2022-06-09 ENCOUNTER — PATIENT MESSAGE (OUTPATIENT)
Dept: FAMILY MEDICINE | Facility: CLINIC | Age: 37
End: 2022-06-09
Payer: MEDICAID

## 2022-06-22 ENCOUNTER — OFFICE VISIT (OUTPATIENT)
Dept: FAMILY MEDICINE | Facility: CLINIC | Age: 37
End: 2022-06-22
Payer: MEDICAID

## 2022-06-22 ENCOUNTER — LAB VISIT (OUTPATIENT)
Dept: LAB | Facility: HOSPITAL | Age: 37
End: 2022-06-22
Attending: FAMILY MEDICINE
Payer: MEDICAID

## 2022-06-22 VITALS
SYSTOLIC BLOOD PRESSURE: 96 MMHG | HEIGHT: 59 IN | TEMPERATURE: 98 F | WEIGHT: 165 LBS | OXYGEN SATURATION: 99 % | HEART RATE: 80 BPM | DIASTOLIC BLOOD PRESSURE: 82 MMHG | BODY MASS INDEX: 33.26 KG/M2

## 2022-06-22 DIAGNOSIS — R74.8 ELEVATED LIVER ENZYMES: ICD-10-CM

## 2022-06-22 DIAGNOSIS — Z01.419 WELL WOMAN EXAM WITH ROUTINE GYNECOLOGICAL EXAM: Primary | ICD-10-CM

## 2022-06-22 LAB
ALBUMIN SERPL BCP-MCNC: 3.7 G/DL (ref 3.5–5.2)
ALP SERPL-CCNC: 104 U/L (ref 55–135)
ALT SERPL W/O P-5'-P-CCNC: 70 U/L (ref 10–44)
ANION GAP SERPL CALC-SCNC: 8 MMOL/L (ref 8–16)
AST SERPL-CCNC: 42 U/L (ref 10–40)
BILIRUB SERPL-MCNC: 0.5 MG/DL (ref 0.1–1)
BUN SERPL-MCNC: 11 MG/DL (ref 6–20)
CALCIUM SERPL-MCNC: 9.7 MG/DL (ref 8.7–10.5)
CHLORIDE SERPL-SCNC: 108 MMOL/L (ref 95–110)
CO2 SERPL-SCNC: 24 MMOL/L (ref 23–29)
CREAT SERPL-MCNC: 0.9 MG/DL (ref 0.5–1.4)
EST. GFR  (AFRICAN AMERICAN): >60 ML/MIN/1.73 M^2
EST. GFR  (NON AFRICAN AMERICAN): >60 ML/MIN/1.73 M^2
GLUCOSE SERPL-MCNC: 97 MG/DL (ref 70–110)
POTASSIUM SERPL-SCNC: 4.6 MMOL/L (ref 3.5–5.1)
PROT SERPL-MCNC: 7 G/DL (ref 6–8.4)
SODIUM SERPL-SCNC: 140 MMOL/L (ref 136–145)

## 2022-06-22 PROCEDURE — 3074F PR MOST RECENT SYSTOLIC BLOOD PRESSURE < 130 MM HG: ICD-10-PCS | Mod: CPTII,,, | Performed by: FAMILY MEDICINE

## 2022-06-22 PROCEDURE — 99395 PREV VISIT EST AGE 18-39: CPT | Mod: S$PBB,,, | Performed by: FAMILY MEDICINE

## 2022-06-22 PROCEDURE — 3079F DIAST BP 80-89 MM HG: CPT | Mod: CPTII,,, | Performed by: FAMILY MEDICINE

## 2022-06-22 PROCEDURE — 1159F PR MEDICATION LIST DOCUMENTED IN MEDICAL RECORD: ICD-10-PCS | Mod: CPTII,,, | Performed by: FAMILY MEDICINE

## 2022-06-22 PROCEDURE — 88175 CYTOPATH C/V AUTO FLUID REDO: CPT | Performed by: FAMILY MEDICINE

## 2022-06-22 PROCEDURE — 99213 OFFICE O/P EST LOW 20 MIN: CPT | Mod: PBBFAC,PO | Performed by: FAMILY MEDICINE

## 2022-06-22 PROCEDURE — 99999 PR PBB SHADOW E&M-EST. PATIENT-LVL III: CPT | Mod: PBBFAC,,, | Performed by: FAMILY MEDICINE

## 2022-06-22 PROCEDURE — 87624 HPV HI-RISK TYP POOLED RSLT: CPT | Mod: 91 | Performed by: FAMILY MEDICINE

## 2022-06-22 PROCEDURE — 3008F BODY MASS INDEX DOCD: CPT | Mod: CPTII,,, | Performed by: FAMILY MEDICINE

## 2022-06-22 PROCEDURE — 87624 HPV HI-RISK TYP POOLED RSLT: CPT | Performed by: FAMILY MEDICINE

## 2022-06-22 PROCEDURE — 87491 CHLMYD TRACH DNA AMP PROBE: CPT | Mod: 59 | Performed by: FAMILY MEDICINE

## 2022-06-22 PROCEDURE — 80053 COMPREHEN METABOLIC PANEL: CPT | Performed by: FAMILY MEDICINE

## 2022-06-22 PROCEDURE — 87591 N.GONORRHOEAE DNA AMP PROB: CPT | Mod: 59 | Performed by: FAMILY MEDICINE

## 2022-06-22 PROCEDURE — 3074F SYST BP LT 130 MM HG: CPT | Mod: CPTII,,, | Performed by: FAMILY MEDICINE

## 2022-06-22 PROCEDURE — 3044F HG A1C LEVEL LT 7.0%: CPT | Mod: CPTII,,, | Performed by: FAMILY MEDICINE

## 2022-06-22 PROCEDURE — 99395 PR PREVENTIVE VISIT,EST,18-39: ICD-10-PCS | Mod: S$PBB,,, | Performed by: FAMILY MEDICINE

## 2022-06-22 PROCEDURE — 36415 COLL VENOUS BLD VENIPUNCTURE: CPT | Mod: PO | Performed by: FAMILY MEDICINE

## 2022-06-22 PROCEDURE — 3079F PR MOST RECENT DIASTOLIC BLOOD PRESSURE 80-89 MM HG: ICD-10-PCS | Mod: CPTII,,, | Performed by: FAMILY MEDICINE

## 2022-06-22 PROCEDURE — 3008F PR BODY MASS INDEX (BMI) DOCUMENTED: ICD-10-PCS | Mod: CPTII,,, | Performed by: FAMILY MEDICINE

## 2022-06-22 PROCEDURE — 99999 PR PBB SHADOW E&M-EST. PATIENT-LVL III: ICD-10-PCS | Mod: PBBFAC,,, | Performed by: FAMILY MEDICINE

## 2022-06-22 PROCEDURE — 87481 CANDIDA DNA AMP PROBE: CPT | Mod: 59 | Performed by: FAMILY MEDICINE

## 2022-06-22 PROCEDURE — 87801 DETECT AGNT MULT DNA AMPLI: CPT | Performed by: FAMILY MEDICINE

## 2022-06-22 PROCEDURE — 1159F MED LIST DOCD IN RCRD: CPT | Mod: CPTII,,, | Performed by: FAMILY MEDICINE

## 2022-06-22 PROCEDURE — 3044F PR MOST RECENT HEMOGLOBIN A1C LEVEL <7.0%: ICD-10-PCS | Mod: CPTII,,, | Performed by: FAMILY MEDICINE

## 2022-06-22 NOTE — PROGRESS NOTES
Chief Complaint:    Chief Complaint   Patient presents with    Gynecologic Exam    Results       History of Present Illness:  Patient with neurogenic bladder presents today for a pap smear     Never had an abnormal pap smear  ALT is 91, didn't have alcohol before labs.   Cholesterol has gone up to 218.   Says she's on a diet and has lost weight  A1C is borderline.       Patient has underlying neurogenic bladder secondary to spina bifida and does intermittent catheterization. Still doing catheterization, not on preventative antibiotic anymore. Has kidney stones, probably causing infection. Was supposed have surgery but it was never scheduled due to COVID. Following with Dr. Reed. Her left kidney is atrophied. Her right kidney has hydronephrosis.          ROS:  Review of Systems   Constitutional: Negative for appetite change, chills and fever.   HENT: Negative for congestion, ear pain, postnasal drip, rhinorrhea, sinus pressure and sinus pain.    Eyes: Negative for pain.   Respiratory: Negative for cough, chest tightness and shortness of breath.    Cardiovascular: Negative for chest pain and palpitations.   Gastrointestinal: Negative for abdominal pain, blood in stool, constipation, diarrhea and nausea.   Genitourinary: Negative for difficulty urinating, dysuria, flank pain and hematuria.   Musculoskeletal: Negative for arthralgias and myalgias.   Skin: Negative for pallor and wound.   Neurological: Negative for dizziness, tremors, speech difficulty, light-headedness and headaches.   Psychiatric/Behavioral: Negative for behavioral problems, dysphoric mood and sleep disturbance. The patient is not nervous/anxious.    All other systems reviewed and are negative.      Past Medical History:   Diagnosis Date    Spina bifida     Spina bifida        Social History:  Social History     Socioeconomic History    Marital status: Single   Tobacco Use    Smoking status: Never Smoker    Smokeless tobacco: Never Used  "  Substance and Sexual Activity    Alcohol use: Yes     Comment: Occaisonally    Drug use: Never    Sexual activity: Never       Family History:   family history includes Diabetes in her father and paternal grandmother; Hyperlipidemia in her father and mother; Hypertension in her mother.    Health Maintenance   Topic Date Due    TETANUS VACCINE  05/18/2032    Hepatitis C Screening  Completed    Lipid Panel  Completed       Physical Exam:    Vital Signs  Temp: 97.9 °F (36.6 °C)  Temp src: Temporal  Pulse: 80  SpO2: 99 %  BP: 96/82  BP Location: Left arm  Patient Position: Sitting  Pain Score: 0-No pain  Height and Weight  Height: 4' 11" (149.9 cm)  Weight: 74.9 kg (165 lb 0.2 oz)  BSA (Calculated - sq m): 1.77 sq meters  BMI (Calculated): 33.3  Weight in (lb) to have BMI = 25: 123.5]    Body mass index is 33.33 kg/m².    Physical Exam  Vitals and nursing note reviewed. Exam conducted with a chaperone present.   Constitutional:       Appearance: Normal appearance. She is not toxic-appearing.   HENT:      Head: Normocephalic and atraumatic.      Right Ear: Tympanic membrane normal.      Left Ear: Tympanic membrane normal.   Eyes:      Extraocular Movements: Extraocular movements intact.      Pupils: Pupils are equal, round, and reactive to light.   Cardiovascular:      Rate and Rhythm: Normal rate and regular rhythm.      Heart sounds: Normal heart sounds.   Pulmonary:      Effort: Pulmonary effort is normal.      Breath sounds: Normal breath sounds. No wheezing, rhonchi or rales.   Abdominal:      General: Bowel sounds are normal. There is no distension.      Palpations: Abdomen is soft.      Tenderness: There is no abdominal tenderness.   Genitourinary:     Vagina: Normal.      Cervix: Erythema present.      Adnexa: Right adnexa normal and left adnexa normal.        Right: No mass, tenderness or fullness.          Left: No mass, tenderness or fullness.     Musculoskeletal:         General: Normal range of " motion.      Cervical back: Normal range of motion.   Skin:     General: Skin is warm and dry.      Capillary Refill: Capillary refill takes less than 2 seconds.   Neurological:      General: No focal deficit present.      Mental Status: She is alert and oriented to person, place, and time.   Psychiatric:         Mood and Affect: Mood normal.         Behavior: Behavior normal.         Judgment: Judgment normal.           Assessment:      ICD-10-CM ICD-9-CM   1. Well woman exam with routine gynecological exam  Z01.419 V72.31   2. Elevated liver enzymes  R74.8 790.5     Plan:       Vaginosis, GC, and pap smear done today  Keep balanced diet.   See labs below.   Orders Placed This Encounter   Procedures    VAGINOSIS SCREEN BY DNA PROBE    C. trachomatis/N. gonorrhoeae by AMP DNA [facility]; Urine    HPV High Risk Genotypes, PCR    Comprehensive Metabolic Panel       Current Outpatient Medications   Medication Sig Dispense Refill    ISIBLOOM 0.15-0.03 mg per tablet TAKE 1 TABLET DAILY FOR BIRTH CONTROL 28 tablet 0    ISIBLOOM 0.15-0.03 mg per tablet TAKE 1 TABLET DAILY FOR BIRTH CONTROL 28 tablet 0    ISIBLOOM 0.15-0.03 mg per tablet TAKE 1 TABLET DAILY FOR BIRTH CONTROL 84 tablet 3    ketoconazole (NIZORAL) 2 % cream Apply topically once daily. 1 Tube 1    UNABLE TO FIND Urinary Catheter:  12 Thai long tip male straight cath, 1 each for in and out caths 4 times daily  Dispense # 120 1 each 0    fosfomycin (MONUROL) 3 gram Pack Take 3g po on day 1. Repeat after 72 hours. (Patient not taking: Reported on 5/18/2022) 6 g 0    ISIBLOOM 0.15-0.03 mg per tablet TAKE 1 TABLET DAILY FOR BIRTH CONTROL (Patient not taking: Reported on 5/18/2022) 28 tablet 0     No current facility-administered medications for this visit.       There are no discontinued medications.    No follow-ups on file.      Madie Horn MD  Scribe Attestation:   Gail BREEN, am scribing for, and in the presence of, Dr.Arif Justina BREEN performed  the above scribed service and the documentation accurately describes the services I performed. I attest to the accuracy of the note.    I, Dr. Madie Horn, reviewed documentation as scribed above. I performed the services described in this documentation.  I agree that the record reflects my personal performance and is accurate and complete. Madie Horn MD.  06/22/2022

## 2022-06-24 LAB
BACTERIAL VAGINOSIS DNA: NEGATIVE
CANDIDA GLABRATA DNA: NEGATIVE
CANDIDA KRUSEI DNA: NEGATIVE
CANDIDA RRNA VAG QL PROBE: NEGATIVE
T VAGINALIS RRNA GENITAL QL PROBE: NEGATIVE

## 2022-06-26 LAB
C TRACH DNA SPEC QL NAA+PROBE: NOT DETECTED
N GONORRHOEA DNA SPEC QL NAA+PROBE: NOT DETECTED

## 2022-07-06 ENCOUNTER — TELEPHONE (OUTPATIENT)
Dept: FAMILY MEDICINE | Facility: CLINIC | Age: 37
End: 2022-07-06
Payer: MEDICAID

## 2022-07-06 DIAGNOSIS — R74.8 ELEVATED LIVER ENZYMES: Primary | ICD-10-CM

## 2022-07-06 LAB
CLINICAL INFO: NORMAL
CYTO CVX: NORMAL
CYTOLOGIST CVX/VAG CYTO: NORMAL
CYTOLOGIST CVX/VAG CYTO: NORMAL
CYTOLOGY CMNT CVX/VAG CYTO-IMP: NORMAL
CYTOLOGY PAP THIN PREP EXPLANATION: NORMAL
DATE OF PREVIOUS PAP: NORMAL
DATE PREVIOUS BX: NO
GEN CATEG CVX/VAG CYTO-IMP: NORMAL
HPV E6+E7 MRNA CVX QL NAA+PROBE: NOT DETECTED
HPV I/H RISK 4 DNA CVX QL NAA+PROBE: NORMAL
LMP START DATE: NORMAL
MICROORGANISM CVX/VAG CYTO: NORMAL
PATHOLOGIST CVX/VAG CYTO: NORMAL
SERVICE CMNT-IMP: NORMAL
SPECIMEN SOURCE CVX/VAG CYTO: NORMAL
STAT OF ADQ CVX/VAG CYTO-IMP: NORMAL

## 2022-07-07 ENCOUNTER — PATIENT MESSAGE (OUTPATIENT)
Dept: FAMILY MEDICINE | Facility: CLINIC | Age: 37
End: 2022-07-07
Payer: MEDICAID

## 2022-08-08 ENCOUNTER — PATIENT MESSAGE (OUTPATIENT)
Dept: FAMILY MEDICINE | Facility: CLINIC | Age: 37
End: 2022-08-08
Payer: MEDICAID

## 2022-08-08 RX ORDER — BENZONATATE 200 MG/1
200 CAPSULE ORAL 3 TIMES DAILY PRN
Qty: 30 CAPSULE | Refills: 0 | Status: SHIPPED | OUTPATIENT
Start: 2022-08-08 | End: 2022-08-18

## 2022-08-18 ENCOUNTER — PATIENT MESSAGE (OUTPATIENT)
Dept: FAMILY MEDICINE | Facility: CLINIC | Age: 37
End: 2022-08-18
Payer: MEDICAID

## 2022-08-18 ENCOUNTER — TELEPHONE (OUTPATIENT)
Dept: FAMILY MEDICINE | Facility: CLINIC | Age: 37
End: 2022-08-18
Payer: MEDICAID

## 2022-08-18 DIAGNOSIS — R30.0 DYSURIA: Primary | ICD-10-CM

## 2022-08-19 ENCOUNTER — TELEPHONE (OUTPATIENT)
Dept: FAMILY MEDICINE | Facility: CLINIC | Age: 37
End: 2022-08-19
Payer: MEDICAID

## 2022-08-19 RX ORDER — NITROFURANTOIN 25; 75 MG/1; MG/1
100 CAPSULE ORAL 2 TIMES DAILY
Qty: 14 CAPSULE | Refills: 0 | Status: SHIPPED | OUTPATIENT
Start: 2022-08-19 | End: 2022-12-18 | Stop reason: SDUPTHER

## 2022-08-19 NOTE — TELEPHONE ENCOUNTER
She is c/o dysuria, I have her coming to give a specimen.  What can I call in, she is allergic to keflex and cefaclor

## 2022-09-07 ENCOUNTER — LAB VISIT (OUTPATIENT)
Dept: LAB | Facility: HOSPITAL | Age: 37
End: 2022-09-07
Attending: INTERNAL MEDICINE
Payer: MEDICAID

## 2022-09-07 ENCOUNTER — OFFICE VISIT (OUTPATIENT)
Dept: HEPATOLOGY | Facility: CLINIC | Age: 37
End: 2022-09-07
Payer: MEDICAID

## 2022-09-07 VITALS
BODY MASS INDEX: 34.6 KG/M2 | WEIGHT: 171.63 LBS | SYSTOLIC BLOOD PRESSURE: 114 MMHG | HEIGHT: 59 IN | HEART RATE: 82 BPM | DIASTOLIC BLOOD PRESSURE: 80 MMHG

## 2022-09-07 DIAGNOSIS — E66.9 OBESITY (BMI 30.0-34.9): ICD-10-CM

## 2022-09-07 DIAGNOSIS — K76.0 NAFLD (NONALCOHOLIC FATTY LIVER DISEASE): Primary | ICD-10-CM

## 2022-09-07 DIAGNOSIS — R74.8 ELEVATED LIVER ENZYMES: ICD-10-CM

## 2022-09-07 LAB
FERRITIN SERPL-MCNC: 40 NG/ML (ref 20–300)
IGA SERPL-MCNC: 247 MG/DL (ref 40–350)
IGG SERPL-MCNC: 1180 MG/DL (ref 650–1600)
IGM SERPL-MCNC: 106 MG/DL (ref 50–300)

## 2022-09-07 PROCEDURE — 99999 PR PBB SHADOW E&M-EST. PATIENT-LVL IV: CPT | Mod: PBBFAC,,, | Performed by: INTERNAL MEDICINE

## 2022-09-07 PROCEDURE — 99204 PR OFFICE/OUTPT VISIT, NEW, LEVL IV, 45-59 MIN: ICD-10-PCS | Mod: S$PBB,,, | Performed by: INTERNAL MEDICINE

## 2022-09-07 PROCEDURE — 86790 VIRUS ANTIBODY NOS: CPT | Performed by: INTERNAL MEDICINE

## 2022-09-07 PROCEDURE — 99204 OFFICE O/P NEW MOD 45 MIN: CPT | Mod: S$PBB,,, | Performed by: INTERNAL MEDICINE

## 2022-09-07 PROCEDURE — 3044F HG A1C LEVEL LT 7.0%: CPT | Mod: CPTII,,, | Performed by: INTERNAL MEDICINE

## 2022-09-07 PROCEDURE — 80321 ALCOHOLS BIOMARKERS 1OR 2: CPT | Performed by: INTERNAL MEDICINE

## 2022-09-07 PROCEDURE — 86038 ANTINUCLEAR ANTIBODIES: CPT | Performed by: INTERNAL MEDICINE

## 2022-09-07 PROCEDURE — 86706 HEP B SURFACE ANTIBODY: CPT | Performed by: INTERNAL MEDICINE

## 2022-09-07 PROCEDURE — 3044F PR MOST RECENT HEMOGLOBIN A1C LEVEL <7.0%: ICD-10-PCS | Mod: CPTII,,, | Performed by: INTERNAL MEDICINE

## 2022-09-07 PROCEDURE — 82103 ALPHA-1-ANTITRYPSIN TOTAL: CPT | Performed by: INTERNAL MEDICINE

## 2022-09-07 PROCEDURE — 99999 PR PBB SHADOW E&M-EST. PATIENT-LVL IV: ICD-10-PCS | Mod: PBBFAC,,, | Performed by: INTERNAL MEDICINE

## 2022-09-07 PROCEDURE — 86256 FLUORESCENT ANTIBODY TITER: CPT | Mod: 91 | Performed by: INTERNAL MEDICINE

## 2022-09-07 PROCEDURE — 3074F SYST BP LT 130 MM HG: CPT | Mod: CPTII,,, | Performed by: INTERNAL MEDICINE

## 2022-09-07 PROCEDURE — 99214 OFFICE O/P EST MOD 30 MIN: CPT | Mod: PBBFAC | Performed by: INTERNAL MEDICINE

## 2022-09-07 PROCEDURE — 82784 ASSAY IGA/IGD/IGG/IGM EACH: CPT | Mod: 59 | Performed by: INTERNAL MEDICINE

## 2022-09-07 PROCEDURE — 3079F DIAST BP 80-89 MM HG: CPT | Mod: CPTII,,, | Performed by: INTERNAL MEDICINE

## 2022-09-07 PROCEDURE — 3008F PR BODY MASS INDEX (BMI) DOCUMENTED: ICD-10-PCS | Mod: CPTII,,, | Performed by: INTERNAL MEDICINE

## 2022-09-07 PROCEDURE — 87340 HEPATITIS B SURFACE AG IA: CPT | Performed by: INTERNAL MEDICINE

## 2022-09-07 PROCEDURE — 3008F BODY MASS INDEX DOCD: CPT | Mod: CPTII,,, | Performed by: INTERNAL MEDICINE

## 2022-09-07 PROCEDURE — 86235 NUCLEAR ANTIGEN ANTIBODY: CPT | Performed by: INTERNAL MEDICINE

## 2022-09-07 PROCEDURE — 3079F PR MOST RECENT DIASTOLIC BLOOD PRESSURE 80-89 MM HG: ICD-10-PCS | Mod: CPTII,,, | Performed by: INTERNAL MEDICINE

## 2022-09-07 PROCEDURE — 86803 HEPATITIS C AB TEST: CPT | Performed by: INTERNAL MEDICINE

## 2022-09-07 PROCEDURE — 82728 ASSAY OF FERRITIN: CPT | Performed by: INTERNAL MEDICINE

## 2022-09-07 PROCEDURE — 86704 HEP B CORE ANTIBODY TOTAL: CPT | Performed by: INTERNAL MEDICINE

## 2022-09-07 PROCEDURE — 3074F PR MOST RECENT SYSTOLIC BLOOD PRESSURE < 130 MM HG: ICD-10-PCS | Mod: CPTII,,, | Performed by: INTERNAL MEDICINE

## 2022-09-07 PROCEDURE — 36415 COLL VENOUS BLD VENIPUNCTURE: CPT | Performed by: INTERNAL MEDICINE

## 2022-09-08 LAB
A1AT SERPL-MCNC: 296 MG/DL (ref 100–190)
ANA SER QL IF: NORMAL
HAV IGG SER QL IA: NORMAL
HBV CORE AB SERPL QL IA: NORMAL
HBV SURFACE AB SER-ACNC: <3 MIU/ML
HBV SURFACE AB SER-ACNC: NORMAL M[IU]/ML
HBV SURFACE AG SERPL QL IA: NORMAL
HCV AB SERPL QL IA: NORMAL
MITOCHONDRIA AB TITR SER IF: NORMAL {TITER}
SMOOTH MUSCLE AB TITR SER IF: NORMAL {TITER}

## 2022-09-11 LAB
PETH 16:0/18.1 (POPETH): <10 NG/ML
PETH 16:0/18.2 (PLPETH): <10 NG/ML

## 2022-09-15 ENCOUNTER — PATIENT MESSAGE (OUTPATIENT)
Dept: HEPATOLOGY | Facility: CLINIC | Age: 37
End: 2022-09-15
Payer: MEDICAID

## 2022-09-20 ENCOUNTER — PROCEDURE VISIT (OUTPATIENT)
Dept: HEPATOLOGY | Facility: CLINIC | Age: 37
End: 2022-09-20
Attending: INTERNAL MEDICINE
Payer: MEDICAID

## 2022-09-20 ENCOUNTER — HOSPITAL ENCOUNTER (OUTPATIENT)
Dept: RADIOLOGY | Facility: HOSPITAL | Age: 37
Discharge: HOME | End: 2022-09-20
Attending: INTERNAL MEDICINE
Payer: MEDICAID

## 2022-09-20 VITALS — BODY MASS INDEX: 34.18 KG/M2 | HEIGHT: 59 IN | WEIGHT: 169.56 LBS

## 2022-09-20 DIAGNOSIS — R74.8 ELEVATED LIVER ENZYMES: ICD-10-CM

## 2022-09-20 PROCEDURE — 91200 LIVER ELASTOGRAPHY: CPT | Mod: 26,S$PBB,, | Performed by: NURSE PRACTITIONER

## 2022-09-20 PROCEDURE — 76705 US ABDOMEN LIMITED: ICD-10-PCS | Mod: 26,,, | Performed by: RADIOLOGY

## 2022-09-20 PROCEDURE — 91200 PR LIVER ELASTOGRAPHY W/OUT IMAG W/INTERP & REPORT: ICD-10-PCS | Mod: 26,S$PBB,, | Performed by: NURSE PRACTITIONER

## 2022-09-20 PROCEDURE — 76705 ECHO EXAM OF ABDOMEN: CPT | Mod: TC

## 2022-09-20 PROCEDURE — 76705 ECHO EXAM OF ABDOMEN: CPT | Mod: 26,,, | Performed by: RADIOLOGY

## 2022-09-20 PROCEDURE — 91200 LIVER ELASTOGRAPHY: CPT | Mod: PBBFAC | Performed by: NURSE PRACTITIONER

## 2022-09-20 NOTE — PROGRESS NOTES
Patient presented in clinic today for fibroscan examination of their liver. Patient verbalized that they have fasted 4 hours prior to their examination. Patient denies being pregnant or having any active implantable metal devices; such as a pacemaker, defibrillator, or pump.     Sarahi Pinzon, WENDYN, RN  Nurse Navigator, Hepatology  [facility]

## 2022-09-21 NOTE — PROCEDURES
Fibroscan Procedure     Name: Addis Delvalle  Date of Procedure : 2022   :: CAITIE Gifford  Diagnosis: other    Probe: XL    Fibroscan readin.4 KPa    Fibrosis:F 0-1     CAP readin dB/m    Steatosis: :<S1       Interpretation:   No significant steatosis or fibrosis

## 2022-09-22 NOTE — PROGRESS NOTES
Subjective:     Addis Delvalle is here for evaluation of abnormal LFTs    History of Present Illness:  Addis Delvalle is a 37-year-old female with spina bifida with shunt in place.  No prior history of liver related diseases.  No liver related complaints    Duration of abnormality-2022  Medications/OTC/Herbal- no  ETOH- no  Metabolic issues-no  BMI- 34  Family Hx- no      Review of Systems   Constitutional:  Negative for fatigue, fever and unexpected weight change.   Gastrointestinal:  Negative for abdominal distention, abdominal pain, blood in stool, nausea and vomiting.   Musculoskeletal:  Negative for arthralgias and gait problem.   Skin:  Negative for pallor and rash.   Neurological:  Negative for dizziness.   Hematological:  Does not bruise/bleed easily.   Psychiatric/Behavioral:  Negative for confusion, hallucinations and sleep disturbance.      Objective:     Physical Exam  Vitals reviewed.   Constitutional:       Appearance: Normal appearance. She is obese.   Eyes:      General: No scleral icterus.  Cardiovascular:      Heart sounds: No murmur heard.  Pulmonary:      Effort: Pulmonary effort is normal.      Breath sounds: No wheezing, rhonchi or rales.   Abdominal:      General: Bowel sounds are normal. There is no distension.      Palpations: There is no mass.      Tenderness: There is no abdominal tenderness.   Musculoskeletal:         General: No tenderness.      Right lower leg: No edema.      Left lower leg: No edema.   Lymphadenopathy:      Cervical: No cervical adenopathy.   Skin:     Coloration: Skin is not jaundiced.      Findings: No bruising or rash.   Neurological:      Mental Status: She is alert and oriented to person, place, and time.      Coordination: Coordination normal.   Psychiatric:         Behavior: Behavior normal.         Thought Content: Thought content normal.       Computed MELD-Na score unavailable. Necessary lab results were not found in the last year.  Computed MELD  score unavailable. Necessary lab results were not found in the last year.    WBC   Date Value Ref Range Status   06/01/2022 7.21 3.90 - 12.70 K/uL Final     Hemoglobin   Date Value Ref Range Status   06/01/2022 11.2 (L) 12.0 - 16.0 g/dL Final     Hematocrit   Date Value Ref Range Status   06/01/2022 36.5 (L) 37.0 - 48.5 % Final     Platelets   Date Value Ref Range Status   06/01/2022 316 150 - 450 K/uL Final     BUN   Date Value Ref Range Status   06/22/2022 11 6 - 20 mg/dL Final     Creatinine   Date Value Ref Range Status   06/22/2022 0.9 0.5 - 1.4 mg/dL Final     Glucose   Date Value Ref Range Status   06/22/2022 97 70 - 110 mg/dL Final     Calcium   Date Value Ref Range Status   06/22/2022 9.7 8.7 - 10.5 mg/dL Final     Sodium   Date Value Ref Range Status   06/22/2022 140 136 - 145 mmol/L Final     Potassium   Date Value Ref Range Status   06/22/2022 4.6 3.5 - 5.1 mmol/L Final     Chloride   Date Value Ref Range Status   06/22/2022 108 95 - 110 mmol/L Final     Magnesium   Date Value Ref Range Status   01/10/2020 1.7 1.6 - 2.6 mg/dL Final     AST   Date Value Ref Range Status   06/22/2022 42 (H) 10 - 40 U/L Final     ALT   Date Value Ref Range Status   06/22/2022 70 (H) 10 - 44 U/L Final     Alkaline Phosphatase   Date Value Ref Range Status   06/22/2022 104 55 - 135 U/L Final     Total Bilirubin   Date Value Ref Range Status   06/22/2022 0.5 0.1 - 1.0 mg/dL Final     Comment:     For infants and newborns, interpretation of results should be based  on gestational age, weight and in agreement with clinical  observations.    Premature Infant recommended reference ranges:  Up to 24 hours.............<8.0 mg/dL  Up to 48 hours............<12.0 mg/dL  3-5 days..................<15.0 mg/dL  6-29 days.................<15.0 mg/dL       Albumin   Date Value Ref Range Status   06/22/2022 3.7 3.5 - 5.2 g/dL Final     No results found for: INR      Assessment/Plan:     1.Abnormal LFTs:  Will initiate work up to rule out  infectious/autoimmune/genetic disorders of the liver  Suspect fatty liver disease, will obtain US and assess fibrosis with VCTE.    2.Obesity: Class-2  Discussed dietary recommendations- Low calorie, low carbohydrate, high protein diet with goal of loosing >3-5% to improve steatosis and >7-10% to improve histological changes if present      I have reviewed existing labs, imaging, procedures. Educated patient about disease process, prognosis. Ordered required labs, images and discussed treatment plan.     Halie Salas MD  Transplant Hepatologist  Dept of Hepatology, [place] [facility]

## 2022-09-23 ENCOUNTER — TELEPHONE (OUTPATIENT)
Dept: HEPATOLOGY | Facility: CLINIC | Age: 37
End: 2022-09-23
Payer: MEDICAID

## 2022-09-23 ENCOUNTER — PATIENT MESSAGE (OUTPATIENT)
Dept: HEPATOLOGY | Facility: CLINIC | Age: 37
End: 2022-09-23
Payer: MEDICAID

## 2022-09-23 NOTE — TELEPHONE ENCOUNTER
----- Message from CAITIE Massey sent at 9/21/2022  7:01 AM CDT -----  Interpretation:   No significant steatosis or fibrosis

## 2022-12-16 ENCOUNTER — TELEPHONE (OUTPATIENT)
Dept: FAMILY MEDICINE | Facility: CLINIC | Age: 37
End: 2022-12-16
Payer: MEDICAID

## 2022-12-16 ENCOUNTER — PATIENT MESSAGE (OUTPATIENT)
Dept: FAMILY MEDICINE | Facility: CLINIC | Age: 37
End: 2022-12-16
Payer: MEDICAID

## 2022-12-16 DIAGNOSIS — R30.0 DYSURIA: Primary | ICD-10-CM

## 2022-12-16 RX ORDER — NITROFURANTOIN 25; 75 MG/1; MG/1
100 CAPSULE ORAL 2 TIMES DAILY
Qty: 14 CAPSULE | Refills: 0 | Status: ON HOLD | OUTPATIENT
Start: 2022-12-16 | End: 2022-12-22 | Stop reason: HOSPADM

## 2022-12-16 NOTE — TELEPHONE ENCOUNTER
----- Message from Jovi Brock sent at 12/16/2022  9:53 AM CST -----  Contact: patient  Type:  Same Day Appointment Request    Caller is requesting a same day appointment.  Caller declined first available appointment listed below.    Name of Caller:Addis Delvalle   When is the first available appointment? 2023  Symptoms: possible bladder infection   Best Call Back Number:[phone]  Additional Information:

## 2022-12-16 NOTE — TELEPHONE ENCOUNTER
Attempted to call patient, there was no answer, message left to call the clinic back at their convenience.

## 2022-12-16 NOTE — TELEPHONE ENCOUNTER
She is c/o dysuria , will come give a specimen   She is allergic to keflex and cefaclor   What to call in?

## 2022-12-18 ENCOUNTER — HOSPITAL ENCOUNTER (INPATIENT)
Facility: HOSPITAL | Age: 37
LOS: 4 days | Discharge: HOME | DRG: 698 | End: 2022-12-22
Attending: EMERGENCY MEDICINE | Admitting: INTERNAL MEDICINE
Payer: MEDICAID

## 2022-12-18 DIAGNOSIS — Z16.12 ESBL (EXTENDED SPECTRUM BETA-LACTAMASE) PRODUCING BACTERIA INFECTION: ICD-10-CM

## 2022-12-18 DIAGNOSIS — R65.20 SEVERE SEPSIS: ICD-10-CM

## 2022-12-18 DIAGNOSIS — R00.0 TACHYCARDIA: ICD-10-CM

## 2022-12-18 DIAGNOSIS — A49.9 ESBL (EXTENDED SPECTRUM BETA-LACTAMASE) PRODUCING BACTERIA INFECTION: ICD-10-CM

## 2022-12-18 DIAGNOSIS — A41.9 SEVERE SEPSIS: ICD-10-CM

## 2022-12-18 DIAGNOSIS — N20.0 STAGHORN CALCULUS: ICD-10-CM

## 2022-12-18 DIAGNOSIS — A41.9 SEPSIS, DUE TO UNSPECIFIED ORGANISM, UNSPECIFIED WHETHER ACUTE ORGAN DYSFUNCTION PRESENT: ICD-10-CM

## 2022-12-18 DIAGNOSIS — N12 PYELONEPHRITIS: Primary | ICD-10-CM

## 2022-12-18 LAB
ALBUMIN SERPL BCP-MCNC: 2.7 G/DL (ref 3.5–5.2)
ALP SERPL-CCNC: 88 U/L (ref 55–135)
ALT SERPL W/O P-5'-P-CCNC: 9 U/L (ref 10–44)
AMPHET+METHAMPHET UR QL: NEGATIVE
ANION GAP SERPL CALC-SCNC: 14 MMOL/L (ref 8–16)
AST SERPL-CCNC: 9 U/L (ref 10–40)
B-HCG UR QL: NEGATIVE
BACTERIA #/AREA URNS HPF: ABNORMAL /HPF
BARBITURATES UR QL SCN>200 NG/ML: NEGATIVE
BASOPHILS # BLD AUTO: 0.04 K/UL (ref 0–0.2)
BASOPHILS NFR BLD: 0.2 % (ref 0–1.9)
BENZODIAZ UR QL SCN>200 NG/ML: NEGATIVE
BILIRUB SERPL-MCNC: 0.8 MG/DL (ref 0.1–1)
BILIRUB UR QL STRIP: NEGATIVE
BUN SERPL-MCNC: 14 MG/DL (ref 6–20)
BZE UR QL SCN: NEGATIVE
CALCIUM SERPL-MCNC: 9.7 MG/DL (ref 8.7–10.5)
CANNABINOIDS UR QL SCN: NEGATIVE
CHLORIDE SERPL-SCNC: 104 MMOL/L (ref 95–110)
CLARITY UR: ABNORMAL
CO2 SERPL-SCNC: 19 MMOL/L (ref 23–29)
COLOR UR: YELLOW
CREAT SERPL-MCNC: 1.3 MG/DL (ref 0.5–1.4)
CREAT UR-MCNC: 98.8 MG/DL (ref 15–325)
DIFFERENTIAL METHOD: ABNORMAL
EOSINOPHIL # BLD AUTO: 0.1 K/UL (ref 0–0.5)
EOSINOPHIL NFR BLD: 0.4 % (ref 0–8)
ERYTHROCYTE [DISTWIDTH] IN BLOOD BY AUTOMATED COUNT: 13.9 % (ref 11.5–14.5)
EST. GFR  (NO RACE VARIABLE): 54 ML/MIN/1.73 M^2
GLUCOSE SERPL-MCNC: 130 MG/DL (ref 70–110)
GLUCOSE UR QL STRIP: NEGATIVE
HCT VFR BLD AUTO: 34.8 % (ref 37–48.5)
HGB BLD-MCNC: 10.5 G/DL (ref 12–16)
HGB UR QL STRIP: ABNORMAL
HIV 1+2 AB+HIV1 P24 AG SERPL QL IA: NEGATIVE
HYALINE CASTS #/AREA URNS LPF: 0 /LPF
IMM GRANULOCYTES # BLD AUTO: 0.12 K/UL (ref 0–0.04)
IMM GRANULOCYTES NFR BLD AUTO: 0.6 % (ref 0–0.5)
KETONES UR QL STRIP: NEGATIVE
LACTATE SERPL-SCNC: 1.3 MMOL/L (ref 0.5–2.2)
LEUKOCYTE ESTERASE UR QL STRIP: ABNORMAL
LYMPHOCYTES # BLD AUTO: 2 K/UL (ref 1–4.8)
LYMPHOCYTES NFR BLD: 9.8 % (ref 18–48)
MCH RBC QN AUTO: 26.7 PG (ref 27–31)
MCHC RBC AUTO-ENTMCNC: 30.2 G/DL (ref 32–36)
MCV RBC AUTO: 89 FL (ref 82–98)
METHADONE UR QL SCN>300 NG/ML: NEGATIVE
MICROSCOPIC COMMENT: ABNORMAL
MONOCYTES # BLD AUTO: 1 K/UL (ref 0.3–1)
MONOCYTES NFR BLD: 5 % (ref 4–15)
NEUTROPHILS # BLD AUTO: 17.3 K/UL (ref 1.8–7.7)
NEUTROPHILS NFR BLD: 84 % (ref 38–73)
NITRITE UR QL STRIP: POSITIVE
NRBC BLD-RTO: 0 /100 WBC
OPIATES UR QL SCN: NEGATIVE
PCP UR QL SCN>25 NG/ML: NEGATIVE
PH UR STRIP: 6 [PH] (ref 5–8)
PLATELET # BLD AUTO: 377 K/UL (ref 150–450)
PMV BLD AUTO: 9.3 FL (ref 9.2–12.9)
POTASSIUM SERPL-SCNC: 4.2 MMOL/L (ref 3.5–5.1)
PROCALCITONIN SERPL IA-MCNC: 2.53 NG/ML
PROT SERPL-MCNC: 8.2 G/DL (ref 6–8.4)
PROT UR QL STRIP: ABNORMAL
RBC # BLD AUTO: 3.93 M/UL (ref 4–5.4)
RBC #/AREA URNS HPF: 37 /HPF (ref 0–4)
SODIUM SERPL-SCNC: 137 MMOL/L (ref 136–145)
SP GR UR STRIP: 1.01 (ref 1–1.03)
SQUAMOUS #/AREA URNS HPF: 2 /HPF
TOXICOLOGY INFORMATION: NORMAL
UNIDENT CRYS URNS QL MICRO: ABNORMAL
URN SPEC COLLECT METH UR: ABNORMAL
UROBILINOGEN UR STRIP-ACNC: NEGATIVE EU/DL
WBC # BLD AUTO: 20.54 K/UL (ref 3.9–12.7)
WBC #/AREA URNS HPF: >100 /HPF (ref 0–5)
WBC CLUMPS URNS QL MICRO: ABNORMAL

## 2022-12-18 PROCEDURE — 96374 THER/PROPH/DIAG INJ IV PUSH: CPT

## 2022-12-18 PROCEDURE — 87086 URINE CULTURE/COLONY COUNT: CPT | Performed by: EMERGENCY MEDICINE

## 2022-12-18 PROCEDURE — 99223 PR INITIAL HOSPITAL CARE,LEVL III: ICD-10-PCS | Mod: ,,, | Performed by: UROLOGY

## 2022-12-18 PROCEDURE — 87389 HIV-1 AG W/HIV-1&-2 AB AG IA: CPT | Performed by: EMERGENCY MEDICINE

## 2022-12-18 PROCEDURE — 99291 CRITICAL CARE FIRST HOUR: CPT | Mod: 25

## 2022-12-18 PROCEDURE — 87077 CULTURE AEROBIC IDENTIFY: CPT | Mod: 59 | Performed by: EMERGENCY MEDICINE

## 2022-12-18 PROCEDURE — 80053 COMPREHEN METABOLIC PANEL: CPT | Performed by: EMERGENCY MEDICINE

## 2022-12-18 PROCEDURE — 84145 PROCALCITONIN (PCT): CPT | Performed by: EMERGENCY MEDICINE

## 2022-12-18 PROCEDURE — 87088 URINE BACTERIA CULTURE: CPT | Performed by: EMERGENCY MEDICINE

## 2022-12-18 PROCEDURE — 85025 COMPLETE CBC W/AUTO DIFF WBC: CPT | Performed by: EMERGENCY MEDICINE

## 2022-12-18 PROCEDURE — 96361 HYDRATE IV INFUSION ADD-ON: CPT

## 2022-12-18 PROCEDURE — 81025 URINE PREGNANCY TEST: CPT | Performed by: EMERGENCY MEDICINE

## 2022-12-18 PROCEDURE — 93005 ELECTROCARDIOGRAM TRACING: CPT

## 2022-12-18 PROCEDURE — 87186 SC STD MICRODIL/AGAR DIL: CPT | Mod: 59 | Performed by: EMERGENCY MEDICINE

## 2022-12-18 PROCEDURE — 93010 EKG 12-LEAD: ICD-10-PCS | Mod: ,,, | Performed by: INTERNAL MEDICINE

## 2022-12-18 PROCEDURE — 87040 BLOOD CULTURE FOR BACTERIA: CPT | Mod: 59 | Performed by: EMERGENCY MEDICINE

## 2022-12-18 PROCEDURE — 93010 ELECTROCARDIOGRAM REPORT: CPT | Mod: ,,, | Performed by: INTERNAL MEDICINE

## 2022-12-18 PROCEDURE — 63600175 PHARM REV CODE 636 W HCPCS: Performed by: EMERGENCY MEDICINE

## 2022-12-18 PROCEDURE — 25000003 PHARM REV CODE 250: Performed by: STUDENT IN AN ORGANIZED HEALTH CARE EDUCATION/TRAINING PROGRAM

## 2022-12-18 PROCEDURE — 80307 DRUG TEST PRSMV CHEM ANLYZR: CPT | Performed by: EMERGENCY MEDICINE

## 2022-12-18 PROCEDURE — 25000003 PHARM REV CODE 250: Performed by: EMERGENCY MEDICINE

## 2022-12-18 PROCEDURE — 11000001 HC ACUTE MED/SURG PRIVATE ROOM

## 2022-12-18 PROCEDURE — 83605 ASSAY OF LACTIC ACID: CPT | Performed by: EMERGENCY MEDICINE

## 2022-12-18 PROCEDURE — 99223 1ST HOSP IP/OBS HIGH 75: CPT | Mod: ,,, | Performed by: UROLOGY

## 2022-12-18 PROCEDURE — 81000 URINALYSIS NONAUTO W/SCOPE: CPT | Mod: 59 | Performed by: EMERGENCY MEDICINE

## 2022-12-18 RX ORDER — SODIUM CHLORIDE 0.9 % (FLUSH) 0.9 %
10 SYRINGE (ML) INJECTION
Status: DISCONTINUED | OUTPATIENT
Start: 2022-12-18 | End: 2022-12-22 | Stop reason: HOSPADM

## 2022-12-18 RX ORDER — ACETAMINOPHEN 325 MG/1
650 TABLET ORAL EVERY 4 HOURS PRN
Status: DISCONTINUED | OUTPATIENT
Start: 2022-12-18 | End: 2022-12-22 | Stop reason: HOSPADM

## 2022-12-18 RX ORDER — MEROPENEM AND SODIUM CHLORIDE 500 MG/50ML
500 INJECTION, SOLUTION INTRAVENOUS
Status: DISCONTINUED | OUTPATIENT
Start: 2022-12-18 | End: 2022-12-21

## 2022-12-18 RX ORDER — KETOROLAC TROMETHAMINE 30 MG/ML
30 INJECTION, SOLUTION INTRAMUSCULAR; INTRAVENOUS
Status: COMPLETED | OUTPATIENT
Start: 2022-12-18 | End: 2022-12-18

## 2022-12-18 RX ORDER — ONDANSETRON 2 MG/ML
4 INJECTION INTRAMUSCULAR; INTRAVENOUS EVERY 8 HOURS PRN
Status: DISCONTINUED | OUTPATIENT
Start: 2022-12-18 | End: 2022-12-22 | Stop reason: HOSPADM

## 2022-12-18 RX ORDER — GRANULES FOR ORAL 3 G/1
3 POWDER ORAL ONCE
Status: ON HOLD | COMMUNITY
Start: 2022-12-16 | End: 2022-12-22 | Stop reason: HOSPADM

## 2022-12-18 RX ADMIN — MEROPENEM AND SODIUM CHLORIDE 500 MG: 500 INJECTION, SOLUTION INTRAVENOUS at 02:12

## 2022-12-18 RX ADMIN — KETOROLAC TROMETHAMINE 30 MG: 30 INJECTION, SOLUTION INTRAMUSCULAR at 12:12

## 2022-12-18 RX ADMIN — MEROPENEM AND SODIUM CHLORIDE 500 MG: 500 INJECTION, SOLUTION INTRAVENOUS at 08:12

## 2022-12-18 RX ADMIN — SODIUM CHLORIDE 1000 ML: 9 INJECTION, SOLUTION INTRAVENOUS at 12:12

## 2022-12-18 RX ADMIN — ACETAMINOPHEN 650 MG: 325 TABLET ORAL at 08:12

## 2022-12-18 NOTE — PROGRESS NOTES
Pharmacist Renal Dose Adjustment Note    Addis Delvalle is a 37 y.o. female being treated with the medication meropenem.     Patient Data:    Vital Signs (Most Recent):  Temp: 99.1 °F (37.3 °C) (12/18/22 1119)  Pulse: (!) 111 (12/18/22 1300)  Resp: (!) 22 (12/18/22 1300)  BP: 130/76 (12/18/22 1300)  SpO2: 98 % (12/18/22 1300)   Vital Signs (72h Range):  Temp:  [99.1 °F (37.3 °C)]   Pulse:  [111-134]   Resp:  [20-22]   BP: (130-144)/(76)   SpO2:  [98 %]      Recent Labs   Lab 12/18/22  1213   CREATININE 1.3     Serum creatinine: 1.3 mg/dL 12/18/22 1213  Estimated creatinine clearance: 56.2 mL/min    Meropenem 1 gram IV every 12 hours will be changed to meropenem 500 mg IV every 6 hours per [place] meropenem restriction criteria/renal dose protocol for CrCl > 50 ml/min.     Thank you,  Pharmacist's Name: Jeremias Thomas

## 2022-12-18 NOTE — ED PROVIDER NOTES
SCRIBE #1 NOTE: I, Patricia You, am scribing for, and in the presence of, Zoie Hdz MD. I have scribed the entire note.      History      Chief Complaint   Patient presents with    Flank Pain     Right-sided flank pain began Friday, patient only has right kidney, denies dysuria, on medication for UTI currently       Review of patient's allergies indicates:   Allergen Reactions    Cefaclor     Cephalexin     Meperidine      vomiting    Promethazine         HPI   HPI    12/18/2022, 11:42 AM   History obtained from the patient      History of Present Illness: Addis Delvalle is a 37 y.o. female patient who presents to the Emergency Department for mid abdominal pain which onset gradually two days ago. Pt states she visited an outpatient clinic and was told she had a UTI and was given antibiotics. Pt also notes that she was told she had kidney stones 3 years ago but never received surgery for it and has not had problems with it since4. Symptoms are constant and moderate in severity. No mitigating or exacerbating factors reported. No associated sxs noted. Patient denies any fever, chills, n/v, dysuria, weakness, and all other sxs at this time. No further complaints or concerns at this time.        Arrival mode: Personal vehicle      PCP: Madie Horn MD       Past Medical History:  Past Medical History:   Diagnosis Date    Spina bifida     Spina bifida        Past Surgical History:  Past Surgical History:   Procedure Laterality Date    BLADDER AUGMENTATION      CSF SHUNT      INSERTION OF SUPRAPUBIC CATHETER  1990         Family History:  Family History   Problem Relation Age of Onset    Hyperlipidemia Mother     Hypertension Mother     Diabetes Father     Hyperlipidemia Father     Diabetes Paternal Grandmother        Social History:  Social History     Tobacco Use    Smoking status: Never    Smokeless tobacco: Never   Substance and Sexual Activity    Alcohol use: Yes     Comment: Occaisonally    Drug use:  Never    Sexual activity: Never       ROS   Review of Systems   Constitutional:  Negative for chills and fever.   HENT:  Negative for sore throat.    Respiratory:  Negative for shortness of breath.    Cardiovascular:  Negative for chest pain.   Gastrointestinal:  Positive for abdominal pain (mid abdominal pain). Negative for nausea and vomiting.   Genitourinary:  Negative for dysuria.   Musculoskeletal:  Negative for back pain.   Skin:  Negative for rash.   Neurological:  Negative for weakness.   Hematological:  Does not bruise/bleed easily.   All other systems reviewed and are negative.    Physical Exam      Initial Vitals [12/18/22 1119]   BP Pulse Resp Temp SpO2   (!) 144/76 (!) 134 20 99.1 °F (37.3 °C) 98 %      MAP       --          Physical Exam  Nursing Notes and Vital Signs Reviewed.  Constitutional: Patient is in mild distress. Well-developed and well-nourished.  Head: Atraumatic. Normocephalic.  Eyes: PERRL. EOM intact. Conjunctivae are not pale. No scleral icterus.  ENT: Mucous membranes are moist. Oropharynx is clear and symmetric.    Neck: Supple. Full ROM. No lymphadenopathy.  Cardiovascular: Regular rate. Regular rhythm. No murmurs, rubs, or gallops. Distal pulses are 2+ and symmetric.  Pulmonary/Chest: No respiratory distress. Clear to auscultation bilaterally. No wheezing or rales.  Abdominal: Soft and non-distended. No rebound, guarding, or rigidity. Good bowel sounds.  Genitourinary: No CVA tenderness  Musculoskeletal: Moves all extremities. No obvious deformities. No edema. No calf tenderness.  Skin: Warm and dry.  Neurological:  Alert, awake, and appropriate.  Normal speech.  No acute focal neurological deficits are appreciated.  Psychiatric: Normal affect. Good eye contact. Appropriate in content.    ED Course    Critical Care    Date/Time: 12/18/2022 2:44 PM  Performed by: Zoie Hdz MD  Authorized by: Juliann Narayan MD   Direct patient critical care time: 25 minutes  Additional history  critical care time: 5 minutes  Ordering / reviewing critical care time: 5 minutes  Documentation critical care time: 5 minutes  Consulting other physicians critical care time: 8 minutes  Total critical care time (exclusive of procedural time) : 48 minutes  Critical care was necessary to treat or prevent imminent or life-threatening deterioration of the following conditions: sepsis.  Critical care was time spent personally by me on the following activities: blood draw for specimens, development of treatment plan with patient or surrogate, discussions with consultants, discussions with primary provider, interpretation of cardiac output measurements, evaluation of patient's response to treatment, examination of patient, obtaining history from patient or surrogate, ordering and performing treatments and interventions, ordering and review of laboratory studies, ordering and review of radiographic studies, pulse oximetry, re-evaluation of patient's condition and review of old charts.      ED Vital Signs:  Vitals:    12/18/22 1119 12/18/22 1300   BP: (!) 144/76 130/76   Pulse: (!) 134 (!) 111   Resp: 20 (!) 22   Temp: 99.1 °F (37.3 °C)    TempSrc: Oral    SpO2: 98% 98%   Weight: 77 kg (169 lb 13.8 oz)        Abnormal Lab Results:  Labs Reviewed   CBC W/ AUTO DIFFERENTIAL - Abnormal; Notable for the following components:       Result Value    WBC 20.54 (*)     RBC 3.93 (*)     Hemoglobin 10.5 (*)     Hematocrit 34.8 (*)     MCH 26.7 (*)     MCHC 30.2 (*)     Immature Granulocytes 0.6 (*)     Gran # (ANC) 17.3 (*)     Immature Grans (Abs) 0.12 (*)     Gran % 84.0 (*)     Lymph % 9.8 (*)     All other components within normal limits   COMPREHENSIVE METABOLIC PANEL - Abnormal; Notable for the following components:    CO2 19 (*)     Glucose 130 (*)     Albumin 2.7 (*)     AST 9 (*)     ALT 9 (*)     eGFR 54 (*)     All other components within normal limits   PROCALCITONIN - Abnormal; Notable for the following components:     Procalcitonin 2.53 (*)     All other components within normal limits   URINALYSIS, REFLEX TO URINE CULTURE - Abnormal; Notable for the following components:    Appearance, UA Cloudy (*)     Protein, UA 2+ (*)     Occult Blood UA 2+ (*)     Nitrite, UA Positive (*)     Leukocytes, UA 3+ (*)     All other components within normal limits    Narrative:     Specimen Source->Urine   URINALYSIS MICROSCOPIC - Abnormal; Notable for the following components:    RBC, UA 37 (*)     WBC, UA >100 (*)     WBC Clumps, UA Many (*)     Bacteria Many (*)     All other components within normal limits    Narrative:     Specimen Source->Urine   CULTURE, BLOOD   CULTURE, BLOOD   CULTURE, URINE   HIV 1 / 2 ANTIBODY    Narrative:     Release to patient->Immediate   LACTIC ACID, PLASMA   DRUG SCREEN PANEL, URINE EMERGENCY    Narrative:     Specimen Source->Urine   PREGNANCY TEST, URINE RAPID    Narrative:     Specimen Source->Urine        All Lab Results:  Results for orders placed or performed during the hospital encounter of 12/18/22   HIV 1/2 Ag/Ab (4th Gen)   Result Value Ref Range    HIV 1/2 Ag/Ab Negative Negative   CBC auto differential   Result Value Ref Range    WBC 20.54 (H) 3.90 - 12.70 K/uL    RBC 3.93 (L) 4.00 - 5.40 M/uL    Hemoglobin 10.5 (L) 12.0 - 16.0 g/dL    Hematocrit 34.8 (L) 37.0 - 48.5 %    MCV 89 82 - 98 fL    MCH 26.7 (L) 27.0 - 31.0 pg    MCHC 30.2 (L) 32.0 - 36.0 g/dL    RDW 13.9 11.5 - 14.5 %    Platelets 377 150 - 450 K/uL    MPV 9.3 9.2 - 12.9 fL    Immature Granulocytes 0.6 (H) 0.0 - 0.5 %    Gran # (ANC) 17.3 (H) 1.8 - 7.7 K/uL    Immature Grans (Abs) 0.12 (H) 0.00 - 0.04 K/uL    Lymph # 2.0 1.0 - 4.8 K/uL    Mono # 1.0 0.3 - 1.0 K/uL    Eos # 0.1 0.0 - 0.5 K/uL    Baso # 0.04 0.00 - 0.20 K/uL    nRBC 0 0 /100 WBC    Gran % 84.0 (H) 38.0 - 73.0 %    Lymph % 9.8 (L) 18.0 - 48.0 %    Mono % 5.0 4.0 - 15.0 %    Eosinophil % 0.4 0.0 - 8.0 %    Basophil % 0.2 0.0 - 1.9 %    Differential Method Automated     Comprehensive metabolic panel   Result Value Ref Range    Sodium 137 136 - 145 mmol/L    Potassium 4.2 3.5 - 5.1 mmol/L    Chloride 104 95 - 110 mmol/L    CO2 19 (L) 23 - 29 mmol/L    Glucose 130 (H) 70 - 110 mg/dL    BUN 14 6 - 20 mg/dL    Creatinine 1.3 0.5 - 1.4 mg/dL    Calcium 9.7 8.7 - 10.5 mg/dL    Total Protein 8.2 6.0 - 8.4 g/dL    Albumin 2.7 (L) 3.5 - 5.2 g/dL    Total Bilirubin 0.8 0.1 - 1.0 mg/dL    Alkaline Phosphatase 88 55 - 135 U/L    AST 9 (L) 10 - 40 U/L    ALT 9 (L) 10 - 44 U/L    Anion Gap 14 8 - 16 mmol/L    eGFR 54 (A) >60 mL/min/1.73 m^2   Procalcitonin   Result Value Ref Range    Procalcitonin 2.53 (H) <0.25 ng/mL   Lactic acid, plasma   Result Value Ref Range    Lactate (Lactic Acid) 1.3 0.5 - 2.2 mmol/L   Urinalysis, Reflex to Urine Culture Urine, Catheterized    Specimen: Urine   Result Value Ref Range    Specimen UA Urine, Catheterized     Color, UA Yellow Yellow, Straw, [place]    Appearance, UA Cloudy (A) Clear    pH, UA 6.0 5.0 - 8.0    Specific Gravity, UA 1.010 1.005 - 1.030    Protein, UA 2+ (A) Negative    Glucose, UA Negative Negative    Ketones, UA Negative Negative    Bilirubin (UA) Negative Negative    Occult Blood UA 2+ (A) Negative    Nitrite, UA Positive (A) Negative    Urobilinogen, UA Negative <2.0 EU/dL    Leukocytes, UA 3+ (A) Negative   Drug screen panel, emergency   Result Value Ref Range    Benzodiazepines Negative Negative    Methadone metabolites Negative Negative    Cocaine (Metab.) Negative Negative    Opiate Scrn, Ur Negative Negative    Barbiturate Screen, Ur Negative Negative    Amphetamine Screen, Ur Negative Negative    THC Negative Negative    Phencyclidine Negative Negative    Creatinine, Urine 98.8 15.0 - 325.0 mg/dL    Toxicology Information SEE COMMENT    Pregnancy, urine rapid (UPT)   Result Value Ref Range    Preg Test, Ur Negative    Urinalysis Microscopic   Result Value Ref Range    RBC, UA 37 (H) 0 - 4 /hpf    WBC, UA >100 (H) 0 - 5 /hpf    WBC  Clumps, UA Many (A) None-Rare    Bacteria Many (A) None-Occ /hpf    Squam Epithel, UA 2 /hpf    Hyaline Casts, UA 0 0-1/lpf /lpf    Unclass Mei UA Occasional None-Moderate    Microscopic Comment SEE COMMENT         Imaging Results:  Imaging Results              CT Renal Stone Study ABD Pelvis WO (Final result)  Result time 12/18/22 14:15:11      Final result by Lashanda Fisher MD (12/18/22 14:15:11)                   Impression:      New right basilar atelectasis or airspace opacity    Right renal large staghorn calculi and mild hydroureteronephrosis with new mild perinephric stranding and increasing shoddy lymph nodes    All CT scans at this facility use dose modulation, iterative reconstruction, and/or weight based dosing when appropriate to reduce radiation dose to as low as reasonable achievable.      Electronically signed by: Lashanda Fisher  Date:    12/18/2022  Time:    14:15               Narrative:    EXAMINATION:  CT RENAL STONE STUDY ABD PELVIS WO    CLINICAL HISTORY:  Flank pain, kidney stone suspected;    TECHNIQUE:  Unenhanced imaging renal stone protocol with 2D reformations    Automated exposure control technique utilized for diminishing radiation exposure dose    COMPARISON:  May 2021    FINDINGS:  Unenhanced liver and spleen stable size    Gallbladder mildly distended indistinct wall    Unenhanced pancreas stable    Again left kidney markedly atrophic.  Right renal large staghorn calculi is re-identified.  Right hydro ureteral nephrosis and mild urothelial prominence diffusely similar to prior exam.    Urinary bladder morphology and mural thickening similar to prior exam given differences in distension.    No obstructive or overt inflammatory bowel findings.  Postsurgical change re-identified.  Mural lipid deposition terminal ileum and proximal colon similar to previous exam which can be seen in chronic inflammatory states    Shotty retroperitoneal lymph nodes may be slightly more prominent  as visualized unenhanced imaging    Right basilar atelectasis or airspace opacity new since prior exam    Peritoneal catheter similar position without atypia    Small fat containing ventral abdominal wall hernia re-identified similar                                          The EKG was ordered, reviewed, and independently interpreted by the ED provider.  Interpretation time: 12:55  Rate: 108 BPM  Rhythm: sinus tachycardia  Interpretation: Nonspecific T wave abnormality. No STEMI.    ED Discussion   14:40 Case discussed with Dr. Guillory with urology who recommends admit to [facility] medicine for IV antibiotics, no surgical plans as of now due to patient being septic.     2:39 PM - Re-evaluation:  Discussed test results, shared treatment plan, and the need for admission with patient and family. They understand and agree to the plan as discussed. Answered questions at this time.     2:39 PM - CONSULT: Dr. Hdz discussed the case with Dr. Holbrook. Agrees with current management. Recommends   Admitting Service: Hospital Medicine   Admitting Physician: Dr. Narayan   Admit to [facility]           ED Medication(s):  Medications   meropenem-0.9% sodium chloride 500 mg/50 mL IVPB (has no administration in time range)   ketorolac injection 30 mg (30 mg Intravenous Given 12/18/22 1212)   sodium chloride 0.9% bolus 1,000 mL 1,000 mL (1,000 mLs Intravenous New Bag 12/18/22 1211)   sodium chloride 0.9% bolus 1,000 mL 1,000 mL (1,000 mLs Intravenous New Bag 12/18/22 1249)             Medical Decision Making    Medical Decision Making:   Clinical Tests:   Lab Tests: Ordered and Reviewed  Radiological Study: Ordered and Reviewed  Medical Tests: Ordered and Reviewed         Scribe Attestation:   Scribe #1: I performed the above scribed service and the documentation accurately describes the services I performed. I attest to the accuracy of the note.    Attending:   Physician Attestation Statement for Scribe #1: I, Zoie Hdz MD,  personally performed the services described in this documentation, as scribed by Patricia You, in my presence, and it is both accurate and complete.          Clinical Impression       ICD-10-CM ICD-9-CM   1. Pyelonephritis  N12 590.80   2. Tachycardia  R00.0 785.0   3. Sepsis, due to unspecified organism, unspecified whether acute organ dysfunction present  A41.9 038.9     995.91   4. Staghorn calculus  N20.0 592.0       Disposition:   Disposition: Admitted  Condition: Tash Hdz MD  12/18/22 3613

## 2022-12-18 NOTE — CONSULTS
"Chief Complaint:  Renal stones, pyelonephritis    HPI:   12/18/2022 - patient returns today to the ER for evaluation of right-sided flank pain, patient began having pain Friday evening, no fevers that she can recall, she thought it was a UTI so she presented here for evaluation, urine is nitrite positive, CT was evaluated which showed stability of her renal stones, patient has a functional solitary kidney, normal renal function, denies any gross hematuria or decreased urination    04/15/2021 - 34 yo female here for evaluation of neurogenic bladder. She has a h/o spina bifida and neurogenic bladder, currently managed with bladder augment and continent catheterizable channel, done during early childhood. Caths Q4-5 hours. Hasn't seen a urologist in a while. Thinks she has a UTI. Urine is "strong". No pain. No fever. No gross hematuria. Getting UTIs at least once a month. Symptoms typically include strong smelling urine and feelings of dehydration (dry mouth). She was seeing Elana Edwards in the past, last a few years ago. Reports a cystoscopy was done. States that she was told her bladder was "tilted", and doesn't drain all the way. She was on oxybutynin for a year. Has been off for 5-6 years. Can't remember why she stopped or if she just ran out. Sometimes has incontinence per urethra. Really only if she doesn't cath as often as she is supposed to. Doesn't have a bowel management regimen.     PMH:  Past Medical History:   Diagnosis Date    Spina bifida     Spina bifida        PSH:  Past Surgical History:   Procedure Laterality Date    BLADDER AUGMENTATION      CSF SHUNT      INSERTION OF SUPRAPUBIC CATHETER  1990       Family History:  Family History   Problem Relation Age of Onset    Hyperlipidemia Mother     Hypertension Mother     Diabetes Father     Hyperlipidemia Father     Diabetes Paternal Grandmother        Social History:  Social History     Tobacco Use    Smoking status: Never    Smokeless tobacco: Never "   Substance Use Topics    Alcohol use: Yes     Comment: Occaisonally    Drug use: Never        Review of Systems:  General: No fever, chills  Skin: No rashes  Chest:  Denies cough and sputum production  Heart: Denies chest pain  Resp: Denies dyspnea  Abdomen: Denies diarrhea, abdominal pain, hematemesis, or blood in stool.  Musculoskeletal: No joint stiffness or swelling. Denies back pain.  : see HPI  Neuro: no dizziness or weakness    Allergies:  Cefaclor, Cephalexin, Meperidine, and Promethazine    Medications:    Current Facility-Administered Medications:     meropenem-0.9% sodium chloride 500 mg/50 mL IVPB, 500 mg, Intravenous, Q6H, Zoie Hdz MD, Last Rate: 50 mL/hr at 12/18/22 1449, 500 mg at 12/18/22 1449    Current Outpatient Medications:     ISIBLOOM 0.15-0.03 mg per tablet, TAKE 1 TABLET DAILY FOR BIRTH CONTROL, Disp: 84 tablet, Rfl: 3    nitrofurantoin, macrocrystal-monohydrate, (MACROBID) 100 MG capsule, Take 1 capsule (100 mg total) by mouth 2 (two) times daily., Disp: 14 capsule, Rfl: 0    fosfomycin (MONUROL) 3 gram Pack, Take 3 g by mouth once., Disp: , Rfl:     Physical Exam:  Vitals:    12/18/22 1445   BP: (!) 143/81   Pulse: 108   Resp: (!) 22   Temp:      Body mass index is 34.31 kg/m².  General: awake, alert, cooperative  Head: NC/AT  Ears: external ears normal  Eyes: sclera normal  Lungs: normal inspiration, NAD  Heart: well-perfused  Skin: The skin is warm and dry  Ext: No c/c/e.  Neuro: grossly intact, AOx3    RADIOLOGY:  CT RENAL STONE STUDY ABD PELVIS WO 12/18/2022     CLINICAL HISTORY:  Flank pain, kidney stone suspected;     TECHNIQUE:  Unenhanced imaging renal stone protocol with 2D reformations     Automated exposure control technique utilized for diminishing radiation exposure dose     COMPARISON:  May 2021     FINDINGS:  Unenhanced liver and spleen stable size     Gallbladder mildly distended indistinct wall     Unenhanced pancreas stable     Again left kidney markedly  atrophic.  Right renal large staghorn calculi is re-identified.  Right hydro ureteral nephrosis and mild urothelial prominence diffusely similar to prior exam.     Urinary bladder morphology and mural thickening similar to prior exam given differences in distension.     No obstructive or overt inflammatory bowel findings.  Postsurgical change re-identified.  Mural lipid deposition terminal ileum and proximal colon similar to previous exam which can be seen in chronic inflammatory states     Shotty retroperitoneal lymph nodes may be slightly more prominent as visualized unenhanced imaging     Right basilar atelectasis or airspace opacity new since prior exam     Peritoneal catheter similar position without atypia     Small fat containing ventral abdominal wall hernia re-identified similar     Impression:     New right basilar atelectasis or airspace opacity     Right renal large staghorn calculi and mild hydroureteronephrosis with new mild perinephric stranding and increasing shoddy lymph nodes    LABS:  I personally reviewed the following lab values:  Lab Results   Component Value Date    WBC 20.54 (H) 12/18/2022    HGB 10.5 (L) 12/18/2022    HCT 34.8 (L) 12/18/2022     12/18/2022     12/18/2022    K 4.2 12/18/2022     12/18/2022    CREATININE 1.3 12/18/2022    BUN 14 12/18/2022    CO2 19 (L) 12/18/2022    HGBA1C 5.7 (H) 06/01/2022    CHOL 218 (H) 06/01/2022    TRIG 114 06/01/2022    HDL 54 06/01/2022    ALT 9 (L) 12/18/2022    AST 9 (L) 12/18/2022       URINALYSIS:   Component Ref Range & Units 12:41   (12/18/22) 8 mo ago   (4/13/22) 1 yr ago   (12/8/21) 1 yr ago   (1/21/21) 2 yr ago   (11/17/20)   Specimen UA  Urine, Catheterized  Urine, Clean Catch  Urine, Clean Catch  Urine, Clean Catch  Urine, Catheterized    Color, UA Yellow, Straw, Rica Yellow  Yellow  Rica  Yellow  Rica    Appearance, UA Clear Cloudy Abnormal   Hazy Abnormal   Cloudy Abnormal   Cloudy Abnormal   Hazy Abnormal     pH, UA  5.0 - 8.0 6.0  6.0  5.0  6.0  6.0    Specific Gravity, UA 1.005 - 1.030 1.010  1.015  1.010  1.010  1.010    Protein, UA Negative 2+ Abnormal   1+ Abnormal  CM  1+ Abnormal  CM  1+ Abnormal  CM  1+ Abnormal  CM    Glucose, UA Negative Negative  Negative  Negative  Negative  Negative    Ketones, UA Negative Negative  Negative  Negative  Negative  Negative    Bilirubin (UA) Negative Negative  Negative  Negative  Negative  Negative    Occult Blood UA Negative 2+ Abnormal   1+ Abnormal   1+ Abnormal   1+ Abnormal   1+ Abnormal     Nitrite, UA Negative Positive Abnormal   Negative  Positive Abnormal   Positive Abnormal   Positive Abnormal     Urobilinogen, UA <2.0 EU/dL Negative        Leukocytes, UA Negative 3+ Abnormal   3+ Abnormal   2+ Abnormal   2+ Abnormal   2+ Abnormal         Assessment/Plan:   Addis Delvalle is a 37 y.o. female with functional solitary kidney, right renal stones, pyelonephritis.  Patient is afebrile and denies any fevers.  Her kidney function is normal which, with her functional solitary kidney, proves that she is unobstructed.  White count elevated.  Due to her history of multidrug resistant UTIs, would recommend admission to hospital medicine for IV antibiotics and observation.  No urgent urologic intervention indicated at this time.  She will need outpatient intervention for her stones.  Urology to continue following while she is admitted, call with further questions or concerns.    Thank you for allowing me the opportunity to participate in this patient's care.     Lui Guillory MD  Urology

## 2022-12-18 NOTE — PHARMACY MED REC
"Admission Medication History     The home medication history was taken by Asher Nickerson.    You may go to "Admission" then "Reconcile Home Medications" tabs to review and/or act upon these items.     The home medication list has been updated by the Pharmacy department.   Please read ALL comments highlighted in yellow.   Please address this information as you see fit.    Feel free to contact us if you have any questions or require assistance.      The medications listed below were removed from the home medication list. Please reorder if appropriate:  Patient reports no longer taking the following medication(s):  KETOCONAZOLE 2 % TOPICAL CREAM    Medications listed below were obtained from: Patient/family, [employer], and Patient's pharmacy  (Not in a hospital admission)      Potential issues to be addressed PRIOR TO DISCHARGE: NONE    Asher Nickerson, Venancio-Adv  Pharmacy Technician Specialist-Medication History  [facility] [phone]  Secure chat preferred     Current Outpatient Medications on File Prior to Encounter   Medication Sig Dispense Refill Last Dose    ISIBLOOM 0.15-0.03 mg per tablet TAKE 1 TABLET DAILY FOR BIRTH CONTROL 84 tablet 3 12/17/2022    nitrofurantoin, macrocrystal-monohydrate, (MACROBID) 100 MG capsule Take 1 capsule (100 mg total) by mouth 2 (two) times daily. 14 capsule 0 12/17/2022    fosfomycin (MONUROL) 3 gram Pack Take 3 g by mouth once.   Unknown at recently prescribed; hasn't started taking yet    [DISCONTINUED] ketoconazole (NIZORAL) 2 % cream Apply topically once daily. (Patient not taking: Reported on 9/7/2022) 1 Tube 1                            .        "

## 2022-12-19 LAB
ANION GAP SERPL CALC-SCNC: 12 MMOL/L (ref 8–16)
BASOPHILS # BLD AUTO: 0.04 K/UL (ref 0–0.2)
BASOPHILS NFR BLD: 0.3 % (ref 0–1.9)
BUN SERPL-MCNC: 14 MG/DL (ref 6–20)
CALCIUM SERPL-MCNC: 8.9 MG/DL (ref 8.7–10.5)
CHLORIDE SERPL-SCNC: 112 MMOL/L (ref 95–110)
CO2 SERPL-SCNC: 15 MMOL/L (ref 23–29)
CREAT SERPL-MCNC: 1.1 MG/DL (ref 0.5–1.4)
DIFFERENTIAL METHOD: ABNORMAL
EOSINOPHIL # BLD AUTO: 0.2 K/UL (ref 0–0.5)
EOSINOPHIL NFR BLD: 1.4 % (ref 0–8)
ERYTHROCYTE [DISTWIDTH] IN BLOOD BY AUTOMATED COUNT: 13.9 % (ref 11.5–14.5)
EST. GFR  (NO RACE VARIABLE): >60 ML/MIN/1.73 M^2
GLUCOSE SERPL-MCNC: 158 MG/DL (ref 70–110)
HCT VFR BLD AUTO: 35.5 % (ref 37–48.5)
HGB BLD-MCNC: 10.5 G/DL (ref 12–16)
IMM GRANULOCYTES # BLD AUTO: 0.05 K/UL (ref 0–0.04)
IMM GRANULOCYTES NFR BLD AUTO: 0.4 % (ref 0–0.5)
LYMPHOCYTES # BLD AUTO: 2.3 K/UL (ref 1–4.8)
LYMPHOCYTES NFR BLD: 19.9 % (ref 18–48)
MCH RBC QN AUTO: 26.9 PG (ref 27–31)
MCHC RBC AUTO-ENTMCNC: 29.6 G/DL (ref 32–36)
MCV RBC AUTO: 91 FL (ref 82–98)
MONOCYTES # BLD AUTO: 0.6 K/UL (ref 0.3–1)
MONOCYTES NFR BLD: 5.3 % (ref 4–15)
NEUTROPHILS # BLD AUTO: 8.4 K/UL (ref 1.8–7.7)
NEUTROPHILS NFR BLD: 72.7 % (ref 38–73)
NRBC BLD-RTO: 0 /100 WBC
PLATELET # BLD AUTO: 349 K/UL (ref 150–450)
PMV BLD AUTO: 9.3 FL (ref 9.2–12.9)
POTASSIUM SERPL-SCNC: 4.1 MMOL/L (ref 3.5–5.1)
RBC # BLD AUTO: 3.91 M/UL (ref 4–5.4)
SODIUM SERPL-SCNC: 139 MMOL/L (ref 136–145)
WBC # BLD AUTO: 11.54 K/UL (ref 3.9–12.7)

## 2022-12-19 PROCEDURE — 85025 COMPLETE CBC W/AUTO DIFF WBC: CPT | Performed by: STUDENT IN AN ORGANIZED HEALTH CARE EDUCATION/TRAINING PROGRAM

## 2022-12-19 PROCEDURE — 80048 BASIC METABOLIC PNL TOTAL CA: CPT | Performed by: STUDENT IN AN ORGANIZED HEALTH CARE EDUCATION/TRAINING PROGRAM

## 2022-12-19 PROCEDURE — 99233 SBSQ HOSP IP/OBS HIGH 50: CPT | Mod: ,,, | Performed by: UROLOGY

## 2022-12-19 PROCEDURE — 25000003 PHARM REV CODE 250: Performed by: STUDENT IN AN ORGANIZED HEALTH CARE EDUCATION/TRAINING PROGRAM

## 2022-12-19 PROCEDURE — 36415 COLL VENOUS BLD VENIPUNCTURE: CPT | Performed by: STUDENT IN AN ORGANIZED HEALTH CARE EDUCATION/TRAINING PROGRAM

## 2022-12-19 PROCEDURE — 11000001 HC ACUTE MED/SURG PRIVATE ROOM

## 2022-12-19 PROCEDURE — 63600175 PHARM REV CODE 636 W HCPCS: Performed by: EMERGENCY MEDICINE

## 2022-12-19 PROCEDURE — 99233 PR SUBSEQUENT HOSPITAL CARE,LEVL III: ICD-10-PCS | Mod: ,,, | Performed by: UROLOGY

## 2022-12-19 RX ADMIN — MEROPENEM AND SODIUM CHLORIDE 500 MG: 500 INJECTION, SOLUTION INTRAVENOUS at 03:12

## 2022-12-19 RX ADMIN — MEROPENEM AND SODIUM CHLORIDE 500 MG: 500 INJECTION, SOLUTION INTRAVENOUS at 02:12

## 2022-12-19 RX ADMIN — ACETAMINOPHEN 650 MG: 325 TABLET ORAL at 02:12

## 2022-12-19 RX ADMIN — MEROPENEM AND SODIUM CHLORIDE 500 MG: 500 INJECTION, SOLUTION INTRAVENOUS at 09:12

## 2022-12-19 NOTE — ASSESSMENT & PLAN NOTE
This patient does have evidence of infective focus  My overall impression is sepsis. Vital signs were reviewed and noted in progress note.  Antibiotics given-   Antibiotics (From admission, onward)    Start     Stop Route Frequency Ordered    12/18/22 1430  meropenem-0.9% sodium chloride 500 mg/50 mL IVPB         -- IV Every 6 hours (non-standard times) 12/18/22 1244        Cultures were taken-   Microbiology Results (last 7 days)     Procedure Component Value Units Date/Time    Urine culture [[id number]] Collected: 12/18/22 1241    Order Status: No result Specimen: Urine Updated: 12/18/22 1316    Blood Culture #2 **CANNOT BE ORDERED STAT** [[id number]] Collected: 12/18/22 1244    Order Status: Sent Specimen: Blood from Peripheral, Antecubital, Right Updated: 12/18/22 1245    Blood Culture #1 **CANNOT BE ORDERED STAT** [[id number]] Collected: 12/18/22 1213    Order Status: Sent Specimen: Blood from Peripheral, Antecubital, Left Updated: 12/18/22 1213        Latest lactate reviewed, they are-  Recent Labs   Lab 12/18/22  1213   LACTATE 1.3       Organ dysfunction indicated by Acute kidney injury  Source- UTI    Source control Achieved by- IV abx

## 2022-12-19 NOTE — PLAN OF CARE
[facility] - Med Surg  Initial Discharge Assessment       Primary Care Provider: Madie Horn MD    Admission Diagnosis: Staghorn calculus [N20.0]  Pyelonephritis [N12]  Tachycardia [R00.0]  Sepsis, due to unspecified organism, unspecified whether acute organ dysfunction present [A41.9]    Admission Date: 12/18/2022  Expected Discharge Date:     Discharge Barriers Identified: None    Payor: MEDICAID / Plan: [employer] [facility] / Product Type: Managed Medicaid /     Extended Emergency Contact Information  Primary Emergency Contact: Lana Delvalle  Mobile Phone: [phone]  Relation: Mother    Discharge Plan A: Home with family         [facility] [address]  [address] [address]  Phone: [phone] Fax: [phone]      Initial Assessment (most recent)       Adult Discharge Assessment - 12/19/22 1358          Discharge Assessment    Assessment Type Discharge Planning Assessment     Confirmed/corrected address, phone number and insurance Yes     Confirmed Demographics Correct on Facesheet     Source of Information patient     Does patient/caregiver understand observation status Yes     Communicated ANGELES with patient/caregiver Date not available/Unable to determine     Reason For Admission Severe sepsis     People in Home parent(s)     Facility Arrived From: home     Do you expect to return to your current living situation? Yes     Do you have help at home or someone to help you manage your care at home? Yes     Who are your caregiver(s) and their phone number(s)? parents     Prior to hospitilization cognitive status: Alert/Oriented     Current cognitive status: Alert/Oriented     Home Accessibility wheelchair accessible     Equipment Currently Used at Home none     Readmission within 30 days? No     Patient currently being followed by outpatient case management? No     Do you currently have service(s) that help you manage your care at home? No      Do you take prescription medications? Yes     Do you have prescription coverage? Yes     Coverage [employer] Medicaid     Do you have any problems affording any of your prescribed medications? No     Is the patient taking medications as prescribed? yes     Who is going to help you get home at discharge? parents     How do you get to doctors appointments? family or friend will provide     Are you on dialysis? No     Do you take coumadin? No     Discharge Plan A Home with family     DME Needed Upon Discharge  none     Discharge Plan discussed with: Patient     Discharge Barriers Identified None                     Anticipated DC dispo: home with family  Prior Level of Function: independent with ADL's  PCP: Madie Horn MD    Comments:  CM met with patient at bedside to introduce role and discuss discharge planning. Patient lives with parents who will also be help at home and can provide transport at time of discharge. CM discharge needs depends on hospital progress. CM will continue following to assist with other needs.

## 2022-12-19 NOTE — PROGRESS NOTES
Subjective:   NAEO, feeling better, tolerating regular diet     Objective:     Temp:  [98.5 °F (36.9 °C)-99.1 °F (37.3 °C)] 98.6 °F (37 °C)  Pulse:  [] 88  Resp:  [16-25] 16  SpO2:  [96 %-98 %] 97 %  BP: (121-144)/(59-81) 123/67  I/O last 3 completed shifts:  In: 2048 [IV Piggyback:2048]  Out: -       General: awake, alert, cooperative  Head: NC/AT  Ears: external ears normal  Eyes: sclera normal  Lungs: normal inspiration, NAD  Heart: well-perfused  Skin: The skin is warm and dry  Ext: No c/c/e.  Neuro: grossly intact, AOx3      Labs:   Recent Labs   Lab 12/18/22  1213 12/19/22  0535    139   K 4.2 4.1    112*   CO2 19* 15*   BUN 14 14   CREATININE 1.3 1.1   CALCIUM 9.7 8.9     Recent Labs   Lab 12/18/22  1213 12/19/22  0535   WBC 20.54* 11.54   HGB 10.5* 10.5*   HCT 34.8* 35.5*    349       Assessment/Plan:   Addis Delvalle is a 37 y.o. female with functional solitary kidney, right renal stones, pyelonephritis.  Patient continues to be afebrile and denies any fevers.  Her kidney function is normal which, with her functional solitary kidney, proves that she is unobstructed.  White count improved with just IV abx.  Due to her history of multidrug resistant UTIs, would recommend admission to hospital medicine for IV antibiotics and observation.  No urgent urologic intervention indicated at this time.  She will need outpatient intervention for her stones.  Urology to continue following while she is admitted, call with further questions or concerns.      Lui Guillory MD

## 2022-12-19 NOTE — SUBJECTIVE & OBJECTIVE
Past Medical History:   Diagnosis Date    Spina bifida     Spina bifida        Past Surgical History:   Procedure Laterality Date    BLADDER AUGMENTATION      CSF SHUNT      INSERTION OF SUPRAPUBIC CATHETER  1990       Review of patient's allergies indicates:   Allergen Reactions    Cefaclor     Cephalexin     Meperidine      vomiting    Promethazine        No current facility-administered medications on file prior to encounter.     Current Outpatient Medications on File Prior to Encounter   Medication Sig    ISIBLOOM 0.15-0.03 mg per tablet TAKE 1 TABLET DAILY FOR BIRTH CONTROL    nitrofurantoin, macrocrystal-monohydrate, (MACROBID) 100 MG capsule Take 1 capsule (100 mg total) by mouth 2 (two) times daily.    fosfomycin (MONUROL) 3 gram Pack Take 3 g by mouth once.    [DISCONTINUED] ISIBLOOM 0.15-0.03 mg per tablet TAKE 1 TABLET DAILY FOR BIRTH CONTROL (Patient not taking: Reported on 9/7/2022)    [DISCONTINUED] ISIBLOOM 0.15-0.03 mg per tablet TAKE 1 TABLET DAILY FOR BIRTH CONTROL    [DISCONTINUED] ketoconazole (NIZORAL) 2 % cream Apply topically once daily. (Patient not taking: Reported on 9/7/2022)    [DISCONTINUED] nitrofurantoin, macrocrystal-monohydrate, (MACROBID) 100 MG capsule Take 1 capsule (100 mg total) by mouth 2 (two) times daily. (Patient not taking: Reported on 9/7/2022)    [DISCONTINUED] UNABLE TO FIND Urinary Catheter:  12 Korean long tip male straight cath, 1 each for in and out caths 4 times daily  Dispense # 120 (Patient not taking: Reported on 9/7/2022)     Family History       Problem Relation (Age of Onset)    Diabetes Father, Paternal Grandmother    Hyperlipidemia Mother, Father    Hypertension Mother          Tobacco Use    Smoking status: Never    Smokeless tobacco: Never   Substance and Sexual Activity    Alcohol use: Yes     Comment: Occaisonally    Drug use: Never    Sexual activity: Never     Review of Systems   Constitutional:  Negative for chills and fever.   HENT: Negative.      Respiratory:  Negative for cough, shortness of breath and wheezing.    Cardiovascular:  Negative for chest pain.   Gastrointestinal:  Positive for abdominal pain. Negative for diarrhea, nausea and vomiting.   Genitourinary:  Negative for difficulty urinating.   Musculoskeletal:  Negative for arthralgias and back pain.   Neurological:  Negative for dizziness and headaches.   Objective:     Vital Signs (Most Recent):  Temp: 98.7 °F (37.1 °C) (12/18/22 1807)  Pulse: 99 (12/18/22 1807)  Resp: 16 (12/18/22 1807)  BP: (!) 132/59 (12/18/22 1807)  SpO2: 97 % (12/18/22 1807)   Vital Signs (24h Range):  Temp:  [98.7 °F (37.1 °C)-99.1 °F (37.3 °C)] 98.7 °F (37.1 °C)  Pulse:  [] 99  Resp:  [16-25] 16  SpO2:  [96 %-98 %] 97 %  BP: (126-144)/(59-81) 132/59     Weight: 77 kg (169 lb 13.8 oz)  Body mass index is 34.31 kg/m².    Physical Exam  Constitutional:       General: She is not in acute distress.     Appearance: Normal appearance. She is not ill-appearing.   HENT:      Head: Normocephalic and atraumatic.   Cardiovascular:      Rate and Rhythm: Normal rate and regular rhythm.   Pulmonary:      Effort: Pulmonary effort is normal.      Breath sounds: Normal breath sounds.   Abdominal:      General: Abdomen is flat. There is no distension.      Palpations: Abdomen is soft.      Tenderness: There is abdominal tenderness. There is right CVA tenderness.   Musculoskeletal:         General: Normal range of motion.      Right lower leg: No edema.      Left lower leg: No edema.   Skin:     General: Skin is warm and dry.   Neurological:      Mental Status: She is alert and oriented to person, place, and time. Mental status is at baseline.           Significant Labs: All pertinent labs within the past 24 hours have been reviewed.    Significant Imaging: I have reviewed all pertinent imaging results/findings within the past 24 hours.

## 2022-12-19 NOTE — H&P
[facility] Medicine  History & Physical    Patient Name: Addis Delvalle  MRN: [id number]  Patient Class: IP- Inpatient  Admission Date: 12/18/2022  Attending Physician: Geoff Elliott MD  Primary Care Provider: Madie Horn MD         Patient information was obtained from patient, past medical records and ER records.     Subjective:     Principal Problem:Severe sepsis    Chief Complaint:   Chief Complaint   Patient presents with    Flank Pain     Right-sided flank pain began Friday, patient only has right kidney, denies dysuria, on medication for UTI currently        HPI: 37 y.o. female with PMH spina bifida, functional solitary kidney, right renal stones, pyelonephritis, multidrug resistant UTIs presents to ED with c/o right flank pain. Patient is afebrile and denies any fevers. Pt states she visited an outpatient clinic and was told she had a UTI and was given antibiotics. Pt also notes that she was told she had kidney stones 3 years ago but never received surgery for it and has not had problems with it since. Denies nausea, vomiting, diarrhea, CP, SOB    Admit for sepsis s/t pyelonephritis      Past Medical History:   Diagnosis Date    Spina bifida     Spina bifida        Past Surgical History:   Procedure Laterality Date    BLADDER AUGMENTATION      CSF SHUNT      INSERTION OF SUPRAPUBIC CATHETER  1990       Review of patient's allergies indicates:   Allergen Reactions    Cefaclor     Cephalexin     Meperidine      vomiting    Promethazine        No current facility-administered medications on file prior to encounter.     Current Outpatient Medications on File Prior to Encounter   Medication Sig    ISIBLOOM 0.15-0.03 mg per tablet TAKE 1 TABLET DAILY FOR BIRTH CONTROL    nitrofurantoin, macrocrystal-monohydrate, (MACROBID) 100 MG capsule Take 1 capsule (100 mg total) by mouth 2 (two) times daily.    fosfomycin (MONUROL) 3 gram Pack Take 3 g by mouth once.    [DISCONTINUED]  ISIBLOOM 0.15-0.03 mg per tablet TAKE 1 TABLET DAILY FOR BIRTH CONTROL (Patient not taking: Reported on 9/7/2022)    [DISCONTINUED] ISIBLOOM 0.15-0.03 mg per tablet TAKE 1 TABLET DAILY FOR BIRTH CONTROL    [DISCONTINUED] ketoconazole (NIZORAL) 2 % cream Apply topically once daily. (Patient not taking: Reported on 9/7/2022)    [DISCONTINUED] nitrofurantoin, macrocrystal-monohydrate, (MACROBID) 100 MG capsule Take 1 capsule (100 mg total) by mouth 2 (two) times daily. (Patient not taking: Reported on 9/7/2022)    [DISCONTINUED] UNABLE TO FIND Urinary Catheter:  12 [place] long tip male straight cath, 1 each for in and out caths 4 times daily  Dispense # 120 (Patient not taking: Reported on 9/7/2022)     Family History       Problem Relation (Age of Onset)    Diabetes Father, Paternal Grandmother    Hyperlipidemia Mother, Father    Hypertension Mother          Tobacco Use    Smoking status: Never    Smokeless tobacco: Never   Substance and Sexual Activity    Alcohol use: Yes     Comment: Occaisonally    Drug use: Never    Sexual activity: Never     Review of Systems   Constitutional:  Negative for chills and fever.   HENT: Negative.     Respiratory:  Negative for cough, shortness of breath and wheezing.    Cardiovascular:  Negative for chest pain.   Gastrointestinal:  Positive for abdominal pain. Negative for diarrhea, nausea and vomiting.   Genitourinary:  Negative for difficulty urinating.   Musculoskeletal:  Negative for arthralgias and back pain.   Neurological:  Negative for dizziness and headaches.   Objective:     Vital Signs (Most Recent):  Temp: 98.7 °F (37.1 °C) (12/18/22 1807)  Pulse: 99 (12/18/22 1807)  Resp: 16 (12/18/22 1807)  BP: (!) 132/59 (12/18/22 1807)  SpO2: 97 % (12/18/22 1807)   Vital Signs (24h Range):  Temp:  [98.7 °F (37.1 °C)-99.1 °F (37.3 °C)] 98.7 °F (37.1 °C)  Pulse:  [] 99  Resp:  [16-25] 16  SpO2:  [96 %-98 %] 97 %  BP: (126-144)/(59-81) 132/59     Weight: 77 kg (169 lb 13.8  oz)  Body mass index is 34.31 kg/m².    Physical Exam  Constitutional:       General: She is not in acute distress.     Appearance: Normal appearance. She is not ill-appearing.   HENT:      Head: Normocephalic and atraumatic.   Cardiovascular:      Rate and Rhythm: Normal rate and regular rhythm.   Pulmonary:      Effort: Pulmonary effort is normal.      Breath sounds: Normal breath sounds.   Abdominal:      General: Abdomen is flat. There is no distension.      Palpations: Abdomen is soft.      Tenderness: There is abdominal tenderness. There is right CVA tenderness.   Musculoskeletal:         General: Normal range of motion.      Right lower leg: No edema.      Left lower leg: No edema.   Skin:     General: Skin is warm and dry.   Neurological:      Mental Status: She is alert and oriented to person, place, and time. Mental status is at baseline.           Significant Labs: All pertinent labs within the past 24 hours have been reviewed.    Significant Imaging: I have reviewed all pertinent imaging results/findings within the past 24 hours.    Assessment/Plan:     * Severe sepsis  This patient does have evidence of infective focus  My overall impression is sepsis. Vital signs were reviewed and noted in progress note.  Antibiotics given-   Antibiotics (From admission, onward)    Start     Stop Route Frequency Ordered    12/18/22 1430  meropenem-0.9% sodium chloride 500 mg/50 mL IVPB         -- IV Every 6 hours (non-standard times) 12/18/22 1244        Cultures were taken-   Microbiology Results (last 7 days)     Procedure Component Value Units Date/Time    Urine culture [[id number]] Collected: 12/18/22 1241    Order Status: No result Specimen: Urine Updated: 12/18/22 1316    Blood Culture #2 **CANNOT BE ORDERED STAT** [[id number]] Collected: 12/18/22 1244    Order Status: Sent Specimen: Blood from Peripheral, Antecubital, Right Updated: 12/18/22 1245    Blood Culture #1 **CANNOT BE ORDERED STAT** [[id number]]  Collected: 12/18/22 1213    Order Status: Sent Specimen: Blood from Peripheral, Antecubital, Left Updated: 12/18/22 1213        Latest lactate reviewed, they are-  Recent Labs   Lab 12/18/22 1213   LACTATE 1.3       Organ dysfunction indicated by Acute kidney injury  Source- UTI    Source control Achieved by- IV abx      Pyelonephritis  IV abx  Pain control      UTI (urinary tract infection)  Hx of ESBL  IV merrem, f/u urine cx      Neurogenic bladder  Urology consult        VTE Risk Mitigation (From admission, onward)         Ordered     IP VTE HIGH RISK PATIENT  Once         12/18/22 1757     Place sequential compression device  Until discontinued         12/18/22 1757                   Geoff Elliott MD  Department of Hospital Medicine   [facility]

## 2022-12-19 NOTE — HPI
37 y.o. female with PMH spina bifida, functional solitary kidney, right renal stones, pyelonephritis, multidrug resistant UTIs presents to ED with c/o right flank pain. Patient is afebrile and denies any fevers. Pt states she visited an outpatient clinic and was told she had a UTI and was given antibiotics. Pt also notes that she was told she had kidney stones 3 years ago but never received surgery for it and has not had problems with it since. Denies nausea, vomiting, diarrhea, CP, SOB    Admit for sepsis s/t pyelonephritis

## 2022-12-19 NOTE — PLAN OF CARE
Discussed poc with pt, pt verbalized understanding     Purposeful rounding every 2hours     Fall precautions in place, remains injury free  Pt denies c/o nausea  Pain being managed with PRN meds     Accurate I&Os  Abx given as prescribed  Bed locked at lowest position  Call light within reach     Chart check complete  Reviewed active orders  Will continue with POC

## 2022-12-20 LAB
ANION GAP SERPL CALC-SCNC: 9 MMOL/L (ref 8–16)
BASOPHILS # BLD AUTO: 0.04 K/UL (ref 0–0.2)
BASOPHILS NFR BLD: 0.4 % (ref 0–1.9)
BUN SERPL-MCNC: 13 MG/DL (ref 6–20)
CALCIUM SERPL-MCNC: 9.2 MG/DL (ref 8.7–10.5)
CHLORIDE SERPL-SCNC: 110 MMOL/L (ref 95–110)
CO2 SERPL-SCNC: 21 MMOL/L (ref 23–29)
CREAT SERPL-MCNC: 0.9 MG/DL (ref 0.5–1.4)
DIFFERENTIAL METHOD: ABNORMAL
EOSINOPHIL # BLD AUTO: 0.2 K/UL (ref 0–0.5)
EOSINOPHIL NFR BLD: 2.4 % (ref 0–8)
ERYTHROCYTE [DISTWIDTH] IN BLOOD BY AUTOMATED COUNT: 13.7 % (ref 11.5–14.5)
EST. GFR  (NO RACE VARIABLE): >60 ML/MIN/1.73 M^2
GLUCOSE SERPL-MCNC: 136 MG/DL (ref 70–110)
HCT VFR BLD AUTO: 32.2 % (ref 37–48.5)
HGB BLD-MCNC: 9.8 G/DL (ref 12–16)
IMM GRANULOCYTES # BLD AUTO: 0.05 K/UL (ref 0–0.04)
IMM GRANULOCYTES NFR BLD AUTO: 0.5 % (ref 0–0.5)
LYMPHOCYTES # BLD AUTO: 2.2 K/UL (ref 1–4.8)
LYMPHOCYTES NFR BLD: 22.2 % (ref 18–48)
MCH RBC QN AUTO: 26.8 PG (ref 27–31)
MCHC RBC AUTO-ENTMCNC: 30.4 G/DL (ref 32–36)
MCV RBC AUTO: 88 FL (ref 82–98)
MONOCYTES # BLD AUTO: 0.9 K/UL (ref 0.3–1)
MONOCYTES NFR BLD: 8.9 % (ref 4–15)
NEUTROPHILS # BLD AUTO: 6.4 K/UL (ref 1.8–7.7)
NEUTROPHILS NFR BLD: 65.6 % (ref 38–73)
NRBC BLD-RTO: 0 /100 WBC
PLATELET # BLD AUTO: 368 K/UL (ref 150–450)
PMV BLD AUTO: 9.1 FL (ref 9.2–12.9)
POTASSIUM SERPL-SCNC: 4.3 MMOL/L (ref 3.5–5.1)
RBC # BLD AUTO: 3.65 M/UL (ref 4–5.4)
SODIUM SERPL-SCNC: 140 MMOL/L (ref 136–145)
WBC # BLD AUTO: 9.77 K/UL (ref 3.9–12.7)

## 2022-12-20 PROCEDURE — 63600175 PHARM REV CODE 636 W HCPCS: Performed by: EMERGENCY MEDICINE

## 2022-12-20 PROCEDURE — 11000001 HC ACUTE MED/SURG PRIVATE ROOM

## 2022-12-20 PROCEDURE — 25000003 PHARM REV CODE 250: Performed by: HOSPITALIST

## 2022-12-20 PROCEDURE — 25000003 PHARM REV CODE 250: Performed by: STUDENT IN AN ORGANIZED HEALTH CARE EDUCATION/TRAINING PROGRAM

## 2022-12-20 PROCEDURE — 99233 PR SUBSEQUENT HOSPITAL CARE,LEVL III: ICD-10-PCS | Mod: ,,, | Performed by: UROLOGY

## 2022-12-20 PROCEDURE — 85025 COMPLETE CBC W/AUTO DIFF WBC: CPT | Performed by: STUDENT IN AN ORGANIZED HEALTH CARE EDUCATION/TRAINING PROGRAM

## 2022-12-20 PROCEDURE — 36415 COLL VENOUS BLD VENIPUNCTURE: CPT | Performed by: STUDENT IN AN ORGANIZED HEALTH CARE EDUCATION/TRAINING PROGRAM

## 2022-12-20 PROCEDURE — 99233 SBSQ HOSP IP/OBS HIGH 50: CPT | Mod: ,,, | Performed by: UROLOGY

## 2022-12-20 PROCEDURE — 25000003 PHARM REV CODE 250: Performed by: NURSE PRACTITIONER

## 2022-12-20 PROCEDURE — 80048 BASIC METABOLIC PNL TOTAL CA: CPT | Performed by: STUDENT IN AN ORGANIZED HEALTH CARE EDUCATION/TRAINING PROGRAM

## 2022-12-20 RX ORDER — SODIUM CHLORIDE 9 MG/ML
INJECTION, SOLUTION INTRAVENOUS
Status: DISCONTINUED | OUTPATIENT
Start: 2022-12-20 | End: 2022-12-22 | Stop reason: HOSPADM

## 2022-12-20 RX ORDER — BUTALBITAL, ACETAMINOPHEN AND CAFFEINE 50; 325; 40 MG/1; MG/1; MG/1
1 TABLET ORAL EVERY 6 HOURS PRN
Status: DISCONTINUED | OUTPATIENT
Start: 2022-12-20 | End: 2022-12-22 | Stop reason: HOSPADM

## 2022-12-20 RX ADMIN — MEROPENEM AND SODIUM CHLORIDE 500 MG: 500 INJECTION, SOLUTION INTRAVENOUS at 02:12

## 2022-12-20 RX ADMIN — BUTALBITAL, ACETAMINOPHEN AND CAFFEINE 1 TABLET: 50; 325; 40 TABLET ORAL at 10:12

## 2022-12-20 RX ADMIN — MEROPENEM AND SODIUM CHLORIDE 500 MG: 500 INJECTION, SOLUTION INTRAVENOUS at 08:12

## 2022-12-20 RX ADMIN — ACETAMINOPHEN 650 MG: 325 TABLET ORAL at 03:12

## 2022-12-20 RX ADMIN — MEROPENEM AND SODIUM CHLORIDE 500 MG: 500 INJECTION, SOLUTION INTRAVENOUS at 09:12

## 2022-12-20 RX ADMIN — SODIUM CHLORIDE: 9 INJECTION, SOLUTION INTRAVENOUS at 02:12

## 2022-12-20 RX ADMIN — SODIUM CHLORIDE: 9 INJECTION, SOLUTION INTRAVENOUS at 08:12

## 2022-12-20 NOTE — PROGRESS NOTES
Subjective:   NAEO, feeling better, tolerating regular diet, no issues with cathing, less mucus     Objective:     Temp:  [97.8 °F (36.6 °C)-100.1 °F (37.8 °C)] 97.8 °F (36.6 °C)  Pulse:  [84-99] 85  Resp:  [16-18] 16  SpO2:  [95 %-98 %] 97 %  BP: (117-169)/(67-81) 142/75  I/O last 3 completed shifts:  In: 427.3 [P.O.:240; IV Piggyback:187.3]  Out: -       General: awake, alert, cooperative  Head: NC/AT  Ears: external ears normal  Eyes: sclera normal  Lungs: normal inspiration, NAD  Heart: well-perfused  Skin: The skin is warm and dry  Ext: No c/c/e.  Neuro: grossly intact, AOx3      Labs:   Recent Labs   Lab 12/18/22  1213 12/19/22  0535 12/20/22  0527    139 140   K 4.2 4.1 4.3    112* 110   CO2 19* 15* 21*   BUN 14 14 13   CREATININE 1.3 1.1 0.9   CALCIUM 9.7 8.9 9.2     Recent Labs   Lab 12/18/22  1213 12/19/22  0535 12/20/22  0527   WBC 20.54* 11.54 9.77   HGB 10.5* 10.5* 9.8*   HCT 34.8* 35.5* 32.2*    349 368       Assessment/Plan:   Addis Delvalle is a 37 y.o. female with functional solitary kidney, right renal stones, pyelonephritis.  Patient continues to be afebrile and denies any fevers.  Her kidney function is normal which, with her functional solitary kidney, proves that she is unobstructed.  White count improved with just IV abx.  Due to her history of multidrug resistant UTIs, would recommend admission to hospital medicine for IV antibiotics and observation.  No urgent urologic intervention indicated at this time.  She will need outpatient intervention for her stones.  Urology to continue following while she is admitted, call with further questions or concerns.    Lui Guillory MD

## 2022-12-20 NOTE — PROGRESS NOTES
[facility] Medicine  Progress Note    Patient Name: Addis Delvalle  MRN: [id number]  Patient Class: IP- Inpatient   Admission Date: 12/18/2022  Length of Stay: 2 days  Attending Physician: Brody Funes MD  Primary Care Provider: Madie Horn MD        Subjective:     Principal Problem:Severe sepsis        HPI:  37 y.o. female with PMH spina bifida, functional solitary kidney, right renal stones, pyelonephritis, multidrug resistant UTIs presents to ED with c/o right flank pain. Patient is afebrile and denies any fevers. Pt states she visited an outpatient clinic and was told she had a UTI and was given antibiotics. Pt also notes that she was told she had kidney stones 3 years ago but never received surgery for it and has not had problems with it since. Denies nausea, vomiting, diarrhea, CP, SOB    Admit for sepsis s/t pyelonephritis      Overview/Hospital Course:  No notes on file    Interval History: NAEON. Patient ambulating in noel, denies fevers, flank pain, dysuria, SOB, chest pain.    Review of Systems   All other systems reviewed and are negative.  Objective:     Vital Signs (Most Recent):  Temp: 98.4 °F (36.9 °C) (12/20/22 1135)  Pulse: 82 (12/20/22 1135)  Resp: 18 (12/20/22 1135)  BP: 136/84 (12/20/22 1135)  SpO2: 98 % (12/20/22 1135) Vital Signs (24h Range):  Temp:  [97.8 °F (36.6 °C)-99.4 °F (37.4 °C)] 98.4 °F (36.9 °C)  Pulse:  [82-99] 82  Resp:  [16-18] 18  SpO2:  [95 %-98 %] 98 %  BP: (117-169)/(67-84) 136/84     Weight: 79.1 kg (174 lb 6.1 oz)  Body mass index is 35.22 kg/m².    Intake/Output Summary (Last 24 hours) at 12/20/2022 1423  Last data filed at 12/19/2022 1835  Gross per 24 hour   Intake 427.25 ml   Output --   Net 427.25 ml      Physical Exam  Vitals and nursing note reviewed.   Constitutional:       General: She is not in acute distress.     Appearance: Normal appearance. She is normal weight.   Cardiovascular:      Rate and Rhythm: Normal rate and regular rhythm.       Heart sounds: No murmur heard.  Pulmonary:      Effort: Pulmonary effort is normal. No respiratory distress.      Breath sounds: No wheezing.   Neurological:      General: No focal deficit present.      Mental Status: She is alert and oriented to person, place, and time.   Psychiatric:         Mood and Affect: Mood normal.         Behavior: Behavior normal.       Significant Labs: All pertinent labs within the past 24 hours have been reviewed.  CBC:   Recent Labs   Lab 12/19/22  0535 12/20/22  0527   WBC 11.54 9.77   HGB 10.5* 9.8*   HCT 35.5* 32.2*    368     CMP:   Recent Labs   Lab 12/19/22 0535 12/20/22  0527    140   K 4.1 4.3   * 110   CO2 15* 21*   * 136*   BUN 14 13   CREATININE 1.1 0.9   CALCIUM 8.9 9.2   ANIONGAP 12 9       Significant Imaging: I have reviewed all pertinent imaging results/findings within the past 24 hours.      Assessment/Plan:      * Severe sepsis  This patient does have evidence of infective focus  My overall impression is sepsis. Vital signs were reviewed and noted in progress note.  Antibiotics given-   Antibiotics (From admission, onward)    Start     Stop Route Frequency Ordered    12/18/22 1430  meropenem-0.9% sodium chloride 500 mg/50 mL IVPB         -- IV Every 6 hours (non-standard times) 12/18/22 1244        Cultures were taken-   Microbiology Results (last 7 days)     Procedure Component Value Units Date/Time    Blood Culture #2 **CANNOT BE ORDERED STAT** [[id number]]  (Abnormal) Collected: 12/18/22 1244    Order Status: Completed Specimen: Blood from Peripheral, Antecubital, Right Updated: 12/20/22 1403     Blood Culture, Routine Gram stain tim bottle: Gram negative rods      Results called to and read back by: Patti VILLAR RN 12/19/2022  12:12      GRAM NEGATIVE PAUL  Identification and susceptibility pending      Blood Culture #1 **CANNOT BE ORDERED STAT** [[id number]] Collected: 12/18/22 1213    Order Status: Completed Specimen: Blood from Peripheral,  Antecubital, Left Updated: 12/19/22 2012     Blood Culture, Routine No Growth to date      No Growth to date    Urine culture [[id number]]  (Abnormal) Collected: 12/18/22 1241    Order Status: Completed Specimen: Urine Updated: 12/19/22 2012     Urine Culture, Routine PRESUMPTIVE E COLI  >100,000 cfu/ml  Identification and susceptibility pending      Narrative:      Specimen Source->Urine        Latest lactate reviewed, they are-  Recent Labs   Lab 12/18/22  1213   LACTATE 1.3       Organ dysfunction indicated by Acute kidney injury  Source- UTI    Source control Achieved by- IV abx    Pyelonephritis  As above    UTI (urinary tract infection)  Hx of ESBL  IV merrem, f/u urine cx - prelim E. Coli  1 of 2 blood cultures +gram stain      Neurogenic bladder  Urology consulted  No surgical intervention at this time      VTE Risk Mitigation (From admission, onward)         Ordered     IP VTE HIGH RISK PATIENT  Once         12/18/22 1757     Place sequential compression device  Until discontinued         12/18/22 1757                Discharge Planning   ANGELES:      Code Status: Full Code   Is the patient medically ready for discharge?:     Reason for patient still in hospital (select all that apply): Patient trending condition, Laboratory test and Treatment  Discharge Plan A: Home with family        Brody Funes MD  Department of Hospital Medicine   [facility]

## 2022-12-20 NOTE — PROGRESS NOTES
[facility] Medicine  Progress Note    Patient Name: Addis Delvalle  MRN: [id number]  Patient Class: IP- Inpatient   Admission Date: 12/18/2022  Length of Stay: 1 days  Attending Physician: Brody Funes MD  Primary Care Provider: Madie Horn MD        Subjective:     Principal Problem:Severe sepsis        HPI:  37 y.o. female with PMH spina bifida, functional solitary kidney, right renal stones, pyelonephritis, multidrug resistant UTIs presents to ED with c/o right flank pain. Patient is afebrile and denies any fevers. Pt states she visited an outpatient clinic and was told she had a UTI and was given antibiotics. Pt also notes that she was told she had kidney stones 3 years ago but never received surgery for it and has not had problems with it since. Denies nausea, vomiting, diarrhea, CP, SOB    Admit for sepsis s/t pyelonephritis      Overview/Hospital Course:  No notes on file    Interval History: NAEON. Patient reports pain controlled, denies chest pain, SOB, fevers, nausea.    Review of Systems  Objective:     Vital Signs (Most Recent):  Temp: 99.4 °F (37.4 °C) (12/19/22 1611)  Pulse: 96 (12/19/22 1611)  Resp: 18 (12/19/22 1611)  BP: 117/76 (12/19/22 1611)  SpO2: 95 % (12/19/22 1611) Vital Signs (24h Range):  Temp:  [98.3 °F (36.8 °C)-100.1 °F (37.8 °C)] 99.4 °F (37.4 °C)  Pulse:  [] 96  Resp:  [16-18] 18  SpO2:  [95 %-98 %] 95 %  BP: (117-139)/(67-81) 117/76     Weight: 79.1 kg (174 lb 6.1 oz)  Body mass index is 35.22 kg/m².    Intake/Output Summary (Last 24 hours) at 12/19/2022 1824  Last data filed at 12/19/2022 1543  Gross per 24 hour   Intake 240 ml   Output --   Net 240 ml      Physical Exam  Vitals and nursing note reviewed.   Constitutional:       General: She is not in acute distress.     Appearance: Normal appearance. She is normal weight.   Cardiovascular:      Rate and Rhythm: Normal rate and regular rhythm.      Heart sounds: No murmur heard.  Pulmonary:      Effort:  Pulmonary effort is normal. No respiratory distress.      Breath sounds: No wheezing.   Neurological:      General: No focal deficit present.      Mental Status: She is alert and oriented to person, place, and time.   Psychiatric:         Mood and Affect: Mood normal.         Behavior: Behavior normal.       Significant Labs: All pertinent labs within the past 24 hours have been reviewed.  Blood Culture:   Recent Labs   Lab 12/18/22  1213 12/18/22  1244   LABBLOO No Growth to date Gram stain tim bottle: Gram negative rods  Results called to and read back by: Patti VILLAR RN 12/19/2022  12:12     CBC:   Recent Labs   Lab 12/18/22  1213 12/19/22  0535   WBC 20.54* 11.54   HGB 10.5* 10.5*   HCT 34.8* 35.5*    349     CMP:   Recent Labs   Lab 12/18/22  1213 12/19/22  0535    139   K 4.2 4.1    112*   CO2 19* 15*   * 158*   BUN 14 14   CREATININE 1.3 1.1   CALCIUM 9.7 8.9   PROT 8.2  --    ALBUMIN 2.7*  --    BILITOT 0.8  --    ALKPHOS 88  --    AST 9*  --    ALT 9*  --    ANIONGAP 14 12     Urine Culture: No results for input(s): LABURIN in the last 48 hours.    Significant Imaging: I have reviewed all pertinent imaging results/findings within the past 24 hours.      Assessment/Plan:      * Severe sepsis  This patient does have evidence of infective focus  My overall impression is sepsis. Vital signs were reviewed and noted in progress note.  Antibiotics given-   Antibiotics (From admission, onward)    Start     Stop Route Frequency Ordered    12/18/22 1430  meropenem-0.9% sodium chloride 500 mg/50 mL IVPB         -- IV Every 6 hours (non-standard times) 12/18/22 1244        Cultures were taken-   Microbiology Results (last 7 days)     Procedure Component Value Units Date/Time    Blood Culture #2 **CANNOT BE ORDERED STAT** [[id number]] Collected: 12/18/22 1244    Order Status: Completed Specimen: Blood from Peripheral, Antecubital, Right Updated: 12/19/22 1214     Blood Culture, Routine Gram stain  tim bottle: Gram negative rods      Results called to and read back by: Patti VILLAR RN 12/19/2022  12:12    Blood Culture #1 **CANNOT BE ORDERED STAT** [[id number]] Collected: 12/18/22 1213    Order Status: Completed Specimen: Blood from Peripheral, Antecubital, Left Updated: 12/19/22 0145     Blood Culture, Routine No Growth to date    Urine culture [[id number]] Collected: 12/18/22 1241    Order Status: No result Specimen: Urine Updated: 12/18/22 1316        Latest lactate reviewed, they are-  Recent Labs   Lab 12/18/22  1213   LACTATE 1.3       Organ dysfunction indicated by Acute kidney injury  Source- UTI    Source control Achieved by- IV abx      Pyelonephritis  As above    UTI (urinary tract infection)  Hx of ESBL  IV merrem, f/u urine cx      Neurogenic bladder  Urology consulted  No surgical intervention at this time      VTE Risk Mitigation (From admission, onward)         Ordered     IP VTE HIGH RISK PATIENT  Once         12/18/22 1757     Place sequential compression device  Until discontinued         12/18/22 1757                Discharge Planning   ANGELES:      Code Status: Full Code   Is the patient medically ready for discharge?:     Reason for patient still in hospital (select all that apply): Patient trending condition, Laboratory test, Treatment and Consult recommendations  Discharge Plan A: Home with family        Brody Funes MD  Department of Hospital Medicine   [facility]

## 2022-12-20 NOTE — SUBJECTIVE & OBJECTIVE
Interval History: NAEON. Patient reports pain controlled, denies chest pain, SOB, fevers, nausea.    Review of Systems  Objective:     Vital Signs (Most Recent):  Temp: 99.4 °F (37.4 °C) (12/19/22 1611)  Pulse: 96 (12/19/22 1611)  Resp: 18 (12/19/22 1611)  BP: 117/76 (12/19/22 1611)  SpO2: 95 % (12/19/22 1611) Vital Signs (24h Range):  Temp:  [98.3 °F (36.8 °C)-100.1 °F (37.8 °C)] 99.4 °F (37.4 °C)  Pulse:  [] 96  Resp:  [16-18] 18  SpO2:  [95 %-98 %] 95 %  BP: (117-139)/(67-81) 117/76     Weight: 79.1 kg (174 lb 6.1 oz)  Body mass index is 35.22 kg/m².    Intake/Output Summary (Last 24 hours) at 12/19/2022 1824  Last data filed at 12/19/2022 1543  Gross per 24 hour   Intake 240 ml   Output --   Net 240 ml      Physical Exam  Vitals and nursing note reviewed.   Constitutional:       General: She is not in acute distress.     Appearance: Normal appearance. She is normal weight.   Cardiovascular:      Rate and Rhythm: Normal rate and regular rhythm.      Heart sounds: No murmur heard.  Pulmonary:      Effort: Pulmonary effort is normal. No respiratory distress.      Breath sounds: No wheezing.   Neurological:      General: No focal deficit present.      Mental Status: She is alert and oriented to person, place, and time.   Psychiatric:         Mood and Affect: Mood normal.         Behavior: Behavior normal.       Significant Labs: All pertinent labs within the past 24 hours have been reviewed.  Blood Culture:   Recent Labs   Lab 12/18/22  1213 12/18/22  1244   LABBLOO No Growth to date Gram stain tim bottle: Gram negative rods  Results called to and read back by: Patti VILLAR RN 12/19/2022  12:12     CBC:   Recent Labs   Lab 12/18/22  1213 12/19/22  0535   WBC 20.54* 11.54   HGB 10.5* 10.5*   HCT 34.8* 35.5*    349     CMP:   Recent Labs   Lab 12/18/22  1213 12/19/22  0535    139   K 4.2 4.1    112*   CO2 19* 15*   * 158*   BUN 14 14   CREATININE 1.3 1.1   CALCIUM 9.7 8.9   PROT 8.2  --     ALBUMIN 2.7*  --    BILITOT 0.8  --    ALKPHOS 88  --    AST 9*  --    ALT 9*  --    ANIONGAP 14 12     Urine Culture: No results for input(s): LABURIN in the last 48 hours.    Significant Imaging: I have reviewed all pertinent imaging results/findings within the past 24 hours.

## 2022-12-20 NOTE — ASSESSMENT & PLAN NOTE
This patient does have evidence of infective focus  My overall impression is sepsis. Vital signs were reviewed and noted in progress note.  Antibiotics given-   Antibiotics (From admission, onward)    Start     Stop Route Frequency Ordered    12/18/22 1430  meropenem-0.9% sodium chloride 500 mg/50 mL IVPB         -- IV Every 6 hours (non-standard times) 12/18/22 1244        Cultures were taken-   Microbiology Results (last 7 days)     Procedure Component Value Units Date/Time    Blood Culture #2 **CANNOT BE ORDERED STAT** [[id number]]  (Abnormal) Collected: 12/18/22 1244    Order Status: Completed Specimen: Blood from Peripheral, Antecubital, Right Updated: 12/20/22 1403     Blood Culture, Routine Gram stain tim bottle: Gram negative rods      Results called to and read back by: Patti VILLAR RN 12/19/2022  12:12      GRAM NEGATIVE PAUL  Identification and susceptibility pending      Blood Culture #1 **CANNOT BE ORDERED STAT** [[id number]] Collected: 12/18/22 1213    Order Status: Completed Specimen: Blood from Peripheral, Antecubital, Left Updated: 12/19/22 2012     Blood Culture, Routine No Growth to date      No Growth to date    Urine culture [[id number]]  (Abnormal) Collected: 12/18/22 1241    Order Status: Completed Specimen: Urine Updated: 12/19/22 2012     Urine Culture, Routine PRESUMPTIVE E COLI  >100,000 cfu/ml  Identification and susceptibility pending      Narrative:      Specimen Source->Urine        Latest lactate reviewed, they are-  Recent Labs   Lab 12/18/22  1213   LACTATE 1.3       Organ dysfunction indicated by Acute kidney injury  Source- UTI    Source control Achieved by- IV abx

## 2022-12-20 NOTE — ASSESSMENT & PLAN NOTE
This patient does have evidence of infective focus  My overall impression is sepsis. Vital signs were reviewed and noted in progress note.  Antibiotics given-   Antibiotics (From admission, onward)    Start     Stop Route Frequency Ordered    12/18/22 1430  meropenem-0.9% sodium chloride 500 mg/50 mL IVPB         -- IV Every 6 hours (non-standard times) 12/18/22 1244        Cultures were taken-   Microbiology Results (last 7 days)     Procedure Component Value Units Date/Time    Blood Culture #2 **CANNOT BE ORDERED STAT** [[id number]] Collected: 12/18/22 1244    Order Status: Completed Specimen: Blood from Peripheral, Antecubital, Right Updated: 12/19/22 1214     Blood Culture, Routine Gram stain tim bottle: Gram negative rods      Results called to and read back by: Patti VILLAR RN 12/19/2022  12:12    Blood Culture #1 **CANNOT BE ORDERED STAT** [[id number]] Collected: 12/18/22 1213    Order Status: Completed Specimen: Blood from Peripheral, Antecubital, Left Updated: 12/19/22 0145     Blood Culture, Routine No Growth to date    Urine culture [[id number]] Collected: 12/18/22 1241    Order Status: No result Specimen: Urine Updated: 12/18/22 1316        Latest lactate reviewed, they are-  Recent Labs   Lab 12/18/22  1213   LACTATE 1.3       Organ dysfunction indicated by Acute kidney injury  Source- UTI    Source control Achieved by- IV abx

## 2022-12-20 NOTE — SUBJECTIVE & OBJECTIVE
Interval History: NAEON. Patient ambulating in noel, denies fevers, flank pain, dysuria, SOB, chest pain.    Review of Systems   All other systems reviewed and are negative.  Objective:     Vital Signs (Most Recent):  Temp: 98.4 °F (36.9 °C) (12/20/22 1135)  Pulse: 82 (12/20/22 1135)  Resp: 18 (12/20/22 1135)  BP: 136/84 (12/20/22 1135)  SpO2: 98 % (12/20/22 1135) Vital Signs (24h Range):  Temp:  [97.8 °F (36.6 °C)-99.4 °F (37.4 °C)] 98.4 °F (36.9 °C)  Pulse:  [82-99] 82  Resp:  [16-18] 18  SpO2:  [95 %-98 %] 98 %  BP: (117-169)/(67-84) 136/84     Weight: 79.1 kg (174 lb 6.1 oz)  Body mass index is 35.22 kg/m².    Intake/Output Summary (Last 24 hours) at 12/20/2022 1423  Last data filed at 12/19/2022 1835  Gross per 24 hour   Intake 427.25 ml   Output --   Net 427.25 ml      Physical Exam  Vitals and nursing note reviewed.   Constitutional:       General: She is not in acute distress.     Appearance: Normal appearance. She is normal weight.   Cardiovascular:      Rate and Rhythm: Normal rate and regular rhythm.      Heart sounds: No murmur heard.  Pulmonary:      Effort: Pulmonary effort is normal. No respiratory distress.      Breath sounds: No wheezing.   Neurological:      General: No focal deficit present.      Mental Status: She is alert and oriented to person, place, and time.   Psychiatric:         Mood and Affect: Mood normal.         Behavior: Behavior normal.       Significant Labs: All pertinent labs within the past 24 hours have been reviewed.  CBC:   Recent Labs   Lab 12/19/22 0535 12/20/22 0527   WBC 11.54 9.77   HGB 10.5* 9.8*   HCT 35.5* 32.2*    368     CMP:   Recent Labs   Lab 12/19/22 0535 12/20/22 0527    140   K 4.1 4.3   * 110   CO2 15* 21*   * 136*   BUN 14 13   CREATININE 1.1 0.9   CALCIUM 8.9 9.2   ANIONGAP 12 9       Significant Imaging: I have reviewed all pertinent imaging results/findings within the past 24 hours.

## 2022-12-21 PROBLEM — N20.0 NEPHROLITHIASIS: Status: ACTIVE | Noted: 2022-12-21

## 2022-12-21 LAB
ANION GAP SERPL CALC-SCNC: 9 MMOL/L (ref 8–16)
BACTERIA BLD CULT: ABNORMAL
BACTERIA UR CULT: ABNORMAL
BASOPHILS # BLD AUTO: 0.03 K/UL (ref 0–0.2)
BASOPHILS NFR BLD: 0.4 % (ref 0–1.9)
BUN SERPL-MCNC: 10 MG/DL (ref 6–20)
CALCIUM SERPL-MCNC: 9.5 MG/DL (ref 8.7–10.5)
CHLORIDE SERPL-SCNC: 107 MMOL/L (ref 95–110)
CO2 SERPL-SCNC: 23 MMOL/L (ref 23–29)
CREAT SERPL-MCNC: 0.9 MG/DL (ref 0.5–1.4)
DIFFERENTIAL METHOD: ABNORMAL
EOSINOPHIL # BLD AUTO: 0.3 K/UL (ref 0–0.5)
EOSINOPHIL NFR BLD: 3.3 % (ref 0–8)
ERYTHROCYTE [DISTWIDTH] IN BLOOD BY AUTOMATED COUNT: 13.4 % (ref 11.5–14.5)
EST. GFR  (NO RACE VARIABLE): >60 ML/MIN/1.73 M^2
GLUCOSE SERPL-MCNC: 117 MG/DL (ref 70–110)
HCT VFR BLD AUTO: 36.4 % (ref 37–48.5)
HGB BLD-MCNC: 10.8 G/DL (ref 12–16)
IMM GRANULOCYTES # BLD AUTO: 0.04 K/UL (ref 0–0.04)
IMM GRANULOCYTES NFR BLD AUTO: 0.5 % (ref 0–0.5)
LYMPHOCYTES # BLD AUTO: 3.3 K/UL (ref 1–4.8)
LYMPHOCYTES NFR BLD: 39.7 % (ref 18–48)
MCH RBC QN AUTO: 26.3 PG (ref 27–31)
MCHC RBC AUTO-ENTMCNC: 29.7 G/DL (ref 32–36)
MCV RBC AUTO: 89 FL (ref 82–98)
MONOCYTES # BLD AUTO: 0.8 K/UL (ref 0.3–1)
MONOCYTES NFR BLD: 9.6 % (ref 4–15)
NEUTROPHILS # BLD AUTO: 3.8 K/UL (ref 1.8–7.7)
NEUTROPHILS NFR BLD: 46.5 % (ref 38–73)
NRBC BLD-RTO: 0 /100 WBC
PLATELET # BLD AUTO: 465 K/UL (ref 150–450)
PMV BLD AUTO: 8.9 FL (ref 9.2–12.9)
POTASSIUM SERPL-SCNC: 4.1 MMOL/L (ref 3.5–5.1)
RBC # BLD AUTO: 4.1 M/UL (ref 4–5.4)
SODIUM SERPL-SCNC: 139 MMOL/L (ref 136–145)
WBC # BLD AUTO: 8.22 K/UL (ref 3.9–12.7)

## 2022-12-21 PROCEDURE — 25000003 PHARM REV CODE 250: Performed by: STUDENT IN AN ORGANIZED HEALTH CARE EDUCATION/TRAINING PROGRAM

## 2022-12-21 PROCEDURE — 36415 COLL VENOUS BLD VENIPUNCTURE: CPT | Performed by: STUDENT IN AN ORGANIZED HEALTH CARE EDUCATION/TRAINING PROGRAM

## 2022-12-21 PROCEDURE — 99223 1ST HOSP IP/OBS HIGH 75: CPT | Mod: NSCH,,, | Performed by: INTERNAL MEDICINE

## 2022-12-21 PROCEDURE — 63600175 PHARM REV CODE 636 W HCPCS: Performed by: EMERGENCY MEDICINE

## 2022-12-21 PROCEDURE — 80048 BASIC METABOLIC PNL TOTAL CA: CPT | Performed by: STUDENT IN AN ORGANIZED HEALTH CARE EDUCATION/TRAINING PROGRAM

## 2022-12-21 PROCEDURE — 25000003 PHARM REV CODE 250: Performed by: NURSE PRACTITIONER

## 2022-12-21 PROCEDURE — 11000001 HC ACUTE MED/SURG PRIVATE ROOM

## 2022-12-21 PROCEDURE — 36569 INSJ PICC 5 YR+ W/O IMAGING: CPT

## 2022-12-21 PROCEDURE — 99223 PR INITIAL HOSPITAL CARE,LEVL III: ICD-10-PCS | Mod: NSCH,,, | Performed by: INTERNAL MEDICINE

## 2022-12-21 PROCEDURE — 85025 COMPLETE CBC W/AUTO DIFF WBC: CPT | Performed by: STUDENT IN AN ORGANIZED HEALTH CARE EDUCATION/TRAINING PROGRAM

## 2022-12-21 PROCEDURE — C1751 CATH, INF, PER/CENT/MIDLINE: HCPCS

## 2022-12-21 RX ADMIN — MEROPENEM AND SODIUM CHLORIDE 500 MG: 500 INJECTION, SOLUTION INTRAVENOUS at 02:12

## 2022-12-21 RX ADMIN — ACETAMINOPHEN 650 MG: 325 TABLET ORAL at 09:12

## 2022-12-21 RX ADMIN — MEROPENEM AND SODIUM CHLORIDE 500 MG: 500 INJECTION, SOLUTION INTRAVENOUS at 09:12

## 2022-12-21 RX ADMIN — MEROPENEM AND SODIUM CHLORIDE 500 MG: 500 INJECTION, SOLUTION INTRAVENOUS at 03:12

## 2022-12-21 RX ADMIN — BUTALBITAL, ACETAMINOPHEN AND CAFFEINE 1 TABLET: 50; 325; 40 TABLET ORAL at 12:12

## 2022-12-21 NOTE — PLAN OF CARE
Discussed poc with pt, pt verbalized understanding    Purposeful rounding every 2hours    VS wnl      Fall precautions in place, remains injury free                                                         Pain under control with PRN meds          Abx given as prescribed  Bed locked at lowest position  Call light within reach    Chart check complete  Will cont with POC

## 2022-12-21 NOTE — PROGRESS NOTES
[facility] Medicine  Progress Note    Patient Name: Addis Delvalle  MRN: [id number]  Patient Class: IP- Inpatient   Admission Date: 12/18/2022  Length of Stay: 3 days  Attending Physician: Brody Funes MD  Primary Care Provider: Madie Horn MD        Subjective:     Principal Problem:Severe sepsis        HPI:  37 y.o. female with PMH spina bifida, functional solitary kidney, right renal stones, pyelonephritis, multidrug resistant UTIs presents to ED with c/o right flank pain. Patient is afebrile and denies any fevers. Pt states she visited an outpatient clinic and was told she had a UTI and was given antibiotics. Pt also notes that she was told she had kidney stones 3 years ago but never received surgery for it and has not had problems with it since. Denies nausea, vomiting, diarrhea, CP, SOB    Admit for sepsis s/t pyelonephritis      Overview/Hospital Course:  No notes on file    Interval History: NAEON. Patient sitting in bed, denies any new complaints.     Review of Systems   All other systems reviewed and are negative.  Objective:     Vital Signs (Most Recent):  Temp: 98.9 °F (37.2 °C) (12/21/22 1612)  Pulse: 88 (12/21/22 1612)  Resp: 18 (12/21/22 1612)  BP: (!) 120/58 (12/21/22 1612)  SpO2: 96 % (12/21/22 1612)   Vital Signs (24h Range):  Temp:  [98 °F (36.7 °C)-98.9 °F (37.2 °C)] 98.9 °F (37.2 °C)  Pulse:  [73-88] 88  Resp:  [17-18] 18  SpO2:  [96 %-99 %] 96 %  BP: (120-152)/(58-88) 120/58     Weight: 79.1 kg (174 lb 6.1 oz)  Body mass index is 35.22 kg/m².    Intake/Output Summary (Last 24 hours) at 12/21/2022 1621  Last data filed at 12/20/2022 1903  Gross per 24 hour   Intake 307.87 ml   Output --   Net 307.87 ml      Physical Exam  Vitals and nursing note reviewed.   Constitutional:       General: She is not in acute distress.     Appearance: Normal appearance. She is normal weight.   Cardiovascular:      Rate and Rhythm: Normal rate and regular rhythm.      Heart sounds: No murmur  heard.  Pulmonary:      Effort: Pulmonary effort is normal. No respiratory distress.      Breath sounds: No wheezing.   Neurological:      General: No focal deficit present.      Mental Status: She is alert and oriented to person, place, and time.   Psychiatric:         Mood and Affect: Mood normal.         Behavior: Behavior normal.       Significant Labs: All pertinent labs within the past 24 hours have been reviewed.  CBC:   Recent Labs   Lab 12/20/22 0527 12/21/22 0627   WBC 9.77 8.22   HGB 9.8* 10.8*   HCT 32.2* 36.4*    465*     CMP:   Recent Labs   Lab 12/20/22 0527 12/21/22 0627    139   K 4.3 4.1    107   CO2 21* 23   * 117*   BUN 13 10   CREATININE 0.9 0.9   CALCIUM 9.2 9.5   ANIONGAP 9 9       Significant Imaging: I have reviewed all pertinent imaging results/findings within the past 24 hours.      Assessment/Plan:      * Severe sepsis  This patient does have evidence of infective focus  My overall impression is sepsis. Vital signs were reviewed and noted in progress note.  Antibiotics given-   Antibiotics (From admission, onward)    Start     Stop Route Frequency Ordered    12/18/22 1430  meropenem-0.9% sodium chloride 500 mg/50 mL IVPB         -- IV Every 6 hours (non-standard times) 12/18/22 1244        Cultures were taken-   Microbiology Results (last 7 days)     Procedure Component Value Units Date/Time    Blood Culture #2 **CANNOT BE ORDERED STAT** [[id number]]  (Abnormal)  (Susceptibility) Collected: 12/18/22 1244    Order Status: Completed Specimen: Blood from Peripheral, Antecubital, Right Updated: 12/21/22 1242     Blood Culture, Routine Gram stain tim bottle: Gram negative rods      Results called to and read back by: Patti VILLAR RN 12/19/2022  12:12      ESCHERICHIA COLI ESBL    Urine culture [[id number]]  (Abnormal)  (Susceptibility) Collected: 12/18/22 1241    Order Status: Completed Specimen: Urine Updated: 12/21/22 0037     Urine Culture, Routine ESCHERICHIA COLI  ESBL  >100,000 cfu/ml      Narrative:      Specimen Source->Urine    Blood Culture #1 **CANNOT BE ORDERED STAT** [[id number]] Collected: 12/18/22 1213    Order Status: Completed Specimen: Blood from Peripheral, Antecubital, Left Updated: 12/20/22 2012     Blood Culture, Routine No Growth to date      No Growth to date      No Growth to date        Latest lactate reviewed, they are-  No results for input(s): LACTATE in the last 72 hours.    Organ dysfunction indicated by Acute kidney injury  Source- UTI    Source control Achieved by- IV abx    Pyelonephritis  As above    UTI (urinary tract infection)  Hx of ESBL  IV merrem, f/u urine cx - ESBL E. Coli  1 of 2 blood cultures +gram stain      Neurogenic bladder  Urology consulted  No surgical intervention at this time      VTE Risk Mitigation (From admission, onward)         Ordered     IP VTE HIGH RISK PATIENT  Once         12/18/22 1757     Place sequential compression device  Until discontinued         12/18/22 1757                Discharge Planning   ANGELES:      Code Status: Full Code   Is the patient medically ready for discharge?:     Reason for patient still in hospital (select all that apply): Patient trending condition and Consult recommendations  Discharge Plan A: Home with family        Brody Funes MD  Department of Hospital Medicine   [facility]

## 2022-12-21 NOTE — PLAN OF CARE
Remains  injury free. C/O headache, relieved with Fioricet. Self caths through umbilical channel. Receiving IV antibiotics. Awaiting PICC placement for long term antibiotics. No s/s acute distress. Will monitor.

## 2022-12-21 NOTE — ASSESSMENT & PLAN NOTE
This patient does have evidence of infective focus  My overall impression is sepsis. Vital signs were reviewed and noted in progress note.  Antibiotics given-   Antibiotics (From admission, onward)    Start     Stop Route Frequency Ordered    12/18/22 1430  meropenem-0.9% sodium chloride 500 mg/50 mL IVPB         -- IV Every 6 hours (non-standard times) 12/18/22 1244        Cultures were taken-   Microbiology Results (last 7 days)     Procedure Component Value Units Date/Time    Blood Culture #2 **CANNOT BE ORDERED STAT** [[id number]]  (Abnormal)  (Susceptibility) Collected: 12/18/22 1244    Order Status: Completed Specimen: Blood from Peripheral, Antecubital, Right Updated: 12/21/22 1242     Blood Culture, Routine Gram stain tim bottle: Gram negative rods      Results called to and read back by: Patti VLILAR RN 12/19/2022  12:12      ESCHERICHIA COLI ESBL    Urine culture [[id number]]  (Abnormal)  (Susceptibility) Collected: 12/18/22 1241    Order Status: Completed Specimen: Urine Updated: 12/21/22 0037     Urine Culture, Routine ESCHERICHIA COLI ESBL  >100,000 cfu/ml      Narrative:      Specimen Source->Urine    Blood Culture #1 **CANNOT BE ORDERED STAT** [[id number]] Collected: 12/18/22 1213    Order Status: Completed Specimen: Blood from Peripheral, Antecubital, Left Updated: 12/20/22 2012     Blood Culture, Routine No Growth to date      No Growth to date      No Growth to date        Latest lactate reviewed, they are-  No results for input(s): LACTATE in the last 72 hours.    Organ dysfunction indicated by Acute kidney injury  Source- UTI    Source control Achieved by- IV abx

## 2022-12-21 NOTE — SUBJECTIVE & OBJECTIVE
Interval History: NAEON. Patient sitting in bed, denies any new complaints.     Review of Systems   All other systems reviewed and are negative.  Objective:     Vital Signs (Most Recent):  Temp: 98.9 °F (37.2 °C) (12/21/22 1612)  Pulse: 88 (12/21/22 1612)  Resp: 18 (12/21/22 1612)  BP: (!) 120/58 (12/21/22 1612)  SpO2: 96 % (12/21/22 1612)   Vital Signs (24h Range):  Temp:  [98 °F (36.7 °C)-98.9 °F (37.2 °C)] 98.9 °F (37.2 °C)  Pulse:  [73-88] 88  Resp:  [17-18] 18  SpO2:  [96 %-99 %] 96 %  BP: (120-152)/(58-88) 120/58     Weight: 79.1 kg (174 lb 6.1 oz)  Body mass index is 35.22 kg/m².    Intake/Output Summary (Last 24 hours) at 12/21/2022 1621  Last data filed at 12/20/2022 1903  Gross per 24 hour   Intake 307.87 ml   Output --   Net 307.87 ml      Physical Exam  Vitals and nursing note reviewed.   Constitutional:       General: She is not in acute distress.     Appearance: Normal appearance. She is normal weight.   Cardiovascular:      Rate and Rhythm: Normal rate and regular rhythm.      Heart sounds: No murmur heard.  Pulmonary:      Effort: Pulmonary effort is normal. No respiratory distress.      Breath sounds: No wheezing.   Neurological:      General: No focal deficit present.      Mental Status: She is alert and oriented to person, place, and time.   Psychiatric:         Mood and Affect: Mood normal.         Behavior: Behavior normal.       Significant Labs: All pertinent labs within the past 24 hours have been reviewed.  CBC:   Recent Labs   Lab 12/20/22 0527 12/21/22  0627   WBC 9.77 8.22   HGB 9.8* 10.8*   HCT 32.2* 36.4*    465*     CMP:   Recent Labs   Lab 12/20/22 0527 12/21/22  0627    139   K 4.3 4.1    107   CO2 21* 23   * 117*   BUN 13 10   CREATININE 0.9 0.9   CALCIUM 9.2 9.5   ANIONGAP 9 9       Significant Imaging: I have reviewed all pertinent imaging results/findings within the past 24 hours.

## 2022-12-22 ENCOUNTER — TELEPHONE (OUTPATIENT)
Dept: FAMILY MEDICINE | Facility: CLINIC | Age: 37
End: 2022-12-22

## 2022-12-22 VITALS
SYSTOLIC BLOOD PRESSURE: 138 MMHG | RESPIRATION RATE: 18 BRPM | WEIGHT: 174.38 LBS | TEMPERATURE: 98 F | BODY MASS INDEX: 35.16 KG/M2 | DIASTOLIC BLOOD PRESSURE: 77 MMHG | OXYGEN SATURATION: 96 % | HEART RATE: 79 BPM | HEIGHT: 59 IN

## 2022-12-22 PROBLEM — A41.9 SEVERE SEPSIS: Status: RESOLVED | Noted: 2022-12-18 | Resolved: 2022-12-22

## 2022-12-22 PROBLEM — R65.20 SEVERE SEPSIS: Status: RESOLVED | Noted: 2022-12-18 | Resolved: 2022-12-22

## 2022-12-22 LAB
ANION GAP SERPL CALC-SCNC: 11 MMOL/L (ref 8–16)
BASOPHILS # BLD AUTO: 0.05 K/UL (ref 0–0.2)
BASOPHILS NFR BLD: 0.6 % (ref 0–1.9)
BUN SERPL-MCNC: 14 MG/DL (ref 6–20)
CALCIUM SERPL-MCNC: 9.3 MG/DL (ref 8.7–10.5)
CHLORIDE SERPL-SCNC: 107 MMOL/L (ref 95–110)
CO2 SERPL-SCNC: 21 MMOL/L (ref 23–29)
CREAT SERPL-MCNC: 0.8 MG/DL (ref 0.5–1.4)
DIFFERENTIAL METHOD: ABNORMAL
EOSINOPHIL # BLD AUTO: 0.2 K/UL (ref 0–0.5)
EOSINOPHIL NFR BLD: 2.7 % (ref 0–8)
ERYTHROCYTE [DISTWIDTH] IN BLOOD BY AUTOMATED COUNT: 13.4 % (ref 11.5–14.5)
EST. GFR  (NO RACE VARIABLE): >60 ML/MIN/1.73 M^2
GLUCOSE SERPL-MCNC: 115 MG/DL (ref 70–110)
HCT VFR BLD AUTO: 33.7 % (ref 37–48.5)
HGB BLD-MCNC: 10.1 G/DL (ref 12–16)
IMM GRANULOCYTES # BLD AUTO: 0.04 K/UL (ref 0–0.04)
IMM GRANULOCYTES NFR BLD AUTO: 0.5 % (ref 0–0.5)
LYMPHOCYTES # BLD AUTO: 2.6 K/UL (ref 1–4.8)
LYMPHOCYTES NFR BLD: 32.4 % (ref 18–48)
MCH RBC QN AUTO: 26.6 PG (ref 27–31)
MCHC RBC AUTO-ENTMCNC: 30 G/DL (ref 32–36)
MCV RBC AUTO: 89 FL (ref 82–98)
MONOCYTES # BLD AUTO: 0.7 K/UL (ref 0.3–1)
MONOCYTES NFR BLD: 8.5 % (ref 4–15)
NEUTROPHILS # BLD AUTO: 4.5 K/UL (ref 1.8–7.7)
NEUTROPHILS NFR BLD: 55.3 % (ref 38–73)
NRBC BLD-RTO: 0 /100 WBC
PLATELET # BLD AUTO: 402 K/UL (ref 150–450)
PMV BLD AUTO: 8.6 FL (ref 9.2–12.9)
POTASSIUM SERPL-SCNC: 4.4 MMOL/L (ref 3.5–5.1)
RBC # BLD AUTO: 3.79 M/UL (ref 4–5.4)
SODIUM SERPL-SCNC: 139 MMOL/L (ref 136–145)
WBC # BLD AUTO: 8.11 K/UL (ref 3.9–12.7)

## 2022-12-22 PROCEDURE — 87040 BLOOD CULTURE FOR BACTERIA: CPT | Performed by: INTERNAL MEDICINE

## 2022-12-22 PROCEDURE — 63600175 PHARM REV CODE 636 W HCPCS: Performed by: HOSPITALIST

## 2022-12-22 PROCEDURE — 85025 COMPLETE CBC W/AUTO DIFF WBC: CPT | Performed by: STUDENT IN AN ORGANIZED HEALTH CARE EDUCATION/TRAINING PROGRAM

## 2022-12-22 PROCEDURE — 80048 BASIC METABOLIC PNL TOTAL CA: CPT | Performed by: STUDENT IN AN ORGANIZED HEALTH CARE EDUCATION/TRAINING PROGRAM

## 2022-12-22 PROCEDURE — 25000003 PHARM REV CODE 250: Performed by: NURSE PRACTITIONER

## 2022-12-22 RX ORDER — MUPIROCIN 20 MG/G
OINTMENT TOPICAL 2 TIMES DAILY
Status: DISCONTINUED | OUTPATIENT
Start: 2022-12-22 | End: 2022-12-22 | Stop reason: HOSPADM

## 2022-12-22 RX ADMIN — BUTALBITAL, ACETAMINOPHEN AND CAFFEINE 1 TABLET: 50; 325; 40 TABLET ORAL at 06:12

## 2022-12-22 RX ADMIN — ERTAPENEM 1 G: 1 INJECTION INTRAMUSCULAR; INTRAVENOUS at 06:12

## 2022-12-22 NOTE — PLAN OF CARE
Remains injury free. PICC clean,dry,intact. Patient to discharge to home with PICC for IV antibiotics. Stable for discharge.

## 2022-12-22 NOTE — HPI
37 year old woman  with history of  spina bifida, functional solitary kidney, recurrent UTI ,kidney stones who was admitted with right flank pain .  Labs and imaging test reviewed-     CT renal stone -12/18-New right basilar atelectasis or airspace opacity     Right renal large staghorn calculi and mild hydroureteronephrosis with new mild perinephric stranding and increasing shoddy lymph nodes  Blood cultures -12/18- ESBL E coli   Urine culture -12/18  Component      Latest Ref Rng & Units 12/19/2022 12/18/2022   WBC      3.90 - 12.70 K/uL 11.54 20.54 (H)

## 2022-12-22 NOTE — ASSESSMENT & PLAN NOTE
This patient did have evidence of infective focus  My overall impression is sepsis. Vital signs were reviewed and noted in progress note.  Antibiotics given-   Antibiotics (From admission, onward)    Start     Stop Route Frequency Ordered    12/23/22 0600  ertapenem (INVANZ) 1 g in sodium chloride 0.9 % 100 mL IVPB (MB+)         -- IV Every 24 hours (non-standard times) 12/22/22 0926    12/22/22 2100  mupirocin 2 % ointment         12/27 2059 Nasl 2 times daily 12/22/22 1224        Cultures were taken-   Microbiology Results (last 7 days)     Procedure Component Value Units Date/Time    Blood culture [[id number]] Collected: 12/22/22 0605    Order Status: Sent Specimen: Blood Updated: 12/22/22 1522    Blood culture [[id number]] Collected: 12/22/22 0605    Order Status: Sent Specimen: Blood Updated: 12/22/22 1340    Blood Culture #1 **CANNOT BE ORDERED STAT** [[id number]] Collected: 12/18/22 1213    Order Status: Completed Specimen: Blood from Peripheral, Antecubital, Left Updated: 12/21/22 2012     Blood Culture, Routine No Growth to date      No Growth to date      No Growth to date      No Growth to date    Blood Culture #2 **CANNOT BE ORDERED STAT** [[id number]]  (Abnormal)  (Susceptibility) Collected: 12/18/22 1244    Order Status: Completed Specimen: Blood from Peripheral, Antecubital, Right Updated: 12/21/22 1242     Blood Culture, Routine Gram stain tim bottle: Gram negative rods      Results called to and read back by: Patti VILLAR RN 12/19/2022  12:12      ESCHERICHIA COLI ESBL    Urine culture [[id number]]  (Abnormal)  (Susceptibility) Collected: 12/18/22 1241    Order Status: Completed Specimen: Urine Updated: 12/21/22 0037     Urine Culture, Routine ESCHERICHIA COLI ESBL  >100,000 cfu/ml      Narrative:      Specimen Source->Urine        Latest lactate reviewed, they are-  No results for input(s): LACTATE in the last 72 hours.    Organ dysfunction indicated by Acute kidney injury  Source- UTI due to ESBL E.  Coli    Source control Achieved by- IV abx    Infectious Disease consulted, recommended 14 day course of ertapenem 1 g IV q day.   consulted to arrange home IV antibiotics  RUE PICC line in place

## 2022-12-22 NOTE — CONSULTS
Patient has a recently positive culture with a multi-drug resistant microorganism.  Please adhere to standard and contact precautions to include:  1. Donning and doffing of PPE (gown and gloves) upon entering and exiting the patient care environment.   2. Use of dedicated equipment for this patient, when possible  3. Following standard hand hygiene practices  4. Enhanced environmental surface cleaning

## 2022-12-22 NOTE — HOSPITAL COURSE
Patient was admitted, started on IV merrem due to hx of recurrent MDR UTIs  Urology was consulted for right renal stones, pyelonephritis  Patient's condition improved with IV antibiotics  No signs of obsturction noted per Urology  Hx of MDR UTIs    Urology recommended outpatient follow up  Infectious Disease consulted, recommended 14 day course of ertapenem 1 g IV q day.   consulted to arrange home IV antibiotics  RUE PICC line in place  Stable for hospital discharge

## 2022-12-22 NOTE — DISCHARGE SUMMARY
[facility] Medicine  Discharge Summary      Patient Name: Addis Delvalle  MRN: [id number]  TRACEY: [id number]  Patient Class: IP- Inpatient  Admission Date: 12/18/2022  Hospital Length of Stay: 4 days  Discharge Date and Time:  12/22/2022 4:10 PM  Attending Physician: Brody Funes MD   Discharging Provider: Brody Funes MD  Primary Care Provider: Madie Horn MD    Primary Care Team: Networked reference to record PCT     HPI:   37 y.o. female with PMH spina bifida, functional solitary kidney, right renal stones, pyelonephritis, multidrug resistant UTIs presents to ED with c/o right flank pain. Patient is afebrile and denies any fevers. Pt states she visited an outpatient clinic and was told she had a UTI and was given antibiotics. Pt also notes that she was told she had kidney stones 3 years ago but never received surgery for it and has not had problems with it since. Denies nausea, vomiting, diarrhea, CP, SOB    Admit for sepsis s/t pyelonephritis      * No surgery found *      Hospital Course:   Patient was admitted, started on IV merrem due to hx of recurrent MDR UTIs  Urology was consulted for right renal stones, pyelonephritis  Patient's condition improved with IV antibiotics  No signs of obsturction noted per Urology  Hx of MDR UTIs    Urology recommended outpatient follow up  Infectious Disease consulted, recommended 14 day course of ertapenem 1 g IV q day.   consulted to arrange home IV antibiotics  RUE PICC line in place  Stable for hospital discharge       Goals of Care Treatment Preferences:  Code Status: Full Code      Consults:   Consults (From admission, onward)        Status Ordering Provider     Inpatient consult to Social Work  Once        Provider:  (Not yet assigned)    Completed FUNES, BRODY     Inpatient consult to PICC team (NIAS)  Once        Provider:  (Not yet assigned)    Acknowledged FUNES, BRODY     Inpatient consult to Infectious Diseases  Once         Provider:  Lui English MD, FINESSE Almaraz     Inpatient consult to Urology  Once        Provider:  (Not yet assigned)    Completed KARLY FELICIANO          * Severe sepsis  This patient did have evidence of infective focus  My overall impression is sepsis. Vital signs were reviewed and noted in progress note.  Antibiotics given-   Antibiotics (From admission, onward)    Start     Stop Route Frequency Ordered    12/23/22 0600  ertapenem (INVANZ) 1 g in sodium chloride 0.9 % 100 mL IVPB (MB+)         -- IV Every 24 hours (non-standard times) 12/22/22 0926    12/22/22 2100  mupirocin 2 % ointment         12/27 2059 Nasl 2 times daily 12/22/22 1224        Cultures were taken-   Microbiology Results (last 7 days)     Procedure Component Value Units Date/Time    Blood culture [[id number]] Collected: 12/22/22 0605    Order Status: Sent Specimen: Blood Updated: 12/22/22 1522    Blood culture [[id number]] Collected: 12/22/22 0605    Order Status: Sent Specimen: Blood Updated: 12/22/22 1340    Blood Culture #1 **CANNOT BE ORDERED STAT** [[id number]] Collected: 12/18/22 1213    Order Status: Completed Specimen: Blood from Peripheral, Antecubital, Left Updated: 12/21/22 2012     Blood Culture, Routine No Growth to date      No Growth to date      No Growth to date      No Growth to date    Blood Culture #2 **CANNOT BE ORDERED STAT** [[id number]]  (Abnormal)  (Susceptibility) Collected: 12/18/22 1244    Order Status: Completed Specimen: Blood from Peripheral, Antecubital, Right Updated: 12/21/22 1242     Blood Culture, Routine Gram stain tim bottle: Gram negative rods      Results called to and read back by: Patti VILLAR RN 12/19/2022  12:12      ESCHERICHIA COLI ESBL    Urine culture [[id number]]  (Abnormal)  (Susceptibility) Collected: 12/18/22 1241    Order Status: Completed Specimen: Urine Updated: 12/21/22 0037     Urine Culture, Routine ESCHERICHIA COLI ESBL  >100,000 cfu/ml      Narrative:      Specimen  Source->Urine        Latest lactate reviewed, they are-  No results for input(s): LACTATE in the last 72 hours.    Organ dysfunction indicated by Acute kidney injury  Source- UTI due to ESBL E. Coli    Source control Achieved by- IV abx    Infectious Disease consulted, recommended 14 day course of ertapenem 1 g IV q day.   consulted to arrange home IV antibiotics  RUE PICC line in place    Nephrolithiasis  Urology was consulted for right renal stones, pyelonephritis  Patient's condition improved with IV antibiotics  No signs of obsturction noted per Urology  Hx of MDR UTIs  Urology recommended outpatient follow up       Pyelonephritis  As above    UTI (urinary tract infection)  Hx of ESBL  IV merrem, f/u urine cx - ESBL E. Coli  1 of 2 blood cultures +ESBL E. Coli      Neurogenic bladder  Urology consulted  No surgical intervention at this time      Final Active Diagnoses:    Diagnosis Date Noted POA    PRINCIPAL PROBLEM:  Severe sepsis [A41.9, R65.20] 12/18/2022 Yes    Nephrolithiasis [N20.0] 12/21/2022 Yes    Pyelonephritis [N12] 01/12/2020 Yes    UTI (urinary tract infection) [N39.0] 01/11/2020 Yes    Neurogenic bladder [N31.9] 01/10/2020 Yes      Problems Resolved During this Admission:       Discharged Condition: stable    Disposition: Home or Self Care    Follow Up:   Follow-up Information     Madie Horn MD Follow up.    Specialty: Family Medicine  Contact information:  [address] [address] [address] [phone]             Lui Guillory MD. Schedule an appointment as soon as possible for a visit in 1 month(s).    Specialty: Urology  Contact information:  [address] [facility] [place] [facility]  [facility] [address] [phone]                       Patient Instructions:      Activity as tolerated       Pending Diagnostic Studies:     None         Medications:  Reconciled Home Medications:      Medication List      START taking these medications     sodium chloride 0.9 % PgBk 100 mL with ertapenem 1 gram SolR 1 g  Inject 1 g into the vein once daily. for 14 days        CONTINUE taking these medications    ISIBLOOM 0.15-0.03 mg per tablet  Generic drug: desogestreL-ethinyl estradioL  TAKE 1 TABLET DAILY FOR BIRTH CONTROL        STOP taking these medications    fosfomycin 3 gram Pack  Commonly known as: MONUROL     nitrofurantoin (macrocrystal-monohydrate) 100 MG capsule  Commonly known as: MACROBID            Indwelling Lines/Drains at time of discharge:   Lines/Drains/Airways     Peripherally Inserted Central Catheter Line  Duration           PICC Double Lumen 12/21/22 2035 right basilic <1 day          Drain  Duration                Suprapubic Catheter -- days         Suprapubic Catheter -- days                Time spent on the discharge of patient: > 30 minutes         Brody Funes MD  Department of Hospital Medicine  [facility]

## 2022-12-22 NOTE — TELEPHONE ENCOUNTER
Left message will call back in morning to determine if she will come in for appt on 12/23/22 with Dr. Acosta ; still in hospital

## 2022-12-22 NOTE — PLAN OF CARE
Pt is still pending teaching from [facility].    Freddy Narayanan LMSW 12/22/2022 3:57 PM

## 2022-12-22 NOTE — PROCEDURES
"Addis Delvalle is a 37 y.o. female patient.    Temp: 99.4 °F (37.4 °C) (12/21/22 1914)  Pulse: 91 (12/21/22 1914)  Resp: 17 (12/21/22 1914)  BP: 125/80 (12/21/22 1914)  SpO2: 99 % (12/21/22 1914)  Weight: 79.1 kg (174 lb 6.1 oz) (12/19/22 0742)  Height: 4' 11" (149.9 cm) (12/19/22 0742)    PICC  Performed by: Terry Camacho RN  Supervising provider: Terry Camacho RN  Consent Done: Yes  Time out: Immediately prior to procedure a time out was called to verify the correct patient, procedure, equipment, support staff and site/side marked as required  Indications: med administration  Anesthesia: local infiltration  Local anesthetic: lidocaine 1% without epinephrine  Anesthetic Total (mL): 3  Preparation: skin prepped with ChloraPrep  Skin prep agent dried: skin prep agent completely dried prior to procedure  Sterile barriers: all five maximum sterile barriers used - cap, mask, sterile gown, sterile gloves, and large sterile sheet  Hand hygiene: hand hygiene performed prior to central venous catheter insertion  Location details: right basilic  Catheter type: double lumen  Catheter size: 4 Fr  Catheter Length: 37cm    Ultrasound guidance: yes  Vessel Caliber: small and patent, compressibility normal  Vascular Doppler: not done  Needle advanced into vessel with real time Ultrasound guidance.  Guidewire confirmed in vessel.  Sterile sheath used.  no esophageal manometryNumber of attempts: 1  Post-procedure: blood return through all ports, chlorhexidine patch and sterile dressing applied  Estimated blood loss (mL): 0  Specimens: No  Implants: No        Name MARYAN Stewart  12/21/2022    "

## 2022-12-22 NOTE — ASSESSMENT & PLAN NOTE
Urology was consulted for right renal stones, pyelonephritis  Patient's condition improved with IV antibiotics  No signs of obsturction noted per Urology  Hx of MDR UTIs  Urology recommended outpatient follow up

## 2022-12-22 NOTE — PLAN OF CARE
Problem: Adult Inpatient Plan of Care  Goal: Plan of Care Review  Outcome: Ongoing, Progressing  Goal: Patient-Specific Goal (Individualized)  Outcome: Ongoing, Progressing  Goal: Absence of Hospital-Acquired Illness or Injury  Outcome: Ongoing, Progressing  Goal: Optimal Comfort and Wellbeing  Outcome: Ongoing, Progressing  Goal: Readiness for Transition of Care  Outcome: Ongoing, Progressing     Problem: Adjustment to Illness (Sepsis/Septic Shock)  Goal: Optimal Coping  Outcome: Ongoing, Progressing     Problem: Bleeding (Sepsis/Septic Shock)  Goal: Absence of Bleeding  Outcome: Ongoing, Progressing     Problem: Glycemic Control Impaired (Sepsis/Septic Shock)  Goal: Blood Glucose Level Within Desired Range  Outcome: Ongoing, Progressing     Problem: Infection Progression (Sepsis/Septic Shock)  Goal: Absence of Infection Signs and Symptoms  Outcome: Ongoing, Progressing     Problem: Nutrition Impaired (Sepsis/Septic Shock)  Goal: Optimal Nutrition Intake  Outcome: Ongoing, Progressing     Problem: Pain Acute  Goal: Acceptable Pain Control and Functional Ability  Outcome: Ongoing, Progressing     Problem: Infection  Goal: Absence of Infection Signs and Symptoms  Outcome: Ongoing, Progressing

## 2022-12-22 NOTE — ASSESSMENT & PLAN NOTE
CT scan of the kidney showed -   Right renal large staghorn calculi and mild hydroureteronephrosis with new mild perinephric stranding and increasing shoddy lymph nodes    Will need urology -this will always be a source of recurrent infection

## 2022-12-22 NOTE — TELEPHONE ENCOUNTER
----- Message from Malena Bruner sent at 12/22/2022  3:34 PM CST -----  Contact: Addis Greer returned call, she is still in the hospital and will call back tomorrow morning.       Thanks  TS

## 2022-12-22 NOTE — CONSULTS
[facility]  Infectious Disease  Consult Note    Patient Name: Addis Delvalle  MRN: [id number]  Admission Date: 12/18/2022  Hospital Length of Stay: 3 days  Attending Physician: Brody Funes MD  Primary Care Provider: Madie Horn MD     Isolation Status: No active isolations    Patient information was obtained from patient, past medical records and ER records.      Consults  Assessment/Plan:     * Severe sepsis  Related to UTI and Bacteremia from ESBL E coli - will continue Ertapenem, follow repeat blood culture   Will need to treat for 2 weeks from negative blood culture with Ertapenem       Nephrolithiasis  CT scan of the kidney showed -   Right renal large staghorn calculi and mild hydroureteronephrosis with new mild perinephric stranding and increasing shoddy lymph nodes    Will need urology -this will always be a source of recurrent infection     Neurogenic bladder  Will follow urology         Thank you for your consult. I will follow-up with patient. Please contact us if you have any additional questions.    Lui English MD, [facility]  Infectious Disease  [facility] - Med Surg    Subjective:     Principal Problem: Severe sepsis    HPI:    37 year old woman  with history of  spina bifida, functional solitary kidney, recurrent UTI ,kidney stones who was admitted with right flank pain .  Labs and imaging test reviewed-     CT renal stone -12/18-New right basilar atelectasis or airspace opacity     Right renal large staghorn calculi and mild hydroureteronephrosis with new mild perinephric stranding and increasing shoddy lymph nodes  Blood cultures -12/18- ESBL E coli   Urine culture -12/18  Component      Latest Ref Rng & Units 12/19/2022 12/18/2022   WBC      3.90 - 12.70 K/uL 11.54 20.54 (H)       Past Medical History:   Diagnosis Date    Spina bifida     Spina bifida        Past Surgical History:   Procedure Laterality Date    BLADDER AUGMENTATION      CSF SHUNT      INSERTION OF SUPRAPUBIC CATHETER   1990       Review of patient's allergies indicates:   Allergen Reactions    Cefaclor     Cephalexin     Meperidine      vomiting    Promethazine        Medications:  Medications Prior to Admission   Medication Sig    ISIBLOOM 0.15-0.03 mg per tablet TAKE 1 TABLET DAILY FOR BIRTH CONTROL    nitrofurantoin, macrocrystal-monohydrate, (MACROBID) 100 MG capsule Take 1 capsule (100 mg total) by mouth 2 (two) times daily.    fosfomycin (MONUROL) 3 gram Pack Take 3 g by mouth once.     Antibiotics (From admission, onward)      Start     Stop Route Frequency Ordered    12/21/22 2230  ertapenem (INVANZ) 1 g in sodium chloride 0.9 % 100 mL IVPB (ready to mix system)         -- IV Every 24 hours (non-standard times) 12/21/22 2127          Antifungals (From admission, onward)      None          Antivirals (From admission, onward)      None             Immunization History   Administered Date(s) Administered    COVID-19, MRNA, LN-S, PF (MODERNA FULL 0.5 ML DOSE) 08/07/2021, 09/06/2021    Tdap 05/18/2022       Family History       Problem Relation (Age of Onset)    Diabetes Father, Paternal Grandmother    Hyperlipidemia Mother, Father    Hypertension Mother          Social History     Socioeconomic History    Marital status: Single   Tobacco Use    Smoking status: Never    Smokeless tobacco: Never   Substance and Sexual Activity    Alcohol use: Yes     Comment: Occaisonally    Drug use: Never    Sexual activity: Never     Review of Systems   Constitutional:  Positive for activity change and appetite change. Negative for chills.   HENT:  Negative for congestion.    Cardiovascular:  Negative for chest pain.   Skin:  Negative for color change and pallor.   Objective:     Vital Signs (Most Recent):  Temp: 99.4 °F (37.4 °C) (12/21/22 1914)  Pulse: 91 (12/21/22 1914)  Resp: 17 (12/21/22 1914)  BP: 125/80 (12/21/22 1914)  SpO2: 99 % (12/21/22 1914) Vital Signs (24h Range):  Temp:  [98 °F (36.7 °C)-99.4 °F (37.4 °C)] 99.4  °F (37.4 °C)  Pulse:  [73-91] 91  Resp:  [17-18] 17  SpO2:  [96 %-99 %] 99 %  BP: (120-152)/(58-88) 125/80     Weight: 79.1 kg (174 lb 6.1 oz)  Body mass index is 35.22 kg/m².    Estimated Creatinine Clearance: 82.3 mL/min (based on SCr of 0.9 mg/dL).    Physical Exam  Vitals and nursing note reviewed.   Constitutional:       General: She is not in acute distress.     Appearance: Normal appearance. She is normal weight.   Cardiovascular:      Rate and Rhythm: Normal rate and regular rhythm.      Heart sounds: No murmur heard.  Pulmonary:      Effort: Pulmonary effort is normal. No respiratory distress.      Breath sounds: No wheezing.   Neurological:      General: No focal deficit present.      Mental Status: She is alert and oriented to person, place, and time.   Psychiatric:         Mood and Affect: Mood normal.         Behavior: Behavior normal.       Significant Labs: Blood Culture:   Recent Labs   Lab 12/18/22  1213 12/18/22  1244   LABBLOO No Growth to date  No Growth to date  No Growth to date  No Growth to date Gram stain tim bottle: Gram negative rods  Results called to and read back by: Patti VILLAR RN 12/19/2022  12:12  ESCHERICHIA COLI ESBL*     BMP:   Recent Labs   Lab 12/21/22  0627   *      K 4.1      CO2 23   BUN 10   CREATININE 0.9   CALCIUM 9.5     CBC:   Recent Labs   Lab 12/20/22  0527 12/21/22  0627   WBC 9.77 8.22   HGB 9.8* 10.8*   HCT 32.2* 36.4*    465*     CMP:   Recent Labs   Lab 12/20/22  0527 12/21/22  0627    139   K 4.3 4.1    107   CO2 21* 23   * 117*   BUN 13 10   CREATININE 0.9 0.9   CALCIUM 9.2 9.5   ANIONGAP 9 9     Urine Culture:   Recent Labs   Lab 12/18/22  1241   LABURIN ESCHERICHIA COLI ESBL  >100,000 cfu/ml  *     Urine Studies:   Recent Labs   Lab 12/18/22  1241   COLORU Yellow   APPEARANCEUA Cloudy*   PHUR 6.0   SPECGRAV 1.010   PROTEINUA 2+*   GLUCUA Negative   KETONESU Negative   BILIRUBINUA Negative   OCCULTUA 2+*   NITRITE  Positive*   UROBILINOGEN Negative   LEUKOCYTESUR 3+*   RBCUA 37*   WBCUA >100*   BACTERIA Many*   SQUAMEPITHEL 2   HYALINECASTS 0     Wound Culture: No results for input(s): LABAERO in the last 4320 hours.  All pertinent labs within the past 24 hours have been reviewed.    Significant Imaging: I have reviewed all pertinent imaging results/findings within the past 24 hours.

## 2022-12-22 NOTE — SUBJECTIVE & OBJECTIVE
Past Medical History:   Diagnosis Date    Spina bifida     Spina bifida        Past Surgical History:   Procedure Laterality Date    BLADDER AUGMENTATION      CSF SHUNT      INSERTION OF SUPRAPUBIC CATHETER  1990       Review of patient's allergies indicates:   Allergen Reactions    Cefaclor     Cephalexin     Meperidine      vomiting    Promethazine        Medications:  Medications Prior to Admission   Medication Sig    ISIBLOOM 0.15-0.03 mg per tablet TAKE 1 TABLET DAILY FOR BIRTH CONTROL    nitrofurantoin, macrocrystal-monohydrate, (MACROBID) 100 MG capsule Take 1 capsule (100 mg total) by mouth 2 (two) times daily.    fosfomycin (MONUROL) 3 gram Pack Take 3 g by mouth once.     Antibiotics (From admission, onward)      Start     Stop Route Frequency Ordered    12/21/22 2230  ertapenem (INVANZ) 1 g in sodium chloride 0.9 % 100 mL IVPB (ready to mix system)         -- IV Every 24 hours (non-standard times) 12/21/22 2127          Antifungals (From admission, onward)      None          Antivirals (From admission, onward)      None             Immunization History   Administered Date(s) Administered    COVID-19, MRNA, LN-S, PF (MODERNA FULL 0.5 ML DOSE) 08/07/2021, 09/06/2021    Tdap 05/18/2022       Family History       Problem Relation (Age of Onset)    Diabetes Father, Paternal Grandmother    Hyperlipidemia Mother, Father    Hypertension Mother          Social History     Socioeconomic History    Marital status: Single   Tobacco Use    Smoking status: Never    Smokeless tobacco: Never   Substance and Sexual Activity    Alcohol use: Yes     Comment: Occaisonally    Drug use: Never    Sexual activity: Never     Review of Systems   Constitutional:  Positive for activity change and appetite change. Negative for chills.   HENT:  Negative for congestion.    Cardiovascular:  Negative for chest pain.   Skin:  Negative for color change and pallor.   Objective:     Vital Signs (Most Recent):  Temp: 99.4 °F (37.4 °C)  (12/21/22 1914)  Pulse: 91 (12/21/22 1914)  Resp: 17 (12/21/22 1914)  BP: 125/80 (12/21/22 1914)  SpO2: 99 % (12/21/22 1914) Vital Signs (24h Range):  Temp:  [98 °F (36.7 °C)-99.4 °F (37.4 °C)] 99.4 °F (37.4 °C)  Pulse:  [73-91] 91  Resp:  [17-18] 17  SpO2:  [96 %-99 %] 99 %  BP: (120-152)/(58-88) 125/80     Weight: 79.1 kg (174 lb 6.1 oz)  Body mass index is 35.22 kg/m².    Estimated Creatinine Clearance: 82.3 mL/min (based on SCr of 0.9 mg/dL).    Physical Exam  Vitals and nursing note reviewed.   Constitutional:       General: She is not in acute distress.     Appearance: Normal appearance. She is normal weight.   Cardiovascular:      Rate and Rhythm: Normal rate and regular rhythm.      Heart sounds: No murmur heard.  Pulmonary:      Effort: Pulmonary effort is normal. No respiratory distress.      Breath sounds: No wheezing.   Neurological:      General: No focal deficit present.      Mental Status: She is alert and oriented to person, place, and time.   Psychiatric:         Mood and Affect: Mood normal.         Behavior: Behavior normal.       Significant Labs: Blood Culture:   Recent Labs   Lab 12/18/22  1213 12/18/22  1244   LABBLOO No Growth to date  No Growth to date  No Growth to date  No Growth to date Gram stain tim bottle: Gram negative rods  Results called to and read back by: Patti VILLAR RN 12/19/2022  12:12  ESCHERICHIA COLI ESBL*     BMP:   Recent Labs   Lab 12/21/22  0627   *      K 4.1      CO2 23   BUN 10   CREATININE 0.9   CALCIUM 9.5     CBC:   Recent Labs   Lab 12/20/22  0527 12/21/22  0627   WBC 9.77 8.22   HGB 9.8* 10.8*   HCT 32.2* 36.4*    465*     CMP:   Recent Labs   Lab 12/20/22  0527 12/21/22  0627    139   K 4.3 4.1    107   CO2 21* 23   * 117*   BUN 13 10   CREATININE 0.9 0.9   CALCIUM 9.2 9.5   ANIONGAP 9 9     Urine Culture:   Recent Labs   Lab 12/18/22  1241   LABURIN ESCHERICHIA COLI ESBL  >100,000 cfu/ml  *     Urine Studies:    Recent Labs   Lab 12/18/22  1241   COLORU Yellow   APPEARANCEUA Cloudy*   PHUR 6.0   SPECGRAV 1.010   PROTEINUA 2+*   GLUCUA Negative   KETONESU Negative   BILIRUBINUA Negative   OCCULTUA 2+*   NITRITE Positive*   UROBILINOGEN Negative   LEUKOCYTESUR 3+*   RBCUA 37*   WBCUA >100*   BACTERIA Many*   SQUAMEPITHEL 2   HYALINECASTS 0     Wound Culture: No results for input(s): LABAERO in the last 4320 hours.  All pertinent labs within the past 24 hours have been reviewed.    Significant Imaging: I have reviewed all pertinent imaging results/findings within the past 24 hours.

## 2022-12-22 NOTE — ASSESSMENT & PLAN NOTE
Related to UTI and Bacteremia from ESBL E coli - will continue Ertapenem, follow repeat blood culture   Will need to treat for 2 weeks from negative blood culture with Ertapenem

## 2022-12-23 ENCOUNTER — OFFICE VISIT (OUTPATIENT)
Dept: FAMILY MEDICINE | Facility: CLINIC | Age: 37
DRG: 698 | End: 2022-12-23
Payer: MEDICAID

## 2022-12-23 VITALS
OXYGEN SATURATION: 98 % | TEMPERATURE: 98 F | DIASTOLIC BLOOD PRESSURE: 84 MMHG | BODY MASS INDEX: 34.33 KG/M2 | SYSTOLIC BLOOD PRESSURE: 138 MMHG | HEIGHT: 59 IN | WEIGHT: 170.31 LBS | HEART RATE: 88 BPM

## 2022-12-23 DIAGNOSIS — Z09 HOSPITAL DISCHARGE FOLLOW-UP: Primary | ICD-10-CM

## 2022-12-23 DIAGNOSIS — N20.0 STAGHORN CALCULUS: ICD-10-CM

## 2022-12-23 DIAGNOSIS — L21.9 SEBORRHEA: ICD-10-CM

## 2022-12-23 DIAGNOSIS — Z16.12 ESBL (EXTENDED SPECTRUM BETA-LACTAMASE) PRODUCING BACTERIA INFECTION: ICD-10-CM

## 2022-12-23 DIAGNOSIS — G47.00 INSOMNIA, UNSPECIFIED TYPE: ICD-10-CM

## 2022-12-23 DIAGNOSIS — N31.9 NEUROGENIC BLADDER: ICD-10-CM

## 2022-12-23 DIAGNOSIS — A49.9 ESBL (EXTENDED SPECTRUM BETA-LACTAMASE) PRODUCING BACTERIA INFECTION: ICD-10-CM

## 2022-12-23 LAB — BACTERIA BLD CULT: NORMAL

## 2022-12-23 PROCEDURE — 3044F HG A1C LEVEL LT 7.0%: CPT | Mod: CPTII,,, | Performed by: FAMILY MEDICINE

## 2022-12-23 PROCEDURE — 1159F MED LIST DOCD IN RCRD: CPT | Mod: CPTII,,, | Performed by: FAMILY MEDICINE

## 2022-12-23 PROCEDURE — 3008F BODY MASS INDEX DOCD: CPT | Mod: CPTII,,, | Performed by: FAMILY MEDICINE

## 2022-12-23 PROCEDURE — 1159F PR MEDICATION LIST DOCUMENTED IN MEDICAL RECORD: ICD-10-PCS | Mod: CPTII,,, | Performed by: FAMILY MEDICINE

## 2022-12-23 PROCEDURE — 3044F PR MOST RECENT HEMOGLOBIN A1C LEVEL <7.0%: ICD-10-PCS | Mod: CPTII,,, | Performed by: FAMILY MEDICINE

## 2022-12-23 PROCEDURE — 99999 PR PBB SHADOW E&M-EST. PATIENT-LVL III: CPT | Mod: PBBFAC,,, | Performed by: FAMILY MEDICINE

## 2022-12-23 PROCEDURE — 99214 PR OFFICE/OUTPT VISIT, EST, LEVL IV, 30-39 MIN: ICD-10-PCS | Mod: S$PBB,,, | Performed by: FAMILY MEDICINE

## 2022-12-23 PROCEDURE — 3075F PR MOST RECENT SYSTOLIC BLOOD PRESS GE 130-139MM HG: ICD-10-PCS | Mod: CPTII,,, | Performed by: FAMILY MEDICINE

## 2022-12-23 PROCEDURE — 3079F PR MOST RECENT DIASTOLIC BLOOD PRESSURE 80-89 MM HG: ICD-10-PCS | Mod: CPTII,,, | Performed by: FAMILY MEDICINE

## 2022-12-23 PROCEDURE — 3079F DIAST BP 80-89 MM HG: CPT | Mod: CPTII,,, | Performed by: FAMILY MEDICINE

## 2022-12-23 PROCEDURE — 99213 OFFICE O/P EST LOW 20 MIN: CPT | Mod: PBBFAC,PO | Performed by: FAMILY MEDICINE

## 2022-12-23 PROCEDURE — 3075F SYST BP GE 130 - 139MM HG: CPT | Mod: CPTII,,, | Performed by: FAMILY MEDICINE

## 2022-12-23 PROCEDURE — 1111F PR DISCHARGE MEDS RECONCILED W/ CURRENT OUTPATIENT MED LIST: ICD-10-PCS | Mod: CPTII,,, | Performed by: FAMILY MEDICINE

## 2022-12-23 PROCEDURE — 3008F PR BODY MASS INDEX (BMI) DOCUMENTED: ICD-10-PCS | Mod: CPTII,,, | Performed by: FAMILY MEDICINE

## 2022-12-23 PROCEDURE — 1111F DSCHRG MED/CURRENT MED MERGE: CPT | Mod: CPTII,,, | Performed by: FAMILY MEDICINE

## 2022-12-23 PROCEDURE — 99999 PR PBB SHADOW E&M-EST. PATIENT-LVL III: ICD-10-PCS | Mod: PBBFAC,,, | Performed by: FAMILY MEDICINE

## 2022-12-23 PROCEDURE — 99214 OFFICE O/P EST MOD 30 MIN: CPT | Mod: S$PBB,,, | Performed by: FAMILY MEDICINE

## 2022-12-23 RX ORDER — TRIAMCINOLONE ACETONIDE 1 MG/G
OINTMENT TOPICAL 2 TIMES DAILY
Qty: 80 G | Refills: 1 | Status: SHIPPED | OUTPATIENT
Start: 2022-12-23 | End: 2023-05-04 | Stop reason: SDUPTHER

## 2022-12-23 RX ORDER — TRAZODONE HYDROCHLORIDE 50 MG/1
50 TABLET ORAL NIGHTLY
Qty: 30 TABLET | Refills: 11 | Status: SHIPPED | OUTPATIENT
Start: 2022-12-23 | End: 2023-11-17

## 2022-12-23 NOTE — PROGRESS NOTES
Chief Complaint:    Chief Complaint   Patient presents with    Hospital Follow Up    crust on ear    sleep meds       History of Present Illness:  Patient with neurogenic bladder presents today for a hospital follow up on 12/18 for pyelonephritis    Presents with her mother today.   Symptoms include right-sided flank pain. Denies dysuria or fever. She was discharged with IV antibiotics. CT Renal Stone study showed right renal large staghorn calculi and mild hydroureteronephrosis with new mild perinephric stranding and increasing shoddy lymph nodes. It will be monitored for now. She's need an appt with urology. She is on iv abx has a PICC line  She reports trouble sleeping and has no relief with gummies.   She reports crusting on her L ear. Patient was prescribed ketoconazole for a similar lesion on her face but it didn't work for her ears.  Blood pressure elevated today. It usually runs below 140/90. FMHx of HTN.         Patient has underlying neurogenic bladder secondary to spina bifida and does intermittent catheterization. Still doing self-catheterization, not on preventative antibiotic anymore. Has kidney stones, probably causing infection. Was supposed have surgery but it was never scheduled due to COVID. Following with Dr. Reed. Her left kidney is atrophied. Her right kidney has hydronephrosis.          ROS:  Review of Systems   Constitutional:  Negative for appetite change, chills and fever.   HENT:  Negative for congestion, ear pain, postnasal drip, rhinorrhea, sinus pressure and sinus pain.    Eyes:  Negative for pain.   Respiratory:  Negative for cough, chest tightness and shortness of breath.    Cardiovascular:  Negative for chest pain and palpitations.   Gastrointestinal:  Negative for abdominal pain, blood in stool, constipation, diarrhea and nausea.   Genitourinary:  Negative for difficulty urinating, dysuria, flank pain and hematuria.   Musculoskeletal:  Negative for arthralgias and myalgias.  "  Skin:  Negative for pallor and wound.        Positive for lesion   Neurological:  Negative for dizziness, tremors, speech difficulty, light-headedness and headaches.   Psychiatric/Behavioral:  Positive for sleep disturbance. Negative for behavioral problems and dysphoric mood. The patient is not nervous/anxious.    All other systems reviewed and are negative.    Past Medical History:   Diagnosis Date    Spina bifida     Spina bifida        Social History:  Social History     Socioeconomic History    Marital status: Single   Tobacco Use    Smoking status: Never    Smokeless tobacco: Never   Substance and Sexual Activity    Alcohol use: Yes     Comment: Occaisonally    Drug use: Never    Sexual activity: Never       Family History:   family history includes Diabetes in her father and paternal grandmother; Hyperlipidemia in her father and mother; Hypertension in her mother.    Health Maintenance   Topic Date Due    TETANUS VACCINE  05/18/2032    Hepatitis C Screening  Completed    Lipid Panel  Completed       Physical Exam:    Vital Signs  Temp: 97.5 °F (36.4 °C)  Temp src: Tympanic  Pulse: 88  SpO2: 98 %  BP: 138/84  BP Location: Left arm  Patient Position: Sitting  Pain Score: 0-No pain  Height and Weight  Height: 4' 11" (149.9 cm)  Weight: 77.3 kg (170 lb 4.9 oz)  BSA (Calculated - sq m): 1.79 sq meters  BMI (Calculated): 34.4  Weight in (lb) to have BMI = 25: 123.5]    Body mass index is 34.4 kg/m².    Physical Exam  Vitals and nursing note reviewed.   Constitutional:       Appearance: Normal appearance. She is not toxic-appearing.   HENT:      Head: Normocephalic and atraumatic.        Right Ear: Tympanic membrane normal.      Left Ear: Tympanic membrane normal.   Eyes:      Extraocular Movements: Extraocular movements intact.      Pupils: Pupils are equal, round, and reactive to light.   Cardiovascular:      Rate and Rhythm: Normal rate and regular rhythm.      Heart sounds: Normal heart sounds.   Pulmonary:    "   Effort: Pulmonary effort is normal.      Breath sounds: Normal breath sounds. No wheezing, rhonchi or rales.   Abdominal:      General: Bowel sounds are normal. There is no distension.      Palpations: Abdomen is soft.      Tenderness: There is no abdominal tenderness.   Musculoskeletal:         General: Normal range of motion.      Cervical back: Normal range of motion.   Skin:     General: Skin is warm and dry.      Capillary Refill: Capillary refill takes less than 2 seconds.   Neurological:      General: No focal deficit present.      Mental Status: She is alert and oriented to person, place, and time.   Psychiatric:         Mood and Affect: Mood normal.         Behavior: Behavior normal.         Judgment: Judgment normal.         Assessment:      ICD-10-CM ICD-9-CM   1. Hospital discharge follow-up  Z09 V67.59   2. ESBL (extended spectrum beta-lactamase) producing bacteria infection  A49.9 041.89    Z16.12 V09.1   3. Neurogenic bladder  N31.9 596.54   4. Staghorn calculus  N20.0 592.0   5. Insomnia, unspecified type  G47.00 780.52   6. Seborrhea  L21.9 706.3     Plan:     Reviewed and discussed hospital visit. Patient should take aseptic precautions when doing self-catheterization and start low-dose long term antibiotics. Refer to urology for ESBL producing bacteria infection, neurogenic bladder.   She could benefit from low-dose antibiotic for UTI prevention, please see Urology.  Will need the staghorn calculus removed  Recommend Trazodone 50 mg for insomnia.   Recommend Kenalog 0.1% for seborrhea.   Orders Placed This Encounter   Procedures    Ambulatory referral/consult to Urology     Current Outpatient Medications   Medication Sig Dispense Refill    ISIBLOOM 0.15-0.03 mg per tablet TAKE 1 TABLET DAILY FOR BIRTH CONTROL 84 tablet 3    sodium chloride 0.9 % PgBk 100 mL with ertapenem 1 gram SolR 1 g Inject 1 g into the vein once daily. for 14 days (Patient not taking: Reported on 12/23/2022)      traZODone  (DESYREL) 50 MG tablet Take 1 tablet (50 mg total) by mouth every evening. 30 tablet 11    triamcinolone acetonide 0.1% (KENALOG) 0.1 % ointment Apply topically 2 (two) times daily. 80 g 1     No current facility-administered medications for this visit.       There are no discontinued medications.    No follow-ups on file.      Madie Horn MD  Scribe Attestation:   I, Gail Camacho, am scribing for, and in the presence of, Dr.Arif Horn I performed the above scribed service and the documentation accurately describes the services I performed. I attest to the accuracy of the note.    I, Dr. Madie Horn, reviewed documentation as scribed above. I performed the services described in this documentation.  I agree that the record reflects my personal performance and is accurate and complete. Madie Horn MD.  12/23/2022

## 2022-12-26 ENCOUNTER — LAB VISIT (OUTPATIENT)
Dept: LAB | Facility: HOSPITAL | Age: 37
End: 2022-12-26
Attending: FAMILY MEDICINE
Payer: MEDICAID

## 2022-12-26 DIAGNOSIS — R78.81 BACTEREMIA: Primary | ICD-10-CM

## 2022-12-26 LAB
ALBUMIN SERPL BCP-MCNC: 3.1 G/DL (ref 3.5–5.2)
ALP SERPL-CCNC: 86 U/L (ref 55–135)
ALT SERPL W/O P-5'-P-CCNC: 97 U/L (ref 10–44)
ANION GAP SERPL CALC-SCNC: 11 MMOL/L (ref 8–16)
AST SERPL-CCNC: 69 U/L (ref 10–40)
BASOPHILS # BLD AUTO: 0.05 K/UL (ref 0–0.2)
BASOPHILS NFR BLD: 0.6 % (ref 0–1.9)
BILIRUB SERPL-MCNC: 0.2 MG/DL (ref 0.1–1)
BUN SERPL-MCNC: 15 MG/DL (ref 6–20)
CALCIUM SERPL-MCNC: 9.5 MG/DL (ref 8.7–10.5)
CHLORIDE SERPL-SCNC: 106 MMOL/L (ref 95–110)
CO2 SERPL-SCNC: 23 MMOL/L (ref 23–29)
CREAT SERPL-MCNC: 0.9 MG/DL (ref 0.5–1.4)
CRP SERPL-MCNC: 33.3 MG/L (ref 0–8.2)
DIFFERENTIAL METHOD: ABNORMAL
EOSINOPHIL # BLD AUTO: 0.2 K/UL (ref 0–0.5)
EOSINOPHIL NFR BLD: 2.4 % (ref 0–8)
ERYTHROCYTE [DISTWIDTH] IN BLOOD BY AUTOMATED COUNT: 13.5 % (ref 11.5–14.5)
ERYTHROCYTE [SEDIMENTATION RATE] IN BLOOD BY WESTERGREN METHOD: 92 MM/HR (ref 0–20)
EST. GFR  (NO RACE VARIABLE): >60 ML/MIN/1.73 M^2
GLUCOSE SERPL-MCNC: 120 MG/DL (ref 70–110)
HCT VFR BLD AUTO: 31.6 % (ref 37–48.5)
HGB BLD-MCNC: 9.6 G/DL (ref 12–16)
IMM GRANULOCYTES # BLD AUTO: 0.03 K/UL (ref 0–0.04)
IMM GRANULOCYTES NFR BLD AUTO: 0.4 % (ref 0–0.5)
LYMPHOCYTES # BLD AUTO: 2.6 K/UL (ref 1–4.8)
LYMPHOCYTES NFR BLD: 31.8 % (ref 18–48)
MCH RBC QN AUTO: 26.7 PG (ref 27–31)
MCHC RBC AUTO-ENTMCNC: 30.4 G/DL (ref 32–36)
MCV RBC AUTO: 88 FL (ref 82–98)
MONOCYTES # BLD AUTO: 0.6 K/UL (ref 0.3–1)
MONOCYTES NFR BLD: 6.7 % (ref 4–15)
NEUTROPHILS # BLD AUTO: 4.7 K/UL (ref 1.8–7.7)
NEUTROPHILS NFR BLD: 58.1 % (ref 38–73)
NRBC BLD-RTO: 0 /100 WBC
PLATELET # BLD AUTO: 474 K/UL (ref 150–450)
PMV BLD AUTO: 9.1 FL (ref 9.2–12.9)
POTASSIUM SERPL-SCNC: 4.1 MMOL/L (ref 3.5–5.1)
PROT SERPL-MCNC: 8.5 G/DL (ref 6–8.4)
RBC # BLD AUTO: 3.6 M/UL (ref 4–5.4)
SODIUM SERPL-SCNC: 140 MMOL/L (ref 136–145)
WBC # BLD AUTO: 8.17 K/UL (ref 3.9–12.7)

## 2022-12-26 PROCEDURE — 85651 RBC SED RATE NONAUTOMATED: CPT | Performed by: FAMILY MEDICINE

## 2022-12-26 PROCEDURE — 86140 C-REACTIVE PROTEIN: CPT | Performed by: FAMILY MEDICINE

## 2022-12-26 PROCEDURE — 85025 COMPLETE CBC W/AUTO DIFF WBC: CPT | Performed by: FAMILY MEDICINE

## 2022-12-26 PROCEDURE — 80053 COMPREHEN METABOLIC PANEL: CPT | Performed by: FAMILY MEDICINE

## 2022-12-27 ENCOUNTER — TELEPHONE (OUTPATIENT)
Dept: INTERNAL MEDICINE | Facility: CLINIC | Age: 37
End: 2022-12-27
Payer: MEDICAID

## 2022-12-27 DIAGNOSIS — R79.82 ELEVATED C-REACTIVE PROTEIN (CRP): Primary | ICD-10-CM

## 2022-12-27 LAB
BACTERIA BLD CULT: NORMAL
BACTERIA BLD CULT: NORMAL

## 2022-12-27 NOTE — TELEPHONE ENCOUNTER
----- Message from Madie Horn MD sent at 12/26/2022  5:20 PM CST -----  Labs are abnormal including markers of inflammation sed rate, liver enzymes and CRP, this is secondary to the sickness.    Hopefully these numbers will improve once the antibiotic is over and sickness improves.  We can recheck a CMP, CRP and a CBC in 3-4 weeks.

## 2022-12-28 ENCOUNTER — PATIENT MESSAGE (OUTPATIENT)
Dept: FAMILY MEDICINE | Facility: CLINIC | Age: 37
End: 2022-12-28
Payer: MEDICAID

## 2022-12-28 NOTE — PHYSICIAN QUERY
PT Name: Addis Delvalle  MR #: [id number]     DOCUMENTATION CLARIFICATION      CDS/: Bessy Bourne RN          Contact information: [email or web address]   This form is a permanent document in the medical record.     Query Date: December 28, 2022    By submitting this query, we are merely seeking further clarification of documentation to reflect the severity of illness of your patient. Please utilize your independent clinical judgment when addressing the question(s) below.    The Medical Record contains the following:     Indicators   Supporting Clinical Findings Location in Medical Record   x Documentation of condition   Admit for sepsis s/t pyelonephritis H & P of 12/18    x Urinary Device, Catheter Suprapubic Catheter    Suprapubic Catheter   LDA Documentation     Discharge Summary of 12/22    x Lab Value(s)  12/18/22    WBC 20.54     Labs of 12/18          x UA Results     12/18/22    Specimen UA Urine, Catheterized   Color, UA Yellow   Appearance, UA Cloudy    Specific Gravity, UA 1.010   pH, UA 6.0   Protein, UA 2+ !   Glucose, UA Negative   Ketones, UA Negative   Occult Blood UA 2+    NITRITE UA Positive    UROBILINOGEN UA Negative   Bilirubin (UA) Negative   Leukocytes, UA 3+ !   RBC, UA 37    WBC, UA >100    Bacteria, UA Many    Squam Epithel, UA 2   WBC Clumps, UA Many    Hyaline Casts, UA 0   Unclass Mei UA Occasional      U/A of 12/18                                                                      x Cultures   Escherichia Coli ESBL Urine culture of 12/18    x Treatment/Medication Invanz 1 g IV every 24 hours     Meropenem 500 mg IV every 6 hours     sodium chloride 0.9 % PgBk 100 mL with ertapenem 1 gram SolR 1 g  Inject 1 g into the vein once daily. for 14 days     MAR 12/22     MAR 12/18 to 12/21     Discharge Summary of 12/22     Other        Provider, please clarify if there is any clinical correlation between pyelonephritis and Suprapubic Catheter.     [ x  ] Due to or associated  with each other     [   ] Unrelated to each other     [   ] Other explanation (please specify): _____________     [   ] Clinically undetermined       Please document in your progress notes daily for the duration of treatment until resolved, and include in your discharge summary.    Form No. [id number]

## 2023-01-02 ENCOUNTER — LAB VISIT (OUTPATIENT)
Dept: LAB | Facility: HOSPITAL | Age: 38
End: 2023-01-02
Attending: INTERNAL MEDICINE
Payer: MEDICAID

## 2023-01-02 DIAGNOSIS — R78.81 BACTEREMIA: Primary | ICD-10-CM

## 2023-01-02 LAB
ALBUMIN SERPL BCP-MCNC: 3.1 G/DL (ref 3.5–5.2)
ALP SERPL-CCNC: 82 U/L (ref 55–135)
ALT SERPL W/O P-5'-P-CCNC: 50 U/L (ref 10–44)
ANION GAP SERPL CALC-SCNC: 11 MMOL/L (ref 8–16)
AST SERPL-CCNC: 25 U/L (ref 10–40)
BASOPHILS # BLD AUTO: 0.06 K/UL (ref 0–0.2)
BASOPHILS NFR BLD: 0.6 % (ref 0–1.9)
BILIRUB SERPL-MCNC: 0.2 MG/DL (ref 0.1–1)
BUN SERPL-MCNC: 13 MG/DL (ref 6–20)
CALCIUM SERPL-MCNC: 9.3 MG/DL (ref 8.7–10.5)
CHLORIDE SERPL-SCNC: 108 MMOL/L (ref 95–110)
CO2 SERPL-SCNC: 20 MMOL/L (ref 23–29)
CREAT SERPL-MCNC: 0.8 MG/DL (ref 0.5–1.4)
CRP SERPL-MCNC: 19 MG/L (ref 0–8.2)
DIFFERENTIAL METHOD: ABNORMAL
EOSINOPHIL # BLD AUTO: 0.2 K/UL (ref 0–0.5)
EOSINOPHIL NFR BLD: 2.3 % (ref 0–8)
ERYTHROCYTE [DISTWIDTH] IN BLOOD BY AUTOMATED COUNT: 14.6 % (ref 11.5–14.5)
ERYTHROCYTE [SEDIMENTATION RATE] IN BLOOD BY WESTERGREN METHOD: 79 MM/HR (ref 0–20)
EST. GFR  (NO RACE VARIABLE): >60 ML/MIN/1.73 M^2
GLUCOSE SERPL-MCNC: 117 MG/DL (ref 70–110)
HCT VFR BLD AUTO: 32.6 % (ref 37–48.5)
HGB BLD-MCNC: 9.9 G/DL (ref 12–16)
IMM GRANULOCYTES # BLD AUTO: 0.03 K/UL (ref 0–0.04)
IMM GRANULOCYTES NFR BLD AUTO: 0.3 % (ref 0–0.5)
LYMPHOCYTES # BLD AUTO: 2.8 K/UL (ref 1–4.8)
LYMPHOCYTES NFR BLD: 29.4 % (ref 18–48)
MCH RBC QN AUTO: 27.3 PG (ref 27–31)
MCHC RBC AUTO-ENTMCNC: 30.4 G/DL (ref 32–36)
MCV RBC AUTO: 90 FL (ref 82–98)
MONOCYTES # BLD AUTO: 0.6 K/UL (ref 0.3–1)
MONOCYTES NFR BLD: 6.2 % (ref 4–15)
NEUTROPHILS # BLD AUTO: 5.9 K/UL (ref 1.8–7.7)
NEUTROPHILS NFR BLD: 61.2 % (ref 38–73)
NRBC BLD-RTO: 0 /100 WBC
PLATELET # BLD AUTO: 411 K/UL (ref 150–450)
PMV BLD AUTO: 9.7 FL (ref 9.2–12.9)
POTASSIUM SERPL-SCNC: 4.6 MMOL/L (ref 3.5–5.1)
PROT SERPL-MCNC: 7.6 G/DL (ref 6–8.4)
RBC # BLD AUTO: 3.62 M/UL (ref 4–5.4)
SODIUM SERPL-SCNC: 139 MMOL/L (ref 136–145)
WBC # BLD AUTO: 9.64 K/UL (ref 3.9–12.7)

## 2023-01-02 PROCEDURE — 80053 COMPREHEN METABOLIC PANEL: CPT | Performed by: INTERNAL MEDICINE

## 2023-01-02 PROCEDURE — 86140 C-REACTIVE PROTEIN: CPT | Performed by: INTERNAL MEDICINE

## 2023-01-02 PROCEDURE — 85651 RBC SED RATE NONAUTOMATED: CPT | Performed by: INTERNAL MEDICINE

## 2023-01-02 PROCEDURE — 85025 COMPLETE CBC W/AUTO DIFF WBC: CPT | Performed by: INTERNAL MEDICINE

## 2023-01-10 ENCOUNTER — PATIENT MESSAGE (OUTPATIENT)
Dept: FAMILY MEDICINE | Facility: CLINIC | Age: 38
End: 2023-01-10
Payer: MEDICAID

## 2023-01-12 ENCOUNTER — LAB VISIT (OUTPATIENT)
Dept: LAB | Facility: HOSPITAL | Age: 38
End: 2023-01-12
Attending: FAMILY MEDICINE
Payer: MEDICAID

## 2023-01-12 DIAGNOSIS — R79.82 ELEVATED C-REACTIVE PROTEIN (CRP): ICD-10-CM

## 2023-01-12 LAB
ALBUMIN SERPL BCP-MCNC: 3.4 G/DL (ref 3.5–5.2)
ALP SERPL-CCNC: 60 U/L (ref 55–135)
ALT SERPL W/O P-5'-P-CCNC: 12 U/L (ref 10–44)
ANION GAP SERPL CALC-SCNC: 9 MMOL/L (ref 8–16)
AST SERPL-CCNC: 13 U/L (ref 10–40)
BILIRUB SERPL-MCNC: 0.4 MG/DL (ref 0.1–1)
BUN SERPL-MCNC: 14 MG/DL (ref 6–20)
CALCIUM SERPL-MCNC: 9.5 MG/DL (ref 8.7–10.5)
CHLORIDE SERPL-SCNC: 108 MMOL/L (ref 95–110)
CO2 SERPL-SCNC: 21 MMOL/L (ref 23–29)
CREAT SERPL-MCNC: 1 MG/DL (ref 0.5–1.4)
CRP SERPL-MCNC: 15.9 MG/L (ref 0–8.2)
EST. GFR  (NO RACE VARIABLE): >60 ML/MIN/1.73 M^2
GLUCOSE SERPL-MCNC: 85 MG/DL (ref 70–110)
POTASSIUM SERPL-SCNC: 4.3 MMOL/L (ref 3.5–5.1)
PROT SERPL-MCNC: 7.7 G/DL (ref 6–8.4)
SODIUM SERPL-SCNC: 138 MMOL/L (ref 136–145)

## 2023-01-12 PROCEDURE — 85025 COMPLETE CBC W/AUTO DIFF WBC: CPT | Performed by: FAMILY MEDICINE

## 2023-01-12 PROCEDURE — 36415 COLL VENOUS BLD VENIPUNCTURE: CPT | Mod: PO | Performed by: FAMILY MEDICINE

## 2023-01-12 PROCEDURE — 80053 COMPREHEN METABOLIC PANEL: CPT | Performed by: FAMILY MEDICINE

## 2023-01-12 PROCEDURE — 86140 C-REACTIVE PROTEIN: CPT | Performed by: FAMILY MEDICINE

## 2023-01-13 LAB
BASOPHILS # BLD AUTO: 0.03 K/UL (ref 0–0.2)
BASOPHILS NFR BLD: 0.3 % (ref 0–1.9)
DIFFERENTIAL METHOD: ABNORMAL
EOSINOPHIL # BLD AUTO: 0.1 K/UL (ref 0–0.5)
EOSINOPHIL NFR BLD: 1 % (ref 0–8)
ERYTHROCYTE [DISTWIDTH] IN BLOOD BY AUTOMATED COUNT: 15.2 % (ref 11.5–14.5)
HCT VFR BLD AUTO: 32.8 % (ref 37–48.5)
HGB BLD-MCNC: 10 G/DL (ref 12–16)
IMM GRANULOCYTES # BLD AUTO: 0.01 K/UL (ref 0–0.04)
IMM GRANULOCYTES NFR BLD AUTO: 0.1 % (ref 0–0.5)
LYMPHOCYTES # BLD AUTO: 2.8 K/UL (ref 1–4.8)
LYMPHOCYTES NFR BLD: 32.1 % (ref 18–48)
MCH RBC QN AUTO: 27.9 PG (ref 27–31)
MCHC RBC AUTO-ENTMCNC: 30.5 G/DL (ref 32–36)
MCV RBC AUTO: 91 FL (ref 82–98)
MONOCYTES # BLD AUTO: 0.7 K/UL (ref 0.3–1)
MONOCYTES NFR BLD: 7.5 % (ref 4–15)
NEUTROPHILS # BLD AUTO: 5.1 K/UL (ref 1.8–7.7)
NEUTROPHILS NFR BLD: 59 % (ref 38–73)
NRBC BLD-RTO: 0 /100 WBC
PLATELET # BLD AUTO: 355 K/UL (ref 150–450)
PMV BLD AUTO: 10.8 FL (ref 9.2–12.9)
RBC # BLD AUTO: 3.59 M/UL (ref 4–5.4)
WBC # BLD AUTO: 8.64 K/UL (ref 3.9–12.7)

## 2023-01-19 ENCOUNTER — TELEPHONE (OUTPATIENT)
Dept: INTERNAL MEDICINE | Facility: CLINIC | Age: 38
End: 2023-01-19
Payer: MEDICAID

## 2023-02-15 ENCOUNTER — OFFICE VISIT (OUTPATIENT)
Dept: UROLOGY | Facility: CLINIC | Age: 38
End: 2023-02-15
Payer: MEDICAID

## 2023-02-15 VITALS
HEIGHT: 59 IN | WEIGHT: 166.25 LBS | DIASTOLIC BLOOD PRESSURE: 80 MMHG | BODY MASS INDEX: 33.52 KG/M2 | HEART RATE: 69 BPM | SYSTOLIC BLOOD PRESSURE: 146 MMHG

## 2023-02-15 DIAGNOSIS — N20.0 STAGHORN CALCULUS: ICD-10-CM

## 2023-02-15 DIAGNOSIS — N31.9 NEUROGENIC BLADDER: Primary | ICD-10-CM

## 2023-02-15 PROBLEM — R65.20 SEVERE SEPSIS: Status: RESOLVED | Noted: 2022-12-18 | Resolved: 2023-02-15

## 2023-02-15 PROBLEM — N39.0 UTI (URINARY TRACT INFECTION): Status: RESOLVED | Noted: 2020-01-11 | Resolved: 2023-02-15

## 2023-02-15 PROBLEM — A49.9 ESBL (EXTENDED SPECTRUM BETA-LACTAMASE) PRODUCING BACTERIA INFECTION: Status: RESOLVED | Noted: 2020-01-10 | Resolved: 2023-02-15

## 2023-02-15 PROBLEM — A41.9 SEVERE SEPSIS: Status: RESOLVED | Noted: 2022-12-18 | Resolved: 2023-02-15

## 2023-02-15 PROBLEM — Z16.12 ESBL (EXTENDED SPECTRUM BETA-LACTAMASE) PRODUCING BACTERIA INFECTION: Status: RESOLVED | Noted: 2020-01-10 | Resolved: 2023-02-15

## 2023-02-15 PROBLEM — N12 PYELONEPHRITIS: Status: RESOLVED | Noted: 2020-01-12 | Resolved: 2023-02-15

## 2023-02-15 PROBLEM — R78.81 BACTEREMIA: Status: RESOLVED | Noted: 2020-01-10 | Resolved: 2023-02-15

## 2023-02-15 PROCEDURE — 3077F SYST BP >= 140 MM HG: CPT | Mod: CPTII,,, | Performed by: NURSE PRACTITIONER

## 2023-02-15 PROCEDURE — 1159F MED LIST DOCD IN RCRD: CPT | Mod: CPTII,,, | Performed by: NURSE PRACTITIONER

## 2023-02-15 PROCEDURE — 3079F PR MOST RECENT DIASTOLIC BLOOD PRESSURE 80-89 MM HG: ICD-10-PCS | Mod: CPTII,,, | Performed by: NURSE PRACTITIONER

## 2023-02-15 PROCEDURE — 3077F PR MOST RECENT SYSTOLIC BLOOD PRESSURE >= 140 MM HG: ICD-10-PCS | Mod: CPTII,,, | Performed by: NURSE PRACTITIONER

## 2023-02-15 PROCEDURE — 99213 OFFICE O/P EST LOW 20 MIN: CPT | Mod: PBBFAC | Performed by: NURSE PRACTITIONER

## 2023-02-15 PROCEDURE — 99999 PR PBB SHADOW E&M-EST. PATIENT-LVL III: CPT | Mod: PBBFAC,,, | Performed by: NURSE PRACTITIONER

## 2023-02-15 PROCEDURE — 1159F PR MEDICATION LIST DOCUMENTED IN MEDICAL RECORD: ICD-10-PCS | Mod: CPTII,,, | Performed by: NURSE PRACTITIONER

## 2023-02-15 PROCEDURE — 99214 PR OFFICE/OUTPT VISIT, EST, LEVL IV, 30-39 MIN: ICD-10-PCS | Mod: S$PBB,,, | Performed by: NURSE PRACTITIONER

## 2023-02-15 PROCEDURE — 99214 OFFICE O/P EST MOD 30 MIN: CPT | Mod: S$PBB,,, | Performed by: NURSE PRACTITIONER

## 2023-02-15 PROCEDURE — 3079F DIAST BP 80-89 MM HG: CPT | Mod: CPTII,,, | Performed by: NURSE PRACTITIONER

## 2023-02-15 PROCEDURE — 3008F BODY MASS INDEX DOCD: CPT | Mod: CPTII,,, | Performed by: NURSE PRACTITIONER

## 2023-02-15 PROCEDURE — 3008F PR BODY MASS INDEX (BMI) DOCUMENTED: ICD-10-PCS | Mod: CPTII,,, | Performed by: NURSE PRACTITIONER

## 2023-02-15 PROCEDURE — 99999 PR PBB SHADOW E&M-EST. PATIENT-LVL III: ICD-10-PCS | Mod: PBBFAC,,, | Performed by: NURSE PRACTITIONER

## 2023-02-15 NOTE — PROGRESS NOTES
"Chief Complaint:   Neurogenic bladder  Right renal large staghorn calculi    HPI:   Patient 37-year-old female that is familiar with our clinic secondary to neurogenic bladder, large right staghorn renal stone and recurrent urinary tract infections.  Patient was hospitalized, early December of last year, for UTI sepsis and right-sided flank pain.  CT scan renal stone study indicated large Right renal large staghorn calculi.  Patient reports that she is been asymptomatic, since her inpatient stay,12/2022.  Patient has a neurogenic bladder secondary to spina bifida and utilizes CIC 5 times daily.  Has a solitary functional kidney.  Denies gross hematuria.  Pastora RETANA  12/18/2022 - patient returns today to the ER for evaluation of right-sided flank pain, patient began having pain Friday evening, no fevers that she can recall, she thought it was a UTI so she presented here for evaluation, urine is nitrite positive, CT was evaluated which showed stability of her renal stones, patient has a functional solitary kidney, normal renal function, denies any gross hematuria or decreased urination  04/15/2021 Derek Mancini Lucia Delvalle is a 35 y.o. female here for evaluation of neurogenic bladder. She has a h/o spina bifida and neurogenic bladder, currently managed with bladder augment and continent catheterizable channel, done during early childhood. Caths Q4-5 hours. Hasn't seen a urologist in a while.   Thinks she has a UTI. Urine is "strong". No pain. No fever. No gross hematuria. Getting UTIs at least once a month. Symptoms typically include strong smelling urine and feelings of dehydration (dry mouth).   She was seeing Elana Edwards in the past, last a few years ago. Reports a cystoscopy was done. States that she was told her bladder was "tilted", and doesn't drain all the way. She was on oxybutynin for a year. Has been off for 5-6 years. Can't remember why she stopped or if she just ran out.   Sometimes has " incontinence per urethra. Really only if she doesn't cath as often as she is supposed to.   Doesn't have a bowel management regimen.       Allergies:  Cefaclor, Cephalexin, Meperidine, and Promethazine    Medications:  has a current medication list which includes the following prescription(s): isibloom, trazodone, and triamcinolone acetonide 0.1%.    Review of Systems:  General: No fever, chills, fatigability, or weight loss.  Skin: No rashes, itching, or changes in color or texture of skin.  Chest: Denies DIEZ, cyanosis, wheezing, cough, and sputum production.  Abdomen: Appetite fine. No weight loss. Denies diarrhea, abdominal pain, hematemesis, or blood in stool.  Musculoskeletal: No joint stiffness or swelling. Denies back pain.  : As above.  All other review of systems negative.    PMH:   has a past medical history of Spina bifida and Spina bifida.    PSH:   has a past surgical history that includes CSF shunt; Insertion of suprapubic catheter (1990); and Bladder augmentation.    FamHx: family history includes Diabetes in her father and paternal grandmother; Hyperlipidemia in her father and mother; Hypertension in her mother.    SocHx:  reports that she has never smoked. She has never used smokeless tobacco. She reports current alcohol use. She reports that she does not use drugs.      Physical Exam:  Vitals:    02/15/23 0848   BP: (!) 146/80   Pulse: 69     General: A&Ox3, no apparent distress, no deformities  Neck: No masses, normal thyroid  Lungs: normal inspiration, no use of accessory muscles  Heart: normal pulse, no arrhythmias  Abdomen: Soft, NT, ND, no masses, no hernias, no hepatosplenomegaly  Lymphatic: Neck and groin nodes negative    Labs/Studies:   RADIOLOGY:  CT RENAL STONE STUDY ABD PELVIS WO        12/18/2022     CLINICAL HISTORY:  Flank pain, kidney stone suspected;     TECHNIQUE:  Unenhanced imaging renal stone protocol with 2D reformations     Automated exposure control technique utilized for  diminishing radiation exposure dose     COMPARISON:  May 2021     FINDINGS:  Unenhanced liver and spleen stable size     Gallbladder mildly distended indistinct wall     Unenhanced pancreas stable     Again left kidney markedly atrophic.  Right renal large staghorn calculi is re-identified.  Right hydro ureteral nephrosis and mild urothelial prominence diffusely similar to prior exam.     Urinary bladder morphology and mural thickening similar to prior exam given differences in distension.     No obstructive or overt inflammatory bowel findings.  Postsurgical change re-identified.  Mural lipid deposition terminal ileum and proximal colon similar to previous exam which can be seen in chronic inflammatory states     Shotty retroperitoneal lymph nodes may be slightly more prominent as visualized unenhanced imaging     Right basilar atelectasis or airspace opacity new since prior exam     Peritoneal catheter similar position without atypia     Small fat containing ventral abdominal wall hernia re-identified similar     Impression:     New right basilar atelectasis or airspace opacity     Right renal large staghorn calculi and mild hydroureteronephrosis with new mild perinephric stranding and increasing shoddy lymph nodes    Impression/Plan:   Right renal large staghorn calculi  Patient will schedule an MD to discuss surgical option for large staghorn stone.

## 2023-03-07 ENCOUNTER — OFFICE VISIT (OUTPATIENT)
Dept: UROLOGY | Facility: CLINIC | Age: 38
End: 2023-03-07
Payer: MEDICAID

## 2023-03-07 VITALS
HEART RATE: 89 BPM | DIASTOLIC BLOOD PRESSURE: 92 MMHG | SYSTOLIC BLOOD PRESSURE: 160 MMHG | HEIGHT: 59 IN | WEIGHT: 172.63 LBS | BODY MASS INDEX: 34.8 KG/M2

## 2023-03-07 DIAGNOSIS — N20.0 STAGHORN CALCULUS: Primary | ICD-10-CM

## 2023-03-07 DIAGNOSIS — Q05.9 SPINA BIFIDA, UNSPECIFIED HYDROCEPHALUS PRESENCE, UNSPECIFIED SPINAL REGION: ICD-10-CM

## 2023-03-07 DIAGNOSIS — Z01.818 PRE-OP TESTING: ICD-10-CM

## 2023-03-07 DIAGNOSIS — Q60.0 SOLITARY KIDNEY, CONGENITAL: ICD-10-CM

## 2023-03-07 DIAGNOSIS — N31.9 NEUROGENIC BLADDER: ICD-10-CM

## 2023-03-07 PROCEDURE — 99999 PR PBB SHADOW E&M-EST. PATIENT-LVL IV: CPT | Mod: PBBFAC,,, | Performed by: UROLOGY

## 2023-03-07 PROCEDURE — 3080F PR MOST RECENT DIASTOLIC BLOOD PRESSURE >= 90 MM HG: ICD-10-PCS | Mod: CPTII,,, | Performed by: UROLOGY

## 2023-03-07 PROCEDURE — 87088 URINE BACTERIA CULTURE: CPT | Performed by: UROLOGY

## 2023-03-07 PROCEDURE — 87086 URINE CULTURE/COLONY COUNT: CPT | Performed by: UROLOGY

## 2023-03-07 PROCEDURE — 99215 OFFICE O/P EST HI 40 MIN: CPT | Mod: S$PBB,,, | Performed by: UROLOGY

## 2023-03-07 PROCEDURE — 3008F BODY MASS INDEX DOCD: CPT | Mod: CPTII,,, | Performed by: UROLOGY

## 2023-03-07 PROCEDURE — 3077F PR MOST RECENT SYSTOLIC BLOOD PRESSURE >= 140 MM HG: ICD-10-PCS | Mod: CPTII,,, | Performed by: UROLOGY

## 2023-03-07 PROCEDURE — 1159F PR MEDICATION LIST DOCUMENTED IN MEDICAL RECORD: ICD-10-PCS | Mod: CPTII,,, | Performed by: UROLOGY

## 2023-03-07 PROCEDURE — 99214 OFFICE O/P EST MOD 30 MIN: CPT | Mod: PBBFAC,PN | Performed by: UROLOGY

## 2023-03-07 PROCEDURE — 3077F SYST BP >= 140 MM HG: CPT | Mod: CPTII,,, | Performed by: UROLOGY

## 2023-03-07 PROCEDURE — 3008F PR BODY MASS INDEX (BMI) DOCUMENTED: ICD-10-PCS | Mod: CPTII,,, | Performed by: UROLOGY

## 2023-03-07 PROCEDURE — 1159F MED LIST DOCD IN RCRD: CPT | Mod: CPTII,,, | Performed by: UROLOGY

## 2023-03-07 PROCEDURE — 99215 PR OFFICE/OUTPT VISIT, EST, LEVL V, 40-54 MIN: ICD-10-PCS | Mod: S$PBB,,, | Performed by: UROLOGY

## 2023-03-07 PROCEDURE — 87077 CULTURE AEROBIC IDENTIFY: CPT | Mod: 59 | Performed by: UROLOGY

## 2023-03-07 PROCEDURE — 87186 SC STD MICRODIL/AGAR DIL: CPT | Mod: 59 | Performed by: UROLOGY

## 2023-03-07 PROCEDURE — 3080F DIAST BP >= 90 MM HG: CPT | Mod: CPTII,,, | Performed by: UROLOGY

## 2023-03-07 PROCEDURE — 99999 PR PBB SHADOW E&M-EST. PATIENT-LVL IV: ICD-10-PCS | Mod: PBBFAC,,, | Performed by: UROLOGY

## 2023-03-07 NOTE — H&P (VIEW-ONLY)
"    Cc: neurogenic bladder    History of Present Illness:     3/7/23- Pt returns for management of her large staghorn calculus. She was hospitalized 12/2022 with pyelonephritis and bacteremia with very resistant ESBL E. coli. She had a repeat CT scan performed during that stay, which I have personally reviewed, now showing increased stone burden with perinephric stranding of her solitary kidney. [place] [facility] She denies recent flank pain or fever. No abdominal pain. She continues to perform CIC.     4/15/21- Pt is a 36yo female, here for evaluation of neurogenic bladder. She has a h/o spina bifida and neurogenic bladder, currently managed with bladder augment and continent catheterizable channel, done during early childhood. Caths Q4-5 hours. Hasn't seen a urologist in a while.   Thinks she has a UTI. Urine is "strong". No pain. No fever. No gross hematuria. Getting UTIs at least once a month. Symptoms typically include strong smelling urine and feelings of dehydration (dry mouth).   She was seeing Elana Edwards in the past, last a few years ago. Reports a cystoscopy was done. States that she was told her bladder was "tilted", and doesn't drain all the way. She was on oxybutynin for a year. Has been off for 5-6 years. Can't remember why she stopped or if she just ran out.   Sometimes has incontinence per urethra. Really only if she doesn't cath as often as she is supposed to.   Doesn't have a bowel management regimen.       Review of Systems   Constitutional:  Negative for chills and fever.   HENT:  Negative for nosebleeds.    Respiratory:  Negative for shortness of breath.    Cardiovascular:  Negative for chest pain.   Gastrointestinal:  Positive for constipation. Negative for abdominal pain and blood in stool.   Musculoskeletal:  Negative for gait problem.   Neurological:  Negative for headaches.   Hematological:  Negative for adenopathy. Does not bruise/bleed easily.   All other systems reviewed and " are negative.      Past Medical History:   Diagnosis Date    Spina bifida     Spina bifida        Past Surgical History:   Procedure Laterality Date    BLADDER AUGMENTATION      CSF SHUNT      INSERTION OF SUPRAPUBIC CATHETER  1990       Family History   Problem Relation Age of Onset    Hyperlipidemia Mother     Hypertension Mother     Diabetes Father     Hyperlipidemia Father     Diabetes Paternal Grandmother        Social History     Tobacco Use    Smoking status: Never    Smokeless tobacco: Never   Substance Use Topics    Alcohol use: Yes     Comment: Occaisonally    Drug use: Never       Current Outpatient Medications   Medication Sig Dispense Refill    ISIBLOOM 0.15-0.03 mg per tablet TAKE 1 TABLET DAILY FOR BIRTH CONTROL 84 tablet 3    traZODone (DESYREL) 50 MG tablet Take 1 tablet (50 mg total) by mouth every evening. 30 tablet 11    triamcinolone acetonide 0.1% (KENALOG) 0.1 % ointment Apply topically 2 (two) times daily. 80 g 1     Current Facility-Administered Medications   Medication Dose Route Frequency Provider Last Rate Last Admin    meropenem (MERREM) 1 g in sodium chloride 0.9% 100 mL IVPB  1 g Intravenous Q8H Merle Reed MD           Review of patient's allergies indicates:   Allergen Reactions    Cefaclor     Cephalexin     Meperidine      vomiting    Promethazine        Physical Exam  Vitals:    03/07/23 1423   BP: (!) 160/92   Pulse: 89     General: Well-developed, well-nourished, in no acute distress  HEENT: Normocephalic, atraumatic, extraocular movements intact  Neck: Supple, no supraclavicular or cervical lymphadenopathy, trachea midline  Respirations: even and unlabored  Back: midline spine, No CVA tenderness  Abdomen: soft, Non-tender, non-distended, no palpable masses, no rebound or guarding, continent catheterizable umbilical channel  Extremities: moves all equally, no clubbing, cyanosis or edema  Skin: Warm and dry. No lesions  Psych: normal affect  Neuro: Alert and oriented x 3.  Cranial nerves II-XII intact      Urinalysis  2+ LE, nit pos, 50 blood      Assessment:  1. Staghorn calculus  Place in Outpatient - Extended Recovery    Case Request Operating Room: PCNL    Diet NPO    APTT    Protime-INR    Type And Screen Preop    Place sequential compression device    Urine culture    IR Nephrostomy Tube Placement      2. Neurogenic bladder        3. Spina bifida, unspecified hydrocephalus presence, unspecified spinal region        4. Pre-op testing  CBC Auto Differential    Basic Metabolic Panel    Urine culture      5. Solitary kidney, congenital                Plan:   Staghorn calculus  -     Place in Outpatient - Extended Recovery; Standing  -     Case Request Operating Room: PCNL  -     Diet NPO; Standing  -     APTT; Future; Expected date: 03/07/2023  -     Protime-INR; Future; Expected date: 03/07/2023  -     Type And Screen Preop; Future; Expected date: 03/07/2023  -     Place sequential compression device; Standing  -     Urine culture  -     IR Nephrostomy Tube Placement; Future; Expected date: 04/27/2023    Neurogenic bladder    Spina bifida, unspecified hydrocephalus presence, unspecified spinal region    Pre-op testing  -     CBC Auto Differential; Future; Expected date: 03/07/2023  -     Basic Metabolic Panel; Future; Expected date: 03/07/2023  -     Urine culture; Future; Expected date: 09/07/2023    Solitary kidney, congenital    Other orders  -     IP VTE LOW RISK PATIENT; Standing  -     meropenem (MERREM) 1 g in sodium chloride 0.9% 100 mL IVPB      I had a discussion with the patient regarding the logistics of getting her in with a teaching program in , vs PCNL here in [place]. We discussed that her stone burden has grown in the waiting period. She would like to pursue PCNL here, so I will schedule her here on 4/27/23. Urine culture to be done 2 weeks prior so she can start appropriate antibiotics.

## 2023-03-07 NOTE — PROGRESS NOTES
"    Cc: neurogenic bladder    History of Present Illness:     3/7/23- Pt returns for management of her large staghorn calculus. She was hospitalized 12/2022 with pyelonephritis and bacteremia with very resistant ESBL E. coli. She had a repeat CT scan performed during that stay, which I have personally reviewed, now showing increased stone burden with perinephric stranding of her solitary kidney. She was never seen at [facility]. She denies recent flank pain or fever. No abdominal pain. She continues to perform CIC.     4/15/21- Pt is a 36yo female, here for evaluation of neurogenic bladder. She has a h/o spina bifida and neurogenic bladder, currently managed with bladder augment and continent catheterizable channel, done during early childhood. Caths Q4-5 hours. Hasn't seen a urologist in a while.   Thinks she has a UTI. Urine is "strong". No pain. No fever. No gross hematuria. Getting UTIs at least once a month. Symptoms typically include strong smelling urine and feelings of dehydration (dry mouth).   She was seeing Elana Edwards in the past, last a few years ago. Reports a cystoscopy was done. States that she was told her bladder was "tilted", and doesn't drain all the way. She was on oxybutynin for a year. Has been off for 5-6 years. Can't remember why she stopped or if she just ran out.   Sometimes has incontinence per urethra. Really only if she doesn't cath as often as she is supposed to.   Doesn't have a bowel management regimen.       Review of Systems   Constitutional:  Negative for chills and fever.   HENT:  Negative for nosebleeds.    Respiratory:  Negative for shortness of breath.    Cardiovascular:  Negative for chest pain.   Gastrointestinal:  Positive for constipation. Negative for abdominal pain and blood in stool.   Musculoskeletal:  Negative for gait problem.   Neurological:  Negative for headaches.   Hematological:  Negative for adenopathy. Does not bruise/bleed easily.   All other systems reviewed and " are negative.      Past Medical History:   Diagnosis Date    Spina bifida     Spina bifida        Past Surgical History:   Procedure Laterality Date    BLADDER AUGMENTATION      CSF SHUNT      INSERTION OF SUPRAPUBIC CATHETER  1990       Family History   Problem Relation Age of Onset    Hyperlipidemia Mother     Hypertension Mother     Diabetes Father     Hyperlipidemia Father     Diabetes Paternal Grandmother        Social History     Tobacco Use    Smoking status: Never    Smokeless tobacco: Never   Substance Use Topics    Alcohol use: Yes     Comment: Occaisonally    Drug use: Never       Current Outpatient Medications   Medication Sig Dispense Refill    ISIBLOOM 0.15-0.03 mg per tablet TAKE 1 TABLET DAILY FOR BIRTH CONTROL 84 tablet 3    traZODone (DESYREL) 50 MG tablet Take 1 tablet (50 mg total) by mouth every evening. 30 tablet 11    triamcinolone acetonide 0.1% (KENALOG) 0.1 % ointment Apply topically 2 (two) times daily. 80 g 1     Current Facility-Administered Medications   Medication Dose Route Frequency Provider Last Rate Last Admin    meropenem (MERREM) 1 g in sodium chloride 0.9% 100 mL IVPB  1 g Intravenous Q8H Merle Reed MD           Review of patient's allergies indicates:   Allergen Reactions    Cefaclor     Cephalexin     Meperidine      vomiting    Promethazine        Physical Exam  Vitals:    03/07/23 1423   BP: (!) 160/92   Pulse: 89     General: Well-developed, well-nourished, in no acute distress  HEENT: Normocephalic, atraumatic, extraocular movements intact  Neck: Supple, no supraclavicular or cervical lymphadenopathy, trachea midline  Respirations: even and unlabored  Back: midline spine, No CVA tenderness  Abdomen: soft, Non-tender, non-distended, no palpable masses, no rebound or guarding, continent catheterizable umbilical channel  Extremities: moves all equally, no clubbing, cyanosis or edema  Skin: Warm and dry. No lesions  Psych: normal affect  Neuro: Alert and oriented x 3.  Cranial nerves II-XII intact      Urinalysis  2+ LE, nit pos, 50 blood      Assessment:  1. Staghorn calculus  Place in Outpatient - Extended Recovery    Case Request Operating Room: PCNL    Diet NPO    APTT    Protime-INR    Type And Screen Preop    Place sequential compression device    Urine culture    IR Nephrostomy Tube Placement      2. Neurogenic bladder        3. Spina bifida, unspecified hydrocephalus presence, unspecified spinal region        4. Pre-op testing  CBC Auto Differential    Basic Metabolic Panel    Urine culture      5. Solitary kidney, congenital                Plan:   Staghorn calculus  -     Place in Outpatient - Extended Recovery; Standing  -     Case Request Operating Room: PCNL  -     Diet NPO; Standing  -     APTT; Future; Expected date: 03/07/2023  -     Protime-INR; Future; Expected date: 03/07/2023  -     Type And Screen Preop; Future; Expected date: 03/07/2023  -     Place sequential compression device; Standing  -     Urine culture  -     IR Nephrostomy Tube Placement; Future; Expected date: 04/27/2023    Neurogenic bladder    Spina bifida, unspecified hydrocephalus presence, unspecified spinal region    Pre-op testing  -     CBC Auto Differential; Future; Expected date: 03/07/2023  -     Basic Metabolic Panel; Future; Expected date: 03/07/2023  -     Urine culture; Future; Expected date: 09/07/2023    Solitary kidney, congenital    Other orders  -     IP VTE LOW RISK PATIENT; Standing  -     meropenem (MERREM) 1 g in sodium chloride 0.9% 100 mL IVPB      I had a discussion with the patient regarding the logistics of getting her in with a teaching program in , vs PCNL here in [place]. We discussed that her stone burden has grown in the waiting period. She would like to pursue PCNL here, so I will schedule her here on 4/27/23. Urine culture to be done 2 weeks prior so she can start appropriate antibiotics.

## 2023-03-09 ENCOUNTER — TELEPHONE (OUTPATIENT)
Dept: RADIOLOGY | Facility: HOSPITAL | Age: 38
End: 2023-03-09
Payer: MEDICAID

## 2023-03-11 LAB — BACTERIA UR CULT: ABNORMAL

## 2023-03-14 ENCOUNTER — PATIENT MESSAGE (OUTPATIENT)
Dept: SURGERY | Facility: HOSPITAL | Age: 38
End: 2023-03-14
Payer: MEDICAID

## 2023-03-14 DIAGNOSIS — N39.0 RECURRENT UTI: Primary | ICD-10-CM

## 2023-03-15 ENCOUNTER — PATIENT MESSAGE (OUTPATIENT)
Dept: SURGERY | Facility: HOSPITAL | Age: 38
End: 2023-03-15
Payer: MEDICAID

## 2023-03-15 DIAGNOSIS — N20.0 NEPHROLITHIASIS: Primary | ICD-10-CM

## 2023-03-17 ENCOUNTER — PATIENT MESSAGE (OUTPATIENT)
Dept: SURGERY | Facility: HOSPITAL | Age: 38
End: 2023-03-17
Payer: MEDICAID

## 2023-03-17 ENCOUNTER — HOSPITAL ENCOUNTER (OUTPATIENT)
Dept: RADIOLOGY | Facility: HOSPITAL | Age: 38
Discharge: HOME | End: 2023-03-17
Attending: UROLOGY
Payer: MEDICAID

## 2023-03-17 DIAGNOSIS — N20.0 NEPHROLITHIASIS: ICD-10-CM

## 2023-03-17 PROCEDURE — 36573 INSJ PICC RS&I 5 YR+: CPT

## 2023-03-17 PROCEDURE — 36573 INSJ PICC RS&I 5 YR+: CPT | Mod: ,,, | Performed by: RADIOLOGY

## 2023-03-17 PROCEDURE — 36573 FL PICC LINE PLACEMENT W/O PORT, W/IMG > 5 Y/O: ICD-10-PCS | Mod: ,,, | Performed by: RADIOLOGY

## 2023-03-17 NOTE — DISCHARGE SUMMARY
[facility] (Hospital)  Discharge Note  Short Stay    FL PICC Line Placement w/o Port w/ Img > 4 Y/O      OUTCOME: Patient tolerated treatment/procedure well without complication and is now ready for discharge.    DISPOSITION: Home or Self Care    FINAL DIAGNOSIS:  <principal problem not specified>    FOLLOWUP: In clinic    DISCHARGE INSTRUCTIONS:  No discharge procedures on file.     TIME SPENT ON DISCHARGE: 15 minutes    Pre Op Diagnosis: Nephrolithiasis. Recurrent UTI.     Post Op Diagnosis: same     Procedure:  PICC line     Procedure performed by: Jumana RETANA, Brett GANDARA     Written Informed Consent Obtained: Yes     Specimen Removed:  no    Estimated Blood Loss:  minimal     Findings: no     The patient tolerated the procedure well and there were no complications.      Disposition:  F/U in clinic or with ordering physician    Discharge Instructions:  Keep PICC clean and dry.    Sterile technique was performed in the right arm, lidocaine was used as a local anesthetic.  The basilic vein was first accessed successfully using ultrasound guidance and a guide needle. Access was lost while attempting to place the 4 Fr PICC. Ultrasound was used to reassess the right arm for another patent vein. The brachial vein was then identified and successfully accessed. A 4 [place] DLPP 35 cm was inserted into the brachial and into the SVC/Right atrium junction.  Pt tolerated the procedure well without immediate complications. Both puncture sites were dressed with sterile gauze and tegaderm. Hemostasis was achieved.  Please see radiologist report for details. F/u with PCP and/or ordering physician. PICC is ready to use.

## 2023-03-22 ENCOUNTER — PATIENT MESSAGE (OUTPATIENT)
Dept: SURGERY | Facility: HOSPITAL | Age: 38
End: 2023-03-22
Payer: MEDICAID

## 2023-03-23 ENCOUNTER — TELEPHONE (OUTPATIENT)
Dept: UROLOGY | Facility: CLINIC | Age: 38
End: 2023-03-23
Payer: MEDICAID

## 2023-03-23 NOTE — TELEPHONE ENCOUNTER
----- Message from Steph Stark RN sent at 3/23/2023 10:35 AM CDT -----  Regarding: surgery 4/03  Good Morning.    Pt's pre op lab appointments are scheduled for after surgery-4/13/23.  Pt's surgery scheduled for 4/03/23.  Pt will need new lab appointments.    Thank you,    QUE

## 2023-03-30 ENCOUNTER — OFFICE VISIT (OUTPATIENT)
Dept: INTERNAL MEDICINE | Facility: CLINIC | Age: 38
End: 2023-03-30
Payer: MEDICAID

## 2023-03-30 VITALS
SYSTOLIC BLOOD PRESSURE: 137 MMHG | RESPIRATION RATE: 16 BRPM | TEMPERATURE: 98 F | OXYGEN SATURATION: 98 % | HEART RATE: 86 BPM | DIASTOLIC BLOOD PRESSURE: 80 MMHG

## 2023-03-30 DIAGNOSIS — G91.9 HYDROCEPHALUS, UNSPECIFIED TYPE: ICD-10-CM

## 2023-03-30 DIAGNOSIS — N31.9 NEUROGENIC BLADDER: ICD-10-CM

## 2023-03-30 DIAGNOSIS — Q60.0 CONGENITALLY SOLITARY RIGHT KIDNEY: ICD-10-CM

## 2023-03-30 DIAGNOSIS — G47.00 INSOMNIA, UNSPECIFIED TYPE: ICD-10-CM

## 2023-03-30 DIAGNOSIS — Q05.9 SPINA BIFIDA, UNSPECIFIED HYDROCEPHALUS PRESENCE, UNSPECIFIED SPINAL REGION: ICD-10-CM

## 2023-03-30 DIAGNOSIS — Z01.818 PRE-OP EVALUATION: Primary | ICD-10-CM

## 2023-03-30 DIAGNOSIS — N20.0 STAGHORN CALCULUS: ICD-10-CM

## 2023-03-30 DIAGNOSIS — N39.0 CHRONIC UTI: ICD-10-CM

## 2023-03-30 PROCEDURE — 99999 PR PBB SHADOW E&M-EST. PATIENT-LVL II: CPT | Mod: PBBFAC,,,

## 2023-03-30 PROCEDURE — 99999 PR PBB SHADOW E&M-EST. PATIENT-LVL II: ICD-10-PCS | Mod: PBBFAC,,,

## 2023-03-30 PROCEDURE — 99212 OFFICE O/P EST SF 10 MIN: CPT | Mod: PBBFAC

## 2023-03-30 NOTE — ASSESSMENT & PLAN NOTE
Known risk factors for perioperative complications: spina bifida,    Difficulty with intubation is not anticipated.    Cardiac Risk Estimation: Based on the Revised Cardiac Risk index, patient is a Class I risk with a 3.9 % risk of a major cardiac event.    1.) Preoperative workup as follows: ECG, hemoglobin, hematocrit, electrolytes, creatinine, glucose.  2.) Change in medication regimen before surgery: discontinue ASA 6 days before surgery, discontinue NSAIDs 5 days before surgery, hold Metformin 24 hours prior to surgery.  3.) Prophylaxis for cardiac events with perioperative beta-blockers: not indicated.  4.) Invasive hemodynamic monitoring perioperatively: at the discretion of anesthesiologist.  5.) Deep vein thrombosis prophylaxis postoperatively: regimen to be chosen by surgical team.  6.) Surveillance for postoperative MI with ECG immediately postoperatively and on postoperati ve days 1 and 2 AND troponin levels 24 hours postoperatively and on day 4 or hospital discharge (whichever comes first): not indicated.  7.) Current medications which may produce withdrawal symptoms if withheld perioperatively: none  8.) Other measures: Postoperative incentive spirometry to prevent pneumonia.

## 2023-03-30 NOTE — DISCHARGE INSTRUCTIONS
Pre op instructions reviewed with patient during Clinic Visit with Provider. Patient verbalized understanding.    To confirm, Surgery is scheduled on 4/3/23. We will call you late afternoon the business day prior to surgery with your arrival time.    *Please report to the [facility] (1st Floor) located off of [facility] (2nd Entrance/Building on the left, in front of the flag pole).  Address: [address] [place] [address] [place] [address]      INSTRUCTIONS IMPORTANT!!!  Do Not Eat, Drink, or Smoke after 12 midnight unless instructed otherwise by your Surgeon. OK to brush teeth, no gum, candy or mints!      *Take Only these medications with a small sip of water Morning of Surgery as instructed by Madai Edmondson, NP:  None    ____  HOLD all vitamins, herbal supplements, aspirin products & NSAIDS 7 days prior to surgery, as these can thin the blood.  ____  NO Acrylic/fake nails or nail polish worn day of surgery (specifically hand/arm & foot surgeries).  ____  NO powder, lotions, deodorants, oils or cream on body.  ____  Remove all jewelry & piercings prior to surgery.  ____  Remove Dentures, Hearing Aids & Contact Lens prior to surgery.  ____  Bring photo ID and insurance information to hospital (Leave Valuables at Home).  ____  If going home the same day, arrange for a ride home. You will not be able to drive for 24 hrs if Anesthesia was used.   ____  Females (ages 11-60): may need to give a urine sample the morning of surgery; please see Pre op Nurse prior to using the restroom.  ____  Males: Stop ED medications (Viagra, Cialis) 24 hrs prior to surgery.  ____  Wear clean, loose fitting clothing to allow for dressings/ bandages.            Diabetic Patients: If you take diabetic medication, do NOT take morning of surgery unless instructed by Doctor. Metformin to be stopped 24 hrs prior to surgery time. DO NOT take long-acting insulin the evening before surgery. Blood sugars will be checked in pre-op by  Nurse.    Bathing Instructions:    -Shower with anti-bacterial Soap (Hibiclens or Dial) the night before surgery and the morning of.   -Do not use Hibiclens on your face or genitals.   -Apply clean clothes after shower.  -Do not shave your face or body 2 days prior to surgery unless instructed otherwise by your Surgeon.  -Do not shave pubic hair 7 days prior to surgery (gyn pt's).    [facility]/Ride Policy:  Only 2 adults allowed (over the age of 18) to accompany you to the Hospital. You Must have a ride home from a responsible adult that you know and trust. Medical Transport, Uber or Lyft can only be used if patient has a responsible adult to accompany them during ride home.    Discharge Instructions: You will receive Post-op/Discharge instructions by your Discharge Nurse prior to going home. Please call your Surgeon's office with any post-surgery questions/concerns @ [phone].    *If you are running late or have questions the morning of surgery, please call the Surgery Dept @ [phone]  *Insurance/ Financial Questions, please call [phone]    Thank you,  -[facility] Testing Nurse  [phone] or [phone]  M-F 7:30 am-4:00 pm (Closed Major Holidays)

## 2023-03-30 NOTE — PRE-PROCEDURE INSTRUCTIONS
Pre op instructions reviewed with patient during Clinic Visit with Provider. Patient verbalized understanding.    To confirm, Surgery is scheduled on 4/3/23. We will call you late afternoon the business day prior to surgery with your arrival time.    *Please report to the [facility] (1st Floor) located off of [facility] (2nd Entrance/Building on the left, in front of the flag pole).  Address: [address] [place] [address] [place] [address]      INSTRUCTIONS IMPORTANT!!!  Do Not Eat, Drink, or Smoke after 12 midnight unless instructed otherwise by your Surgeon. OK to brush teeth, no gum, candy or mints!      *Take Only these medications with a small sip of water Morning of Surgery as instructed by Madai Edmondson, NP:  None    ____  HOLD all vitamins, herbal supplements, aspirin products & NSAIDS 7 days prior to surgery, as these can thin the blood.  ____  NO Acrylic/fake nails or nail polish worn day of surgery (specifically hand/arm & foot surgeries).  ____  NO powder, lotions, deodorants, oils or cream on body.  ____  Remove all jewelry & piercings prior to surgery.  ____  Remove Dentures, Hearing Aids & Contact Lens prior to surgery.  ____  Bring photo ID and insurance information to hospital (Leave Valuables at Home).  ____  If going home the same day, arrange for a ride home. You will not be able to drive for 24 hrs if Anesthesia was used.   ____  Females (ages 11-60): may need to give a urine sample the morning of surgery; please see Pre op Nurse prior to using the restroom.  ____  Males: Stop ED medications (Viagra, Cialis) 24 hrs prior to surgery.  ____  Wear clean, loose fitting clothing to allow for dressings/ bandages.            Diabetic Patients: If you take diabetic medication, do NOT take morning of surgery unless instructed by Doctor. Metformin to be stopped 24 hrs prior to surgery time. DO NOT take long-acting insulin the evening before surgery. Blood sugars will be checked in pre-op by  Nurse.    Bathing Instructions:    -Shower with anti-bacterial Soap (Hibiclens or Dial) the night before surgery and the morning of.   -Do not use Hibiclens on your face or genitals.   -Apply clean clothes after shower.  -Do not shave your face or body 2 days prior to surgery unless instructed otherwise by your Surgeon.  -Do not shave pubic hair 7 days prior to surgery (gyn pt's).    [facility]/Ride Policy:  Only 2 adults allowed (over the age of 18) to accompany you to the Hospital. You Must have a ride home from a responsible adult that you know and trust. Medical Transport, Uber or Lyft can only be used if patient has a responsible adult to accompany them during ride home.    Discharge Instructions: You will receive Post-op/Discharge instructions by your Discharge Nurse prior to going home. Please call your Surgeon's office with any post-surgery questions/concerns @ [phone].    *If you are running late or have questions the morning of surgery, please call the Surgery Dept @ [phone]  *Insurance/ Financial Questions, please call [phone]    Thank you,  -[facility] Nurse  [phone] or [phone]  M-F 7:30 am-4:00 pm (Closed Major Holidays)

## 2023-03-30 NOTE — ASSESSMENT & PLAN NOTE
- hx of spina bifida, hydrocephalus with  shut placed as a child, congenital solitary right kidney

## 2023-03-30 NOTE — ASSESSMENT & PLAN NOTE
- hospitalized in Dec 2022 with pyelonephritis and bacteremia with very resistant ESBL E. Coli  - currently with RUE PICC line, ertapenem q 24 hours

## 2023-04-03 ENCOUNTER — ANESTHESIA EVENT (OUTPATIENT)
Dept: SURGERY | Facility: HOSPITAL | Age: 38
End: 2023-04-03
Payer: COMMERCIAL

## 2023-04-03 ENCOUNTER — ANESTHESIA (OUTPATIENT)
Dept: SURGERY | Facility: HOSPITAL | Age: 38
End: 2023-04-03
Payer: COMMERCIAL

## 2023-04-03 ENCOUNTER — HOSPITAL ENCOUNTER (OUTPATIENT)
Facility: HOSPITAL | Age: 38
Discharge: HOME | End: 2023-04-07
Attending: UROLOGY | Admitting: UROLOGY
Payer: MEDICAID

## 2023-04-03 DIAGNOSIS — N20.0 STAGHORN CALCULUS: Primary | ICD-10-CM

## 2023-04-03 DIAGNOSIS — N39.0 CHRONIC UTI: ICD-10-CM

## 2023-04-03 LAB
ABO + RH BLD: NORMAL
B-HCG UR QL: NEGATIVE
BLD GP AB SCN CELLS X3 SERPL QL: NORMAL
CTP QC/QA: YES
SPECIMEN OUTDATE: NORMAL

## 2023-04-03 PROCEDURE — 88300 SURGICAL PATH GROSS: CPT | Mod: 26,,, | Performed by: PATHOLOGY

## 2023-04-03 PROCEDURE — C1729 CATH, DRAINAGE: HCPCS | Performed by: UROLOGY

## 2023-04-03 PROCEDURE — C1769 GUIDE WIRE: HCPCS | Performed by: UROLOGY

## 2023-04-03 PROCEDURE — 63600175 PHARM REV CODE 636 W HCPCS: Performed by: ANESTHESIOLOGY

## 2023-04-03 PROCEDURE — 63600175 PHARM REV CODE 636 W HCPCS: Performed by: NURSE ANESTHETIST, CERTIFIED REGISTERED

## 2023-04-03 PROCEDURE — 21400001 HC TELEMETRY ROOM

## 2023-04-03 PROCEDURE — C1773 RET DEV, INSERTABLE: HCPCS | Performed by: UROLOGY

## 2023-04-03 PROCEDURE — G0378 HOSPITAL OBSERVATION PER HR: HCPCS

## 2023-04-03 PROCEDURE — 25000003 PHARM REV CODE 250: Performed by: NURSE ANESTHETIST, CERTIFIED REGISTERED

## 2023-04-03 PROCEDURE — 25000003 PHARM REV CODE 250

## 2023-04-03 PROCEDURE — C1758 CATHETER, URETERAL: HCPCS | Performed by: UROLOGY

## 2023-04-03 PROCEDURE — 87070 CULTURE OTHR SPECIMN AEROBIC: CPT | Performed by: UROLOGY

## 2023-04-03 PROCEDURE — 00862 ANES XTRPRTL LWR ABD RNL PX: CPT | Performed by: UROLOGY

## 2023-04-03 PROCEDURE — 82365 CALCULUS SPECTROSCOPY: CPT | Performed by: UROLOGY

## 2023-04-03 PROCEDURE — C1726 CATH, BAL DIL, NON-VASCULAR: HCPCS | Performed by: UROLOGY

## 2023-04-03 PROCEDURE — 63600175 PHARM REV CODE 636 W HCPCS: Performed by: UROLOGY

## 2023-04-03 PROCEDURE — 37000008 HC ANESTHESIA 1ST 15 MINUTES: Performed by: UROLOGY

## 2023-04-03 PROCEDURE — 88300 SURGICAL PATH GROSS: CPT | Performed by: PATHOLOGY

## 2023-04-03 PROCEDURE — 50081 PERQ NL/PL LITHOTRP CPLX>2CM: CPT | Mod: ,,, | Performed by: UROLOGY

## 2023-04-03 PROCEDURE — 88300 PR  SURG PATH,GROSS,LEVEL I: ICD-10-PCS | Mod: 26,,, | Performed by: PATHOLOGY

## 2023-04-03 PROCEDURE — 37000009 HC ANESTHESIA EA ADD 15 MINS: Performed by: UROLOGY

## 2023-04-03 PROCEDURE — 36000707: Performed by: UROLOGY

## 2023-04-03 PROCEDURE — 86900 BLOOD TYPING SEROLOGIC ABO: CPT | Performed by: UROLOGY

## 2023-04-03 PROCEDURE — 87205 SMEAR GRAM STAIN: CPT | Performed by: UROLOGY

## 2023-04-03 PROCEDURE — 50081 PR REMV KID STONE, 2+ CM, PERC: ICD-10-PCS | Mod: ,,, | Performed by: UROLOGY

## 2023-04-03 PROCEDURE — 25000003 PHARM REV CODE 250: Performed by: ANESTHESIOLOGY

## 2023-04-03 PROCEDURE — 71000033 HC RECOVERY, INTIAL HOUR: Performed by: UROLOGY

## 2023-04-03 PROCEDURE — C1725 CATH, TRANSLUMIN NON-LASER: HCPCS | Performed by: UROLOGY

## 2023-04-03 PROCEDURE — 81025 URINE PREGNANCY TEST: CPT | Performed by: UROLOGY

## 2023-04-03 PROCEDURE — 94761 N-INVAS EAR/PLS OXIMETRY MLT: CPT

## 2023-04-03 PROCEDURE — 27201423 OPTIME MED/SURG SUP & DEVICES STERILE SUPPLY: Performed by: UROLOGY

## 2023-04-03 PROCEDURE — 25500020 PHARM REV CODE 255: Performed by: UROLOGY

## 2023-04-03 PROCEDURE — 63600175 PHARM REV CODE 636 W HCPCS

## 2023-04-03 PROCEDURE — 36000706: Performed by: UROLOGY

## 2023-04-03 PROCEDURE — C1894 INTRO/SHEATH, NON-LASER: HCPCS | Performed by: UROLOGY

## 2023-04-03 PROCEDURE — 25000003 PHARM REV CODE 250: Performed by: NURSE PRACTITIONER

## 2023-04-03 RX ORDER — ACETAMINOPHEN 325 MG/1
650 TABLET ORAL EVERY 6 HOURS PRN
Status: DISCONTINUED | OUTPATIENT
Start: 2023-04-03 | End: 2023-04-06

## 2023-04-03 RX ORDER — ONDANSETRON 2 MG/ML
4 INJECTION INTRAMUSCULAR; INTRAVENOUS DAILY PRN
Status: DISCONTINUED | OUTPATIENT
Start: 2023-04-03 | End: 2023-04-03 | Stop reason: HOSPADM

## 2023-04-03 RX ORDER — ACETAMINOPHEN 10 MG/ML
INJECTION, SOLUTION INTRAVENOUS
Status: DISCONTINUED | OUTPATIENT
Start: 2023-04-03 | End: 2023-04-03

## 2023-04-03 RX ORDER — SODIUM CHLORIDE 0.9 % (FLUSH) 0.9 %
3 SYRINGE (ML) INJECTION
Status: DISCONTINUED | OUTPATIENT
Start: 2023-04-03 | End: 2023-04-07 | Stop reason: HOSPADM

## 2023-04-03 RX ORDER — DEXAMETHASONE SODIUM PHOSPHATE 4 MG/ML
INJECTION, SOLUTION INTRA-ARTICULAR; INTRALESIONAL; INTRAMUSCULAR; INTRAVENOUS; SOFT TISSUE
Status: DISCONTINUED | OUTPATIENT
Start: 2023-04-03 | End: 2023-04-03

## 2023-04-03 RX ORDER — DIPHENHYDRAMINE HYDROCHLORIDE 50 MG/ML
25 INJECTION INTRAMUSCULAR; INTRAVENOUS EVERY 6 HOURS PRN
Status: DISCONTINUED | OUTPATIENT
Start: 2023-04-03 | End: 2023-04-03 | Stop reason: HOSPADM

## 2023-04-03 RX ORDER — PROPOFOL 10 MG/ML
VIAL (ML) INTRAVENOUS
Status: DISCONTINUED | OUTPATIENT
Start: 2023-04-03 | End: 2023-04-03

## 2023-04-03 RX ORDER — OXYCODONE HYDROCHLORIDE 5 MG/1
5 TABLET ORAL
Status: DISCONTINUED | OUTPATIENT
Start: 2023-04-03 | End: 2023-04-03 | Stop reason: HOSPADM

## 2023-04-03 RX ORDER — MEROPENEM AND SODIUM CHLORIDE 1 G/50ML
1 INJECTION, SOLUTION INTRAVENOUS
Status: COMPLETED | OUTPATIENT
Start: 2023-04-03 | End: 2023-04-03

## 2023-04-03 RX ORDER — FENTANYL CITRATE 50 UG/ML
INJECTION, SOLUTION INTRAMUSCULAR; INTRAVENOUS
Status: DISCONTINUED | OUTPATIENT
Start: 2023-04-03 | End: 2023-04-03

## 2023-04-03 RX ORDER — KETOROLAC TROMETHAMINE 30 MG/ML
15 INJECTION, SOLUTION INTRAMUSCULAR; INTRAVENOUS EVERY 8 HOURS PRN
Status: DISCONTINUED | OUTPATIENT
Start: 2023-04-03 | End: 2023-04-03 | Stop reason: HOSPADM

## 2023-04-03 RX ORDER — MEROPENEM AND SODIUM CHLORIDE 500 MG/50ML
500 INJECTION, SOLUTION INTRAVENOUS
Status: DISCONTINUED | OUTPATIENT
Start: 2023-04-03 | End: 2023-04-03

## 2023-04-03 RX ORDER — ALBUTEROL SULFATE 0.83 MG/ML
2.5 SOLUTION RESPIRATORY (INHALATION) EVERY 4 HOURS PRN
Status: DISCONTINUED | OUTPATIENT
Start: 2023-04-03 | End: 2023-04-03 | Stop reason: HOSPADM

## 2023-04-03 RX ORDER — ONDANSETRON 2 MG/ML
INJECTION INTRAMUSCULAR; INTRAVENOUS
Status: DISCONTINUED | OUTPATIENT
Start: 2023-04-03 | End: 2023-04-03

## 2023-04-03 RX ORDER — LIDOCAINE HYDROCHLORIDE 10 MG/ML
INJECTION, SOLUTION EPIDURAL; INFILTRATION; INTRACAUDAL; PERINEURAL
Status: DISCONTINUED | OUTPATIENT
Start: 2023-04-03 | End: 2023-04-03

## 2023-04-03 RX ORDER — HYDROMORPHONE HYDROCHLORIDE 2 MG/ML
0.2 INJECTION, SOLUTION INTRAMUSCULAR; INTRAVENOUS; SUBCUTANEOUS EVERY 5 MIN PRN
Status: DISCONTINUED | OUTPATIENT
Start: 2023-04-03 | End: 2023-04-03 | Stop reason: HOSPADM

## 2023-04-03 RX ORDER — ROCURONIUM BROMIDE 10 MG/ML
INJECTION, SOLUTION INTRAVENOUS
Status: DISCONTINUED | OUTPATIENT
Start: 2023-04-03 | End: 2023-04-03

## 2023-04-03 RX ORDER — MEROPENEM AND SODIUM CHLORIDE 1 G/50ML
1 INJECTION, SOLUTION INTRAVENOUS
Status: DISCONTINUED | OUTPATIENT
Start: 2023-04-03 | End: 2023-04-03 | Stop reason: DRUGHIGH

## 2023-04-03 RX ADMIN — ONDANSETRON 4 MG: 2 INJECTION INTRAMUSCULAR; INTRAVENOUS at 05:04

## 2023-04-03 RX ADMIN — ACETAMINOPHEN 1000 MG: 10 INJECTION, SOLUTION INTRAVENOUS at 05:04

## 2023-04-03 RX ADMIN — OXYCODONE HYDROCHLORIDE 5 MG: 5 TABLET ORAL at 06:04

## 2023-04-03 RX ADMIN — HYDROMORPHONE HYDROCHLORIDE 0.2 MG: 2 INJECTION INTRAMUSCULAR; INTRAVENOUS; SUBCUTANEOUS at 05:04

## 2023-04-03 RX ADMIN — FENTANYL CITRATE 25 MCG: 50 INJECTION, SOLUTION INTRAMUSCULAR; INTRAVENOUS at 02:04

## 2023-04-03 RX ADMIN — DEXAMETHASONE SODIUM PHOSPHATE 8 MG: 4 INJECTION, SOLUTION INTRA-ARTICULAR; INTRALESIONAL; INTRAMUSCULAR; INTRAVENOUS; SOFT TISSUE at 02:04

## 2023-04-03 RX ADMIN — FENTANYL CITRATE 25 MCG: 50 INJECTION, SOLUTION INTRAMUSCULAR; INTRAVENOUS at 03:04

## 2023-04-03 RX ADMIN — ONDANSETRON 4 MG: 2 INJECTION INTRAMUSCULAR; INTRAVENOUS at 02:04

## 2023-04-03 RX ADMIN — ROCURONIUM BROMIDE 50 MG: 10 INJECTION, SOLUTION INTRAVENOUS at 02:04

## 2023-04-03 RX ADMIN — SUGAMMADEX 200 MG: 100 INJECTION, SOLUTION INTRAVENOUS at 05:04

## 2023-04-03 RX ADMIN — ROCURONIUM BROMIDE 15 MG: 10 INJECTION, SOLUTION INTRAVENOUS at 03:04

## 2023-04-03 RX ADMIN — MEROPENEM AND SODIUM CHLORIDE 1 G: 1 INJECTION, SOLUTION INTRAVENOUS at 02:04

## 2023-04-03 RX ADMIN — KETOROLAC TROMETHAMINE 15 MG: 30 INJECTION, SOLUTION INTRAMUSCULAR; INTRAVENOUS at 05:04

## 2023-04-03 RX ADMIN — SODIUM CHLORIDE, SODIUM LACTATE, POTASSIUM CHLORIDE, AND CALCIUM CHLORIDE: .6; .31; .03; .02 INJECTION, SOLUTION INTRAVENOUS at 02:04

## 2023-04-03 RX ADMIN — LIDOCAINE HYDROCHLORIDE 50 MG: 10 INJECTION, SOLUTION EPIDURAL; INFILTRATION; INTRACAUDAL; PERINEURAL at 02:04

## 2023-04-03 RX ADMIN — ACETAMINOPHEN 650 MG: 325 TABLET ORAL at 11:04

## 2023-04-03 RX ADMIN — PROPOFOL 100 MG: 10 INJECTION, EMULSION INTRAVENOUS at 02:04

## 2023-04-03 NOTE — INTERVAL H&P NOTE
The patient has been examined and the H&P has been reviewed:    I concur with the findings and changes have been noted since the H&P was written: Pt has received 2 weeks of culture specific IV Ertapenem    Surgery risks, benefits and alternative options discussed and understood by patient/family.          There are no hospital problems to display for this patient.

## 2023-04-03 NOTE — ANESTHESIA PROCEDURE NOTES
Intubation    Date/Time: 4/3/2023 2:20 PM  Performed by: Fawn Hampton CRNA  Authorized by: Isidoro Bonner MD     Intubation:     Induction:  Intravenous    Intubated:  Postinduction    Mask Ventilation:  Easy mask    Attempts:  1    Attempted By:  CRNA    Method of Intubation:  Direct    Blade:  [place] 3    Laryngeal View Grade: Grade I - full view of cords      Difficult Airway Encountered?: No      Complications:  None    Airway Device:  Oral endotracheal tube    Airway Device Size:  7.5    Style/Cuff Inflation:  Cuffed (inflated to minimal occlusive pressure)    Tube secured:  19    Secured at:  The lips    Placement Verified By:  Capnometry and Revisualization with laryngoscopy    Complicating Factors:  None    Findings Post-Intubation:  BS equal bilateral and atraumatic/condition of teeth unchanged

## 2023-04-03 NOTE — ANESTHESIA PREPROCEDURE EVALUATION
04/03/2023  Addis Delvalle is a 37 y.o., female.    Patient Active Problem List   Diagnosis    Neurogenic bladder    Chronic UTI    Staghorn calculus    Spina bifida    Congenitally solitary right kidney    Pre-op evaluation    Insomnia    Hydrocephalus     Pre-op Assessment    I have reviewed the Patient Summary Reports.     I have reviewed the Nursing Notes. I have reviewed the NPO Status.   I have reviewed the Medications.     Review of Systems  Anesthesia Hx:  No problems with previous Anesthesia  Denies Family Hx of Anesthesia complications.   Denies Personal Hx of Anesthesia complications.   Hematology/Oncology:  Hematology Normal   Oncology Normal     EENT/Dental:EENT/Dental Normal   Cardiovascular:  Cardiovascular Normal  ECG has been reviewed. Cleared by internist   Pulmonary:  Pulmonary Normal    Renal/:   Chronic Renal Disease Solitary kidney  Staghorn calculus   Hepatic/GI:  Hepatic/GI Normal    Musculoskeletal:  Musculoskeletal Normal    Neurological:  Neurology Normal Climbs stairs without difficulty   shunt   Endocrine:  Endocrine Normal  Obesity / BMI > 30  Dermatological:  Skin Normal    Psych:  Psychiatric Normal           Physical Exam  General: Alert and Oriented    Airway:  Mallampati: II   Mouth Opening: Normal  TM Distance: Normal  Tongue: Normal  Neck ROM: Normal ROM        Anesthesia Plan  Type of Anesthesia, risks & benefits discussed:    Anesthesia Type: Gen ETT  Intra-op Monitoring Plan: Standard ASA Monitors  Induction:  IV  Informed Consent: Informed consent signed with the Patient and all parties understand the risks and agree with anesthesia plan.  All questions answered.   ASA Score: 3  Day of Surgery Review of History & Physical: I have interviewed and examined the patient. I have reviewed the patient's H&P dated:     Ready For Surgery From Anesthesia  Perspective.     .

## 2023-04-03 NOTE — PLAN OF CARE
Per Dr. Ghosh and Dr. Bonner, PT/INR, T&S, and calderon catheter are not indicated at this time. Please see last lab values 3/30

## 2023-04-03 NOTE — PROGRESS NOTES
Pharmacist Renal Dose Adjustment Note    Addis Delvalle is a 37 y.o. female being treated with the medication merrem    Patient Data:    Vital Signs (Most Recent):  Temp: 98.4 °F (36.9 °C) (04/03/23 1121)  Pulse: 84 (04/03/23 1121)  Resp: 18 (04/03/23 1121)  BP: (!) 153/79 (04/03/23 1121)  SpO2: 100 % (04/03/23 1121)   Vital Signs (72h Range):  Temp:  [98.4 °F (36.9 °C)]   Pulse:  [84]   Resp:  [18]   BP: (153)/(79)   SpO2:  [100 %]      Recent Labs   Lab 03/30/23  0858   CREATININE 0.9     Serum creatinine: 0.9 mg/dL 03/30/23 0858  Estimated creatinine clearance: 81.7 mL/min    Medication:merrem 1000mg every 8 hours will be changed to merrem 500mg every 6 hours for crcl >50 ml/min for surgical ppx     Pharmacist's Name: Yolanda Downing  Pharmacist's Extension: [phone]

## 2023-04-03 NOTE — TRANSFER OF CARE
"Anesthesia Transfer of Care Note    Patient: Addis Delvalle    Procedure(s) Performed: Procedure(s) (LRB):  PCNL (Right)    Patient location: PACU    Anesthesia Type: general    Transport from OR: Transported from OR on room air with adequate spontaneous ventilation    Post pain: adequate analgesia    Post assessment: no apparent anesthetic complications    Post vital signs: stable    Level of consciousness: responds to stimulation    Nausea/Vomiting: no nausea/vomiting    Complications: none    Transfer of care protocol was followed      Last vitals:   Visit Vitals  /76 (BP Location: Right arm, Patient Position: Lying)   Pulse 88   Temp 36.9 °C (98.4 °F) (Temporal)   Resp 18   Ht 4' 11" (1.499 m)   Wt 78 kg (172 lb 1.1 oz)   SpO2 97%   Breastfeeding No   BMI 34.75 kg/m²     "

## 2023-04-03 NOTE — OP NOTE
[facility]  Operative Note    Date: 04/03/2023    Pre-Op Diagnosis: Right renal stone    Post-Op Diagnosis: same    Procedure(s) Performed:   1.  Right PCNL (size>2cm)  2.  Dilation of Right nephrostomy tract  3.  Right nephrostomy tube placement  4.  Right antegrade nephrostogram  5.  Fluoro < 1 h    Specimen(s): Right renal stone for analysis and stone culture    Staff Surgeon: Merle Reed MD    Assistant Surgeon: none    Anesthesia: General endotracheal anesthesia    Indications: Addis Delvalle is a 37 y.o. female with a solitary kidney and a large burden of right renal stones presenting for definitive management.      Findings:  1.  Numerous right renal stone broken with the lithoclast and removed. The lower pole stone was removed and perinephric fat was visualized once the stone was removed. There was some lower pole contrast extravasation on final nephrostogram.     Estimated Blood Loss: 50cc    Drains:   1.  22 Fr nephrostomy tube with pollock catheter down the ureter  2.  14 Fr urethral calderon catheter      Procedure in detail:  After the risks, benefits and possible complications of the procedure were explained, the patient elected to undergo the above procedures and informed consent was obtained. The patient was taken to the IR suite and lower pole access was obtained with a nephroureteral catheter.       She was then brought to the OR. She underwent general endotracheal anesthesia. A calderon catheter was placed. She was placed in the prone position with all pressure points padded.   Pre-operative antibiotics were administered. Time out was performed. SCDs were applied and working prior to the procedure.    Her right flank was prepped and draped in the usual sterile fashion.   A 12mm incision was made next to the nephroureteral catheter using an 11 blade. I advanced a motion wire through the catheter to the distal ureter. A dual lumen ureteral access sheath was advanced over the  wire to the renal pelvis.  Antegrade nephrostogram was performed confirming correct position of the access sheath. A glidewire was placed into the access sheath and into the ureter down to the bladder. Fluoroscopy was used to confirm correct wire placement in the bladder. The access sheath was removed. A 30 fr dilator balloon was advanced over the wire and the tip of the balloon was placed into the renal pelvis, confirmed with fluoroscopy. The balloon was dilated and the sheath was advanced over the balloon. The balloon was deflated and removed, leaving both wires in place.     The nephroscope, which had been previously assembled, was advanced into the sheath. The UPJ was identified. The stones was identified. This was broken up using the Lithoclast device. There were numerous very dense stones, and many fragments were removed with foreign body graspers. Once the anterior lower pole stone was removed, it was evident that perinephric fat was visible where the stone had been located.     Flexible pyeloscopy was then performed to evaluate all the calyces. There was one large mid-calyceal stone that I was not able to identify at the time of the procedure, but was evident on fluoroscopy.      A 22 Fr Mi'kmaq tipped calderon catheter was placed into the collecting system as a nephrostomy tube with a pollock catheter going down the ureter.  Antegrade nephrostogram was performed showing lower pole extravasation of contrast and filling defect in the mid-pole.  3 mL of water was placed in the balloon.     The sheath was removed over the catheter leaving the nephrotomy tube in place.   The nephrostomy tube was secured to the skin using a 2-0 prolene. The safety glidewire was also left in place and secured to her skin.      A sterile compressive dressing was placed. The patient was then transferred back to PACU in stable condition.    Disposition:  The patient will be admitted to the floor for postoperative monitoring.      Merle  RAHEEM Reed MD

## 2023-04-04 LAB
ANION GAP SERPL CALC-SCNC: 10 MMOL/L (ref 8–16)
BASOPHILS # BLD AUTO: 0.02 K/UL (ref 0–0.2)
BASOPHILS NFR BLD: 0.1 % (ref 0–1.9)
BUN SERPL-MCNC: 14 MG/DL (ref 6–20)
CALCIUM SERPL-MCNC: 8.9 MG/DL (ref 8.7–10.5)
CHLORIDE SERPL-SCNC: 106 MMOL/L (ref 95–110)
CO2 SERPL-SCNC: 21 MMOL/L (ref 23–29)
CREAT SERPL-MCNC: 1 MG/DL (ref 0.5–1.4)
DIFFERENTIAL METHOD: ABNORMAL
EOSINOPHIL # BLD AUTO: 0 K/UL (ref 0–0.5)
EOSINOPHIL NFR BLD: 0 % (ref 0–8)
ERYTHROCYTE [DISTWIDTH] IN BLOOD BY AUTOMATED COUNT: 13.3 % (ref 11.5–14.5)
EST. GFR  (NO RACE VARIABLE): >60 ML/MIN/1.73 M^2
GLUCOSE SERPL-MCNC: 130 MG/DL (ref 70–110)
HCT VFR BLD AUTO: 34.2 % (ref 37–48.5)
HGB BLD-MCNC: 10.6 G/DL (ref 12–16)
IMM GRANULOCYTES # BLD AUTO: 0.07 K/UL (ref 0–0.04)
IMM GRANULOCYTES NFR BLD AUTO: 0.4 % (ref 0–0.5)
LYMPHOCYTES # BLD AUTO: 1.1 K/UL (ref 1–4.8)
LYMPHOCYTES NFR BLD: 6.2 % (ref 18–48)
MCH RBC QN AUTO: 26.9 PG (ref 27–31)
MCHC RBC AUTO-ENTMCNC: 31 G/DL (ref 32–36)
MCV RBC AUTO: 87 FL (ref 82–98)
MONOCYTES # BLD AUTO: 0.7 K/UL (ref 0.3–1)
MONOCYTES NFR BLD: 4.2 % (ref 4–15)
NEUTROPHILS # BLD AUTO: 15.7 K/UL (ref 1.8–7.7)
NEUTROPHILS NFR BLD: 89.1 % (ref 38–73)
NRBC BLD-RTO: 0 /100 WBC
PLATELET # BLD AUTO: 322 K/UL (ref 150–450)
PMV BLD AUTO: 10 FL (ref 9.2–12.9)
POTASSIUM SERPL-SCNC: 4.6 MMOL/L (ref 3.5–5.1)
RBC # BLD AUTO: 3.94 M/UL (ref 4–5.4)
SODIUM SERPL-SCNC: 137 MMOL/L (ref 136–145)
WBC # BLD AUTO: 17.61 K/UL (ref 3.9–12.7)

## 2023-04-04 PROCEDURE — 96366 THER/PROPH/DIAG IV INF ADDON: CPT

## 2023-04-04 PROCEDURE — 21400001 HC TELEMETRY ROOM

## 2023-04-04 PROCEDURE — 94799 UNLISTED PULMONARY SVC/PX: CPT

## 2023-04-04 PROCEDURE — 96367 TX/PROPH/DG ADDL SEQ IV INF: CPT

## 2023-04-04 PROCEDURE — 85025 COMPLETE CBC W/AUTO DIFF WBC: CPT | Performed by: UROLOGY

## 2023-04-04 PROCEDURE — 96376 TX/PRO/DX INJ SAME DRUG ADON: CPT

## 2023-04-04 PROCEDURE — 96375 TX/PRO/DX INJ NEW DRUG ADDON: CPT

## 2023-04-04 PROCEDURE — 80048 BASIC METABOLIC PNL TOTAL CA: CPT | Performed by: UROLOGY

## 2023-04-04 PROCEDURE — 36415 COLL VENOUS BLD VENIPUNCTURE: CPT | Performed by: UROLOGY

## 2023-04-04 PROCEDURE — 96361 HYDRATE IV INFUSION ADD-ON: CPT

## 2023-04-04 PROCEDURE — G0378 HOSPITAL OBSERVATION PER HR: HCPCS

## 2023-04-04 PROCEDURE — 63600175 PHARM REV CODE 636 W HCPCS: Performed by: UROLOGY

## 2023-04-04 PROCEDURE — 27000207 HC ISOLATION

## 2023-04-04 PROCEDURE — 94761 N-INVAS EAR/PLS OXIMETRY MLT: CPT

## 2023-04-04 PROCEDURE — 25000003 PHARM REV CODE 250: Performed by: UROLOGY

## 2023-04-04 PROCEDURE — 99900035 HC TECH TIME PER 15 MIN (STAT)

## 2023-04-04 RX ORDER — MEROPENEM AND SODIUM CHLORIDE 500 MG/50ML
500 INJECTION, SOLUTION INTRAVENOUS
Status: DISCONTINUED | OUTPATIENT
Start: 2023-04-04 | End: 2023-04-07

## 2023-04-04 RX ORDER — TRAZODONE HYDROCHLORIDE 50 MG/1
50 TABLET ORAL NIGHTLY
Status: DISCONTINUED | OUTPATIENT
Start: 2023-04-04 | End: 2023-04-07 | Stop reason: HOSPADM

## 2023-04-04 RX ORDER — HYDROCODONE BITARTRATE AND ACETAMINOPHEN 5; 325 MG/1; MG/1
1 TABLET ORAL EVERY 4 HOURS PRN
Status: DISCONTINUED | OUTPATIENT
Start: 2023-04-04 | End: 2023-04-05

## 2023-04-04 RX ORDER — MORPHINE SULFATE 4 MG/ML
3 INJECTION, SOLUTION INTRAMUSCULAR; INTRAVENOUS
Status: DISCONTINUED | OUTPATIENT
Start: 2023-04-04 | End: 2023-04-04

## 2023-04-04 RX ORDER — HYDROCODONE BITARTRATE AND ACETAMINOPHEN 10; 325 MG/1; MG/1
1 TABLET ORAL EVERY 4 HOURS PRN
Status: DISCONTINUED | OUTPATIENT
Start: 2023-04-04 | End: 2023-04-05

## 2023-04-04 RX ORDER — ONDANSETRON 2 MG/ML
4 INJECTION INTRAMUSCULAR; INTRAVENOUS EVERY 8 HOURS PRN
Status: DISCONTINUED | OUTPATIENT
Start: 2023-04-04 | End: 2023-04-07 | Stop reason: HOSPADM

## 2023-04-04 RX ORDER — SODIUM CHLORIDE 9 MG/ML
INJECTION, SOLUTION INTRAVENOUS CONTINUOUS
Status: DISCONTINUED | OUTPATIENT
Start: 2023-04-04 | End: 2023-04-07 | Stop reason: HOSPADM

## 2023-04-04 RX ADMIN — TRAZODONE HYDROCHLORIDE 50 MG: 50 TABLET ORAL at 08:04

## 2023-04-04 RX ADMIN — SODIUM CHLORIDE: 9 INJECTION, SOLUTION INTRAVENOUS at 04:04

## 2023-04-04 RX ADMIN — ONDANSETRON 4 MG: 2 INJECTION INTRAMUSCULAR; INTRAVENOUS at 07:04

## 2023-04-04 RX ADMIN — MEROPENEM AND SODIUM CHLORIDE 500 MG: 500 INJECTION, SOLUTION INTRAVENOUS at 08:04

## 2023-04-04 RX ADMIN — MORPHINE SULFATE 3 MG: 4 INJECTION INTRAVENOUS at 04:04

## 2023-04-04 RX ADMIN — HYDROCODONE BITARTRATE AND ACETAMINOPHEN 1 TABLET: 10; 325 TABLET ORAL at 09:04

## 2023-04-04 RX ADMIN — HYDROCODONE BITARTRATE AND ACETAMINOPHEN 1 TABLET: 10; 325 TABLET ORAL at 04:04

## 2023-04-04 RX ADMIN — HYDROCODONE BITARTRATE AND ACETAMINOPHEN 1 TABLET: 5; 325 TABLET ORAL at 12:04

## 2023-04-04 RX ADMIN — MEROPENEM AND SODIUM CHLORIDE 500 MG: 500 INJECTION, SOLUTION INTRAVENOUS at 04:04

## 2023-04-04 RX ADMIN — MORPHINE SULFATE 3 MG: 4 INJECTION INTRAVENOUS at 07:04

## 2023-04-04 NOTE — PLAN OF CARE
Pt up w/PT to ambulate in noel.  Tolerated well.  Admin Norco 5 and later Norco 10 for incisional pain.  Nephrostomy tube output pink-tinged; 750 cc out.  One bout of nausea treated w/Zofran.  See MAR for admin times of meds.  Appetite good.  Pt ordering supper via [employer].  Marcy Correia NP at bedside.  SHIMA.  YENI.

## 2023-04-04 NOTE — ANESTHESIA POSTPROCEDURE EVALUATION
Anesthesia Post Evaluation    Patient: Addis Delvalle    Procedure(s) Performed: Procedure(s) (LRB):  PCNL (Right)    Final Anesthesia Type: general      Patient location during evaluation: PACU  Patient participation: Yes- Able to Participate  Level of consciousness: awake  Post-procedure vital signs: reviewed and stable  Pain management: adequate  Airway patency: patent    PONV status at discharge: No PONV  Anesthetic complications: no      Cardiovascular status: hemodynamically stable  Respiratory status: unassisted  Hydration status: euvolemic  Follow-up not needed.          Vitals Value Taken Time   /76 04/04/23 1531   Temp 36.7 °C (98.1 °F) 04/04/23 1531   Pulse 73 04/04/23 1531   Resp 18 04/04/23 1656   SpO2 96 % 04/04/23 1531         Event Time   Out of Recovery 18:20:00         Pain/Carlos Score: Pain Rating Prior to Med Admin: 10 (4/4/2023  4:56 PM)  Pain Rating Post Med Admin: 1 (4/4/2023  1:15 PM)  Carlos Score: 10 (4/3/2023  6:15 PM)

## 2023-04-04 NOTE — CONSULTS
[facility]  Consult Note    Patient Name: Addis Delvalle  MRN: [id number]  Admission Date: 4/3/2023  Hospital Length of Stay: 1 days  Attending Physician: Franky Chavez MD  Primary Care Provider: aMdie Horn MD           Patient information was obtained from patient and ER records.     Consults  Subjective:     Principal Problem: Staghorn calculus    Chief Complaint: No chief complaint on file.       HPI: Patient is a 36 y/o  female with a PMH of staghorn calculus, ESBL UTI, neurogenic bladder, spina bifida, insomnia who is s/p right nephrostomy tube placement along with right percutaneous lithotripsy consulted for medical management. Patient tolerated procedure well. Pain is controlled however she does not like the morphine as it makes her nauseous. Patient is currently on empiric merrem ordered by primary team.       Past Medical History:   Diagnosis Date    Spina bifida     Spina bifida        Past Surgical History:   Procedure Laterality Date    BLADDER AUGMENTATION      CSF SHUNT      INSERTION OF SUPRAPUBIC CATHETER  1990       Review of patient's allergies indicates:   Allergen Reactions    Cefaclor     Cephalexin     Meperidine      vomiting    Promethazine     Red dye        No current facility-administered medications on file prior to encounter.     Current Outpatient Medications on File Prior to Encounter   Medication Sig    traZODone (DESYREL) 50 MG tablet Take 1 tablet (50 mg total) by mouth every evening.    ISIBLOOM 0.15-0.03 mg per tablet TAKE 1 TABLET DAILY FOR BIRTH CONTROL    triamcinolone acetonide 0.1% (KENALOG) 0.1 % ointment Apply topically 2 (two) times daily.     Family History       Problem Relation (Age of Onset)    Diabetes Father, Paternal Grandmother    Graves' disease Mother    Hyperlipidemia Mother, Father    Hypertension Mother    Stroke Paternal Grandmother          Tobacco Use    Smoking status: Never    Smokeless tobacco:  Never   Substance and Sexual Activity    Alcohol use: Yes     Comment: Occaisonally    Drug use: Never    Sexual activity: Never     Review of Systems   Constitutional:  Negative for fatigue and fever.   HENT:  Negative for sinus pressure.    Eyes:  Negative for visual disturbance.   Respiratory:  Negative for shortness of breath.    Cardiovascular:  Negative for chest pain.   Gastrointestinal:  Negative for nausea and vomiting.   Genitourinary:  Positive for flank pain. Negative for difficulty urinating.   Musculoskeletal:  Negative for back pain.   Skin:  Negative for rash.   Neurological:  Negative for headaches.   Psychiatric/Behavioral:  Negative for confusion.    Objective:     Vital Signs (Most Recent):  Temp: 98 °F (36.7 °C) (04/04/23 1217)  Pulse: 86 (04/04/23 1217)  Resp: 18 (04/04/23 1217)  BP: 137/65 (04/04/23 1217)  SpO2: 98 % (04/04/23 1217) Vital Signs (24h Range):  Temp:  [97.6 °F (36.4 °C)-98.1 °F (36.7 °C)] 98 °F (36.7 °C)  Pulse:  [] 86  Resp:  [15-25] 18  SpO2:  [95 %-100 %] 98 %  BP: ()/(52-96) 137/65     Weight: 87 kg (191 lb 12.8 oz)  Body mass index is 38.74 kg/m².    Physical Exam  Constitutional:       General: She is not in acute distress.     Appearance: She is well-developed. She is not diaphoretic.   HENT:      Head: Normocephalic and atraumatic.   Eyes:      Pupils: Pupils are equal, round, and reactive to light.   Cardiovascular:      Rate and Rhythm: Normal rate and regular rhythm.      Heart sounds: Normal heart sounds. No murmur heard.    No friction rub. No gallop.   Pulmonary:      Effort: Pulmonary effort is normal. No respiratory distress.      Breath sounds: Normal breath sounds. No stridor. No wheezing or rales.   Abdominal:      General: Bowel sounds are normal. There is no distension.      Palpations: Abdomen is soft. There is no mass.      Tenderness: There is no abdominal tenderness. There is no guarding.   Genitourinary:     Comments: Right percutaneous  nephrostomy tube  Musculoskeletal:      Right lower leg: No edema.      Left lower leg: No edema.   Skin:     General: Skin is warm.      Findings: No erythema.   Neurological:      Mental Status: She is alert and oriented to person, place, and time.       Significant Labs: All pertinent labs within the past 24 hours have been reviewed.    Significant Imaging: I have reviewed all pertinent imaging results/findings within the past 24 hours.      Assessment/Plan:     * Staghorn calculus  S/p right PCN tube placement  Pain controlled  Empiric merrem by urology       Insomnia  Trazodone qhs       Chronic UTI  Hx of esbl uti   Will repeat u/a and culture         VTE Risk Mitigation (From admission, onward)    None              Thank you for your consult. I will follow-up with patient. Please contact us if you have any additional questions.    Franky Chavez MD  Department of Hospital Medicine   [facility] Tele[facility] ([facility])

## 2023-04-04 NOTE — PROGRESS NOTES
[facility] Follow up 2023    Subjective:     The patient is currently admitted POD#1 s/p R PCNL of solitary kidney. CT ordered for this am and currently pending. There was at least 1 remaining stone that could not be accessed through lower pole access site.   Today, the patient reports flank pain. She also had nausea after receiving morphine. Otherwise, tolerated diet well. No abdominal pain.       Current Facility-Administered Medications   Medication Dose Route Frequency Provider Last Rate Last Admin    0.9%  NaCl infusion   Intravenous Continuous Merle Reed MD 75 mL/hr at 23 0414 New Bag at 23 0414    acetaminophen tablet 650 mg  650 mg Oral Q6H PRN Kaylin Stock, NP   650 mg at 23 2308    HYDROcodone-acetaminophen  mg per tablet 1 tablet  1 tablet Oral Q4H PRN Merle Reed MD        HYDROcodone-acetaminophen 5-325 mg per tablet 1 tablet  1 tablet Oral Q4H PRN Merle Reed MD        meropenem-0.9% sodium chloride 500 mg/50 mL IVPB  500 mg Intravenous Q6H Merle Reed MD   Stopped at 23 0514    morphine injection 3 mg  3 mg Intravenous Q3H PRN Merle Reed MD   3 mg at 23 0702    ondansetron injection 4 mg  4 mg Intravenous Q8H PRN Mrele Reed MD   4 mg at 23 0703    sodium chloride 0.9% flush 3 mL  3 mL Intravenous PRN Merle Reed MD        traZODone tablet 50 mg  50 mg Oral QHS Merle Reed MD           Objective:     Vitals:    23 0718   BP: (!) 122/59   Pulse: 71   Resp:    Temp:      I/O last 3 completed shifts:  In: 1000 [IV Piggyback:1000]  Out: 795 [Urine:795]  Temp (24hrs), Av °F (36.7 °C), Min:97.6 °F (36.4 °C), Max:98.4 °F (36.9 °C)      General: Well-developed, well-nourished in No acute distress  HEENT: Normocephalic, atraumatic  Respirations: Even and unlabored  Abdomen: soft, Non-tender, non-distended, no palpable masses, no rebound or guarding  Flank: Dressing in place over right flank  with some tenderness. PCN tube draining clear red urine  : calderon catheter draining light pink urine  Extremities: Moves all equally, no clubbing, cyanosis or edema  Skin: warm and dry, no lesions or rash  Neuro: cooperative, Cranial nerves II-XII grossly intact  Psych: normal affect    Labs    Recent Results (from the past 336 hour(s))   Basic metabolic panel    Collection Time: 04/04/23  5:40 AM   Result Value Ref Range    Sodium 137 136 - 145 mmol/L    Potassium 4.6 3.5 - 5.1 mmol/L    Chloride 106 95 - 110 mmol/L    CO2 21 (L) 23 - 29 mmol/L    BUN 14 6 - 20 mg/dL    Creatinine 1.0 0.5 - 1.4 mg/dL    Calcium 8.9 8.7 - 10.5 mg/dL    Anion Gap 10 8 - 16 mmol/L   Basic Metabolic Panel    Collection Time: 03/30/23  8:58 AM   Result Value Ref Range    Sodium 139 136 - 145 mmol/L    Potassium 5.0 3.5 - 5.1 mmol/L    Chloride 108 95 - 110 mmol/L    CO2 22 (L) 23 - 29 mmol/L    BUN 14 6 - 20 mg/dL    Creatinine 0.9 0.5 - 1.4 mg/dL    Calcium 9.3 8.7 - 10.5 mg/dL    Anion Gap 9 8 - 16 mmol/L     Recent Results (from the past 336 hour(s))   CBC with Auto Differential    Collection Time: 04/04/23  5:40 AM   Result Value Ref Range    WBC 17.61 (H) 3.90 - 12.70 K/uL    Hemoglobin 10.6 (L) 12.0 - 16.0 g/dL    Hematocrit 34.2 (L) 37.0 - 48.5 %    Platelets 322 150 - 450 K/uL   CBC Auto Differential    Collection Time: 03/30/23  8:58 AM   Result Value Ref Range    WBC 7.13 3.90 - 12.70 K/uL    Hemoglobin 11.5 (L) 12.0 - 16.0 g/dL    Hematocrit 38.0 37.0 - 48.5 %    Platelets 363 150 - 450 K/uL     Microbiology Results (last 7 days)       Procedure Component Value Units Date/Time    Tissue culture [[id number]] Collected: 04/03/23 3228    Order Status: Completed Specimen: Tissue from Kidney, Right Updated: 04/04/23 0524     Gram Stain Result Rare Gram negative rods    Narrative:      Kidney Stone Culture                Assessment    Large right renal stone burden POD#1 s/p R PCNL  Solitary kidney  Recurrent ESBL E. Coli  UTI  Neurogenic bladder s/p Bladder augmentation during childhood    Plan    Continue Merrem  CT scan this am  Gentle hydration  Planning relook procedure on Thursday if urine clears appropriately  Monitor leukocytosis  Encouraged use of SCDs/Ambulation  Incentive spirometry

## 2023-04-04 NOTE — HPI
Patient is a 38 y/o  female with a PMH of staghorn calculus, ESBL UTI, neurogenic bladder, spina bifida, insomnia who is s/p right nephrostomy tube placement along with right percutaneous lithotripsy consulted for medical management. Patient tolerated procedure well. Pain is controlled however she does not like the morphine as it makes her nauseous. Patient is currently on empiric merrem ordered by primary team.

## 2023-04-04 NOTE — PLAN OF CARE
[facility] - Telemetry (Hospital)  Initial Discharge Assessment       Primary Care Provider: Madie Horn MD    Admission Diagnosis: Staghorn calculus [N20.0]    Admission Date: 4/3/2023  Expected Discharge Date:     Discharge Barriers Identified: None    Payor: MEDICAID / Plan: [place] / Product Type: Managed Medicaid /     Extended Emergency Contact Information  Primary Emergency Contact: Lana Delvalle  Mobile Phone: [phone]  Relation: Mother    Discharge Plan A: Home, Home with family  Discharge Plan B: Home, Home with family      [facility] - [facility] [address]  [address] [address]  Phone: [phone] Fax: [phone]      Initial Assessment (most recent)       Adult Discharge Assessment - 04/04/23 1519          Discharge Assessment    Confirmed/corrected address, phone number and insurance Yes     Confirmed Demographics Correct on Facesheet     Source of Information health record     Communicated ANGELES with patient/caregiver Date not available/Unable to determine     Reason For Admission Staghorn calculus     Facility Arrived From: Home     Do you expect to return to your current living situation? Yes     Do you have help at home or someone to help you manage your care at home? Yes     Who are your caregiver(s) and their phone number(s)? Pt's Lana narayanan     Readmission within 30 days? No     Patient currently being followed by outpatient case management? No     Do you take prescription medications? Yes     Do you have prescription coverage? Yes     Do you have any problems affording any of your prescribed medications? No     Is the patient taking medications as prescribed? yes     Who is going to help you get home at discharge? Pt's family will coordinate discharge     How do you get to doctors appointments? family or friend will provide     Are you on dialysis? No     Do you take coumadin? No     Discharge Plan A Home;Home with family      Discharge Plan B Home;Home with family     DME Needed Upon Discharge  none     Discharge Plan discussed with: Patient     Discharge Barriers Identified None                   Medical chart review completed d/t isolation precautions. SW unable to get in contact with patient by cell/room phone. VM left for pt's mother to complete assessment.     SW to remain available if discharge needs arise.

## 2023-04-04 NOTE — SUBJECTIVE & OBJECTIVE
Past Medical History:   Diagnosis Date    Spina bifida     Spina bifida        Past Surgical History:   Procedure Laterality Date    BLADDER AUGMENTATION      CSF SHUNT      INSERTION OF SUPRAPUBIC CATHETER  1990       Review of patient's allergies indicates:   Allergen Reactions    Cefaclor     Cephalexin     Meperidine      vomiting    Promethazine     Red dye        No current facility-administered medications on file prior to encounter.     Current Outpatient Medications on File Prior to Encounter   Medication Sig    traZODone (DESYREL) 50 MG tablet Take 1 tablet (50 mg total) by mouth every evening.    ISIBLOOM 0.15-0.03 mg per tablet TAKE 1 TABLET DAILY FOR BIRTH CONTROL    triamcinolone acetonide 0.1% (KENALOG) 0.1 % ointment Apply topically 2 (two) times daily.     Family History       Problem Relation (Age of Onset)    Diabetes Father, Paternal Grandmother    Graves' disease Mother    Hyperlipidemia Mother, Father    Hypertension Mother    Stroke Paternal Grandmother          Tobacco Use    Smoking status: Never    Smokeless tobacco: Never   Substance and Sexual Activity    Alcohol use: Yes     Comment: Occaisonally    Drug use: Never    Sexual activity: Never     Review of Systems   Constitutional:  Negative for fatigue and fever.   HENT:  Negative for sinus pressure.    Eyes:  Negative for visual disturbance.   Respiratory:  Negative for shortness of breath.    Cardiovascular:  Negative for chest pain.   Gastrointestinal:  Negative for nausea and vomiting.   Genitourinary:  Positive for flank pain. Negative for difficulty urinating.   Musculoskeletal:  Negative for back pain.   Skin:  Negative for rash.   Neurological:  Negative for headaches.   Psychiatric/Behavioral:  Negative for confusion.    Objective:     Vital Signs (Most Recent):  Temp: 98 °F (36.7 °C) (04/04/23 1217)  Pulse: 86 (04/04/23 1217)  Resp: 18 (04/04/23 1217)  BP: 137/65 (04/04/23 1217)  SpO2: 98 % (04/04/23 1217) Vital Signs (24h  Range):  Temp:  [97.6 °F (36.4 °C)-98.1 °F (36.7 °C)] 98 °F (36.7 °C)  Pulse:  [] 86  Resp:  [15-25] 18  SpO2:  [95 %-100 %] 98 %  BP: ()/(52-96) 137/65     Weight: 87 kg (191 lb 12.8 oz)  Body mass index is 38.74 kg/m².    Physical Exam  Constitutional:       General: She is not in acute distress.     Appearance: She is well-developed. She is not diaphoretic.   HENT:      Head: Normocephalic and atraumatic.   Eyes:      Pupils: Pupils are equal, round, and reactive to light.   Cardiovascular:      Rate and Rhythm: Normal rate and regular rhythm.      Heart sounds: Normal heart sounds. No murmur heard.    No friction rub. No gallop.   Pulmonary:      Effort: Pulmonary effort is normal. No respiratory distress.      Breath sounds: Normal breath sounds. No stridor. No wheezing or rales.   Abdominal:      General: Bowel sounds are normal. There is no distension.      Palpations: Abdomen is soft. There is no mass.      Tenderness: There is no abdominal tenderness. There is no guarding.   Genitourinary:     Comments: Right percutaneous nephrostomy tube  Musculoskeletal:      Right lower leg: No edema.      Left lower leg: No edema.   Skin:     General: Skin is warm.      Findings: No erythema.   Neurological:      Mental Status: She is alert and oriented to person, place, and time.       Significant Labs: All pertinent labs within the past 24 hours have been reviewed.    Significant Imaging: I have reviewed all pertinent imaging results/findings within the past 24 hours.

## 2023-04-05 LAB
ANION GAP SERPL CALC-SCNC: 8 MMOL/L (ref 8–16)
BASOPHILS # BLD AUTO: 0.04 K/UL (ref 0–0.2)
BASOPHILS NFR BLD: 0.3 % (ref 0–1.9)
BUN SERPL-MCNC: 10 MG/DL (ref 6–20)
CALCIUM SERPL-MCNC: 8.3 MG/DL (ref 8.7–10.5)
CHLORIDE SERPL-SCNC: 106 MMOL/L (ref 95–110)
CO2 SERPL-SCNC: 24 MMOL/L (ref 23–29)
CREAT SERPL-MCNC: 0.8 MG/DL (ref 0.5–1.4)
DIFFERENTIAL METHOD: ABNORMAL
EOSINOPHIL # BLD AUTO: 0.2 K/UL (ref 0–0.5)
EOSINOPHIL NFR BLD: 1.2 % (ref 0–8)
ERYTHROCYTE [DISTWIDTH] IN BLOOD BY AUTOMATED COUNT: 13.5 % (ref 11.5–14.5)
EST. GFR  (NO RACE VARIABLE): >60 ML/MIN/1.73 M^2
GLUCOSE SERPL-MCNC: 157 MG/DL (ref 70–110)
HCT VFR BLD AUTO: 33.4 % (ref 37–48.5)
HGB BLD-MCNC: 10.6 G/DL (ref 12–16)
IMM GRANULOCYTES # BLD AUTO: 0.05 K/UL (ref 0–0.04)
IMM GRANULOCYTES NFR BLD AUTO: 0.4 % (ref 0–0.5)
LYMPHOCYTES # BLD AUTO: 2.2 K/UL (ref 1–4.8)
LYMPHOCYTES NFR BLD: 16.6 % (ref 18–48)
MCH RBC QN AUTO: 27.5 PG (ref 27–31)
MCHC RBC AUTO-ENTMCNC: 31.7 G/DL (ref 32–36)
MCV RBC AUTO: 87 FL (ref 82–98)
MONOCYTES # BLD AUTO: 0.7 K/UL (ref 0.3–1)
MONOCYTES NFR BLD: 5.3 % (ref 4–15)
NEUTROPHILS # BLD AUTO: 10.3 K/UL (ref 1.8–7.7)
NEUTROPHILS NFR BLD: 76.2 % (ref 38–73)
NRBC BLD-RTO: 0 /100 WBC
PLATELET # BLD AUTO: 285 K/UL (ref 150–450)
PMV BLD AUTO: 9.5 FL (ref 9.2–12.9)
POTASSIUM SERPL-SCNC: 4 MMOL/L (ref 3.5–5.1)
RBC # BLD AUTO: 3.85 M/UL (ref 4–5.4)
SODIUM SERPL-SCNC: 138 MMOL/L (ref 136–145)
WBC # BLD AUTO: 13.46 K/UL (ref 3.9–12.7)

## 2023-04-05 PROCEDURE — 21400001 HC TELEMETRY ROOM

## 2023-04-05 PROCEDURE — 96361 HYDRATE IV INFUSION ADD-ON: CPT

## 2023-04-05 PROCEDURE — 96366 THER/PROPH/DIAG IV INF ADDON: CPT

## 2023-04-05 PROCEDURE — 99024 POSTOP FOLLOW-UP VISIT: CPT | Mod: ,,, | Performed by: UROLOGY

## 2023-04-05 PROCEDURE — 99900035 HC TECH TIME PER 15 MIN (STAT)

## 2023-04-05 PROCEDURE — 80048 BASIC METABOLIC PNL TOTAL CA: CPT | Performed by: UROLOGY

## 2023-04-05 PROCEDURE — 25000003 PHARM REV CODE 250: Performed by: FAMILY MEDICINE

## 2023-04-05 PROCEDURE — 63600175 PHARM REV CODE 636 W HCPCS: Performed by: UROLOGY

## 2023-04-05 PROCEDURE — 94799 UNLISTED PULMONARY SVC/PX: CPT | Mod: XB

## 2023-04-05 PROCEDURE — 27000207 HC ISOLATION

## 2023-04-05 PROCEDURE — 94761 N-INVAS EAR/PLS OXIMETRY MLT: CPT

## 2023-04-05 PROCEDURE — 85025 COMPLETE CBC W/AUTO DIFF WBC: CPT | Performed by: UROLOGY

## 2023-04-05 PROCEDURE — G0378 HOSPITAL OBSERVATION PER HR: HCPCS

## 2023-04-05 PROCEDURE — 99024 PR POST-OP FOLLOW-UP VISIT: ICD-10-PCS | Mod: ,,, | Performed by: UROLOGY

## 2023-04-05 PROCEDURE — 25000003 PHARM REV CODE 250: Performed by: UROLOGY

## 2023-04-05 RX ORDER — OXYCODONE AND ACETAMINOPHEN 10; 325 MG/1; MG/1
1 TABLET ORAL EVERY 4 HOURS PRN
Status: DISCONTINUED | OUTPATIENT
Start: 2023-04-05 | End: 2023-04-07 | Stop reason: HOSPADM

## 2023-04-05 RX ORDER — OXYCODONE AND ACETAMINOPHEN 7.5; 325 MG/1; MG/1
1 TABLET ORAL EVERY 4 HOURS PRN
Status: DISCONTINUED | OUTPATIENT
Start: 2023-04-05 | End: 2023-04-07 | Stop reason: HOSPADM

## 2023-04-05 RX ADMIN — MEROPENEM AND SODIUM CHLORIDE 500 MG: 500 INJECTION, SOLUTION INTRAVENOUS at 08:04

## 2023-04-05 RX ADMIN — SODIUM CHLORIDE: 9 INJECTION, SOLUTION INTRAVENOUS at 04:04

## 2023-04-05 RX ADMIN — MEROPENEM AND SODIUM CHLORIDE 500 MG: 500 INJECTION, SOLUTION INTRAVENOUS at 03:04

## 2023-04-05 RX ADMIN — OXYCODONE AND ACETAMINOPHEN 1 TABLET: 10; 325 TABLET ORAL at 04:04

## 2023-04-05 RX ADMIN — HYDROCODONE BITARTRATE AND ACETAMINOPHEN 1 TABLET: 10; 325 TABLET ORAL at 07:04

## 2023-04-05 RX ADMIN — MEROPENEM AND SODIUM CHLORIDE 500 MG: 500 INJECTION, SOLUTION INTRAVENOUS at 09:04

## 2023-04-05 RX ADMIN — OXYCODONE AND ACETAMINOPHEN 1 TABLET: 10; 325 TABLET ORAL at 11:04

## 2023-04-05 RX ADMIN — HYDROCODONE BITARTRATE AND ACETAMINOPHEN 1 TABLET: 10; 325 TABLET ORAL at 01:04

## 2023-04-05 RX ADMIN — OXYCODONE AND ACETAMINOPHEN 1 TABLET: 10; 325 TABLET ORAL at 08:04

## 2023-04-05 RX ADMIN — TRAZODONE HYDROCHLORIDE 50 MG: 50 TABLET ORAL at 08:04

## 2023-04-05 RX ADMIN — MEROPENEM AND SODIUM CHLORIDE 500 MG: 500 INJECTION, SOLUTION INTRAVENOUS at 04:04

## 2023-04-05 NOTE — SUBJECTIVE & OBJECTIVE
Interval History: Patient only complains of pain, otherwise denies any other issues.     Review of Systems   Constitutional:  Negative for fatigue and fever.   HENT:  Negative for sinus pressure.    Eyes:  Negative for visual disturbance.   Respiratory:  Negative for shortness of breath.    Cardiovascular:  Negative for chest pain.   Gastrointestinal:  Negative for nausea and vomiting.   Genitourinary:  Positive for flank pain. Negative for difficulty urinating.   Musculoskeletal:  Negative for back pain.   Skin:  Negative for rash.   Neurological:  Negative for headaches.   Psychiatric/Behavioral:  Negative for confusion.    Objective:     Vital Signs (Most Recent):  Temp: 97.2 °F (36.2 °C) (04/05/23 1147)  Pulse: 76 (04/05/23 1147)  Resp: 19 (04/05/23 1150)  BP: 132/75 (04/05/23 1147)  SpO2: 97 % (04/05/23 1147) Vital Signs (24h Range):  Temp:  [97.2 °F (36.2 °C)-98.7 °F (37.1 °C)] 97.2 °F (36.2 °C)  Pulse:  [66-97] 76  Resp:  [16-20] 19  SpO2:  [94 %-97 %] 97 %  BP: (118-140)/(59-78) 132/75     Weight: 85.5 kg (188 lb 7.9 oz)  Body mass index is 38.07 kg/m².    Intake/Output Summary (Last 24 hours) at 4/5/2023 1235  Last data filed at 4/4/2023 1734  Gross per 24 hour   Intake 850.71 ml   Output 760 ml   Net 90.71 ml      Physical Exam  Constitutional:       General: She is not in acute distress.     Appearance: She is well-developed. She is not diaphoretic.   HENT:      Head: Normocephalic and atraumatic.   Eyes:      Pupils: Pupils are equal, round, and reactive to light.   Cardiovascular:      Rate and Rhythm: Normal rate and regular rhythm.      Heart sounds: Normal heart sounds. No murmur heard.    No friction rub. No gallop.   Pulmonary:      Effort: Pulmonary effort is normal. No respiratory distress.      Breath sounds: Normal breath sounds. No stridor. No wheezing or rales.   Abdominal:      General: Bowel sounds are normal. There is no distension.      Palpations: Abdomen is soft. There is no mass.       Tenderness: There is no abdominal tenderness. There is no guarding.   Genitourinary:     Comments: Right percutaneous nephrostomy tube  Musculoskeletal:      Right lower leg: No edema.      Left lower leg: No edema.   Skin:     General: Skin is warm.      Findings: No erythema.   Neurological:      Mental Status: She is alert and oriented to person, place, and time.       Significant Labs: All pertinent labs within the past 24 hours have been reviewed.    Significant Imaging: I have reviewed all pertinent imaging results/findings within the past 24 hours.

## 2023-04-05 NOTE — PROGRESS NOTES
[facility] Follow up 2023    Subjective:     The patient is currently admitted POD#2 s/p R PCNL of solitary kidney. CT scan shows 2 large stones remaining. Pt reports pain in right flank and poor appetite. No nausea. No BM yet. Ambulated in the halls.     Current Facility-Administered Medications   Medication Dose Route Frequency Provider Last Rate Last Admin    0.9%  NaCl infusion   Intravenous Continuous Merle Reed MD   Stopped at 23 1658    acetaminophen tablet 650 mg  650 mg Oral Q6H PRN Kaylin Stock NP   650 mg at 23 2308    HYDROcodone-acetaminophen  mg per tablet 1 tablet  1 tablet Oral Q4H PRN Merle Reed MD   1 tablet at 23 0748    HYDROcodone-acetaminophen 5-325 mg per tablet 1 tablet  1 tablet Oral Q4H PRN Merle Reed MD   1 tablet at 23 1243    meropenem-0.9% sodium chloride 500 mg/50 mL IVPB  500 mg Intravenous Q6H Merle Reed MD   Stopped at 23 0439    ondansetron injection 4 mg  4 mg Intravenous Q8H PRN Merle Reed MD   4 mg at 23 0703    sodium chloride 0.9% flush 3 mL  3 mL Intravenous PRN Merle Reed MD        traZODone tablet 50 mg  50 mg Oral QHS Merle Reed MD   50 mg at 23       Objective:     Vitals:    23 0748   BP:    Pulse:    Resp: 20   Temp:      I/O last 3 completed shifts:  In: 1090.7 [P.O.:240; I.V.:756.1; IV Piggyback:94.6]  Out: 1555 [Urine:1555]  Temp (24hrs), Av.2 °F (36.8 °C), Min:97.9 °F (36.6 °C), Max:98.7 °F (37.1 °C)      General: Well-developed, well-nourished in No acute distress  HEENT: Normocephalic, atraumatic  Respirations: Even and unlabored  Abdomen: soft, Non-tender, non-distended, no palpable masses, no rebound or guarding  Flank: Dressing in place over right flank with some tenderness. PCN tube draining clear yellow urine with red sediment  : calderon catheter draining small amounts of yellow urine  Extremities: Moves all equally, no clubbing,  cyanosis or edema  Skin: warm and dry, no lesions or rash  Neuro: cooperative, Cranial nerves II-XII grossly intact  Psych: normal affect    Labs    Recent Results (from the past 336 hour(s))   Basic metabolic panel    Collection Time: 04/04/23  5:40 AM   Result Value Ref Range    Sodium 137 136 - 145 mmol/L    Potassium 4.6 3.5 - 5.1 mmol/L    Chloride 106 95 - 110 mmol/L    CO2 21 (L) 23 - 29 mmol/L    BUN 14 6 - 20 mg/dL    Creatinine 1.0 0.5 - 1.4 mg/dL    Calcium 8.9 8.7 - 10.5 mg/dL    Anion Gap 10 8 - 16 mmol/L   Basic Metabolic Panel    Collection Time: 03/30/23  8:58 AM   Result Value Ref Range    Sodium 139 136 - 145 mmol/L    Potassium 5.0 3.5 - 5.1 mmol/L    Chloride 108 95 - 110 mmol/L    CO2 22 (L) 23 - 29 mmol/L    BUN 14 6 - 20 mg/dL    Creatinine 0.9 0.5 - 1.4 mg/dL    Calcium 9.3 8.7 - 10.5 mg/dL    Anion Gap 9 8 - 16 mmol/L     Recent Results (from the past 336 hour(s))   CBC with Auto Differential    Collection Time: 04/04/23  5:40 AM   Result Value Ref Range    WBC 17.61 (H) 3.90 - 12.70 K/uL    Hemoglobin 10.6 (L) 12.0 - 16.0 g/dL    Hematocrit 34.2 (L) 37.0 - 48.5 %    Platelets 322 150 - 450 K/uL   CBC Auto Differential    Collection Time: 03/30/23  8:58 AM   Result Value Ref Range    WBC 7.13 3.90 - 12.70 K/uL    Hemoglobin 11.5 (L) 12.0 - 16.0 g/dL    Hematocrit 38.0 37.0 - 48.5 %    Platelets 363 150 - 450 K/uL     Microbiology Results (last 7 days)       Procedure Component Value Units Date/Time    Tissue culture [[id number]] Collected: 04/03/23 4594    Order Status: Completed Specimen: Tissue from Kidney, Right Updated: 04/05/23 0729     Aerobic Culture - Tissue No growth     Gram Stain Result Rare Gram negative rods    Narrative:      Kidney Stone Culture                Assessment    Large right renal stone burden POD#1 s/p R PCNL  Solitary kidney  Recurrent ESBL E. Coli UTI  Neurogenic bladder s/p Bladder augmentation during childhood    Plan    Continue Merrem  Labs currently  pending  Gentle hydration  Planning relook procedure tomorrow  Monitor leukocytosis  Encouraged use of SCDs/Ambulation  Incentive spirometry  Appreciate hospital medicine's assistance.

## 2023-04-05 NOTE — PROGRESS NOTES
[facility] Medicine  Progress Note    Patient Name: Addis Delvalle  MRN: [id number]  Patient Class: IP- Inpatient   Admission Date: 4/3/2023  Length of Stay: 2 days  Attending Physician: Merle Reed MD  Primary Care Provider: Madie Horn MD        Subjective:     Principal Problem:Staghorn calculus        HPI:  Patient is a 36 y/o  female with a PMH of staghorn calculus, ESBL UTI, neurogenic bladder, spina bifida, insomnia who is s/p right nephrostomy tube placement along with right percutaneous lithotripsy consulted for medical management. Patient tolerated procedure well. Pain is controlled however she does not like the morphine as it makes her nauseous. Patient is currently on empiric merrem ordered by primary team.       Overview/Hospital Course:  4/5: pt reports pain from percutaneous nephrostomy site. Pain meds changed to percocet. Cont merrem per urology. Relook procedure tomorrow. Will make npo at midnight.       Interval History: Patient only complains of pain, otherwise denies any other issues.     Review of Systems   Constitutional:  Negative for fatigue and fever.   HENT:  Negative for sinus pressure.    Eyes:  Negative for visual disturbance.   Respiratory:  Negative for shortness of breath.    Cardiovascular:  Negative for chest pain.   Gastrointestinal:  Negative for nausea and vomiting.   Genitourinary:  Positive for flank pain. Negative for difficulty urinating.   Musculoskeletal:  Negative for back pain.   Skin:  Negative for rash.   Neurological:  Negative for headaches.   Psychiatric/Behavioral:  Negative for confusion.    Objective:     Vital Signs (Most Recent):  Temp: 97.2 °F (36.2 °C) (04/05/23 1147)  Pulse: 76 (04/05/23 1147)  Resp: 19 (04/05/23 1150)  BP: 132/75 (04/05/23 1147)  SpO2: 97 % (04/05/23 1147) Vital Signs (24h Range):  Temp:  [97.2 °F (36.2 °C)-98.7 °F (37.1 °C)] 97.2 °F (36.2 °C)  Pulse:  [66-97] 76  Resp:  [16-20] 19  SpO2:  [94  %-97 %] 97 %  BP: (118-140)/(59-78) 132/75     Weight: 85.5 kg (188 lb 7.9 oz)  Body mass index is 38.07 kg/m².    Intake/Output Summary (Last 24 hours) at 4/5/2023 1235  Last data filed at 4/4/2023 1734  Gross per 24 hour   Intake 850.71 ml   Output 760 ml   Net 90.71 ml      Physical Exam  Constitutional:       General: She is not in acute distress.     Appearance: She is well-developed. She is not diaphoretic.   HENT:      Head: Normocephalic and atraumatic.   Eyes:      Pupils: Pupils are equal, round, and reactive to light.   Cardiovascular:      Rate and Rhythm: Normal rate and regular rhythm.      Heart sounds: Normal heart sounds. No murmur heard.    No friction rub. No gallop.   Pulmonary:      Effort: Pulmonary effort is normal. No respiratory distress.      Breath sounds: Normal breath sounds. No stridor. No wheezing or rales.   Abdominal:      General: Bowel sounds are normal. There is no distension.      Palpations: Abdomen is soft. There is no mass.      Tenderness: There is no abdominal tenderness. There is no guarding.   Genitourinary:     Comments: Right percutaneous nephrostomy tube  Musculoskeletal:      Right lower leg: No edema.      Left lower leg: No edema.   Skin:     General: Skin is warm.      Findings: No erythema.   Neurological:      Mental Status: She is alert and oriented to person, place, and time.       Significant Labs: All pertinent labs within the past 24 hours have been reviewed.    Significant Imaging: I have reviewed all pertinent imaging results/findings within the past 24 hours.          Assessment/Plan:      * Staghorn calculus  S/p right PCN tube placement  Pain uncontrolled  Will switch to percocet   Empiric merrem by urology   Urology planning for relook procedure tomorrow  NPO at midnight    Insomnia  Trazodone qhs       Chronic UTI  Hx of esbl uti   Will repeat u/a and culture       VTE Risk Mitigation (From admission, onward)         Ordered     Place sequential  compression device  Until discontinued         04/05/23 1237                Discharge Planning   ANGELES:      Code Status: Full Code   Is the patient medically ready for discharge?:     Reason for patient still in hospital (select all that apply): Patient trending condition  Discharge Plan A: Home, Home with family                  Franky Chavez MD  Department of Hospital Medicine   [facility] ([facility])

## 2023-04-05 NOTE — HOSPITAL COURSE
4/5: pt reports pain from percutaneous nephrostomy site. Pain meds changed to percocet. Cont merrem per urology. Relook procedure tomorrow. Will make npo at midnight.   4/6: relook procedure today. Tolerated procedure well. Cont merrem per urology.

## 2023-04-05 NOTE — ASSESSMENT & PLAN NOTE
S/p right PCN tube placement  Pain uncontrolled  Will switch to percocet   Empiric merrem by urology   Urology planning for relook procedure tomorrow  NPO at midnight

## 2023-04-06 ENCOUNTER — ANESTHESIA EVENT (OUTPATIENT)
Dept: SURGERY | Facility: HOSPITAL | Age: 38
End: 2023-04-06
Payer: COMMERCIAL

## 2023-04-06 ENCOUNTER — ANESTHESIA (OUTPATIENT)
Dept: SURGERY | Facility: HOSPITAL | Age: 38
End: 2023-04-06
Payer: COMMERCIAL

## 2023-04-06 LAB
ANION GAP SERPL CALC-SCNC: 7 MMOL/L (ref 8–16)
BUN SERPL-MCNC: 8 MG/DL (ref 6–20)
CALCIUM SERPL-MCNC: 8.6 MG/DL (ref 8.7–10.5)
CHLORIDE SERPL-SCNC: 107 MMOL/L (ref 95–110)
CO2 SERPL-SCNC: 25 MMOL/L (ref 23–29)
CREAT SERPL-MCNC: 0.7 MG/DL (ref 0.5–1.4)
ERYTHROCYTE [DISTWIDTH] IN BLOOD BY AUTOMATED COUNT: 13.3 % (ref 11.5–14.5)
EST. GFR  (NO RACE VARIABLE): >60 ML/MIN/1.73 M^2
GLUCOSE SERPL-MCNC: 109 MG/DL (ref 70–110)
HCT VFR BLD AUTO: 35.4 % (ref 37–48.5)
HGB BLD-MCNC: 11.1 G/DL (ref 12–16)
MCH RBC QN AUTO: 27.2 PG (ref 27–31)
MCHC RBC AUTO-ENTMCNC: 31.4 G/DL (ref 32–36)
MCV RBC AUTO: 87 FL (ref 82–98)
PLATELET # BLD AUTO: 289 K/UL (ref 150–450)
PMV BLD AUTO: 9.5 FL (ref 9.2–12.9)
POTASSIUM SERPL-SCNC: 4.6 MMOL/L (ref 3.5–5.1)
RBC # BLD AUTO: 4.08 M/UL (ref 4–5.4)
SODIUM SERPL-SCNC: 139 MMOL/L (ref 136–145)
WBC # BLD AUTO: 10.2 K/UL (ref 3.9–12.7)

## 2023-04-06 PROCEDURE — 63600175 PHARM REV CODE 636 W HCPCS: Performed by: UROLOGY

## 2023-04-06 PROCEDURE — G0378 HOSPITAL OBSERVATION PER HR: HCPCS

## 2023-04-06 PROCEDURE — 88300 PR  SURG PATH,GROSS,LEVEL I: ICD-10-PCS | Mod: 26,,, | Performed by: PATHOLOGY

## 2023-04-06 PROCEDURE — 99024 POSTOP FOLLOW-UP VISIT: CPT | Mod: ,,, | Performed by: UROLOGY

## 2023-04-06 PROCEDURE — 36000706: Performed by: UROLOGY

## 2023-04-06 PROCEDURE — 00862 ANES XTRPRTL LWR ABD RNL PX: CPT | Performed by: UROLOGY

## 2023-04-06 PROCEDURE — 63600175 PHARM REV CODE 636 W HCPCS: Performed by: NURSE ANESTHETIST, CERTIFIED REGISTERED

## 2023-04-06 PROCEDURE — 50080 PR PERCUT REMV KID STONE,UP TO 2 CM: ICD-10-PCS | Mod: 58,RT,, | Performed by: UROLOGY

## 2023-04-06 PROCEDURE — 37000009 HC ANESTHESIA EA ADD 15 MINS: Performed by: UROLOGY

## 2023-04-06 PROCEDURE — 37000008 HC ANESTHESIA 1ST 15 MINUTES: Performed by: UROLOGY

## 2023-04-06 PROCEDURE — 25000003 PHARM REV CODE 250: Performed by: STUDENT IN AN ORGANIZED HEALTH CARE EDUCATION/TRAINING PROGRAM

## 2023-04-06 PROCEDURE — 21400001 HC TELEMETRY ROOM

## 2023-04-06 PROCEDURE — 36000707: Performed by: UROLOGY

## 2023-04-06 PROCEDURE — C1769 GUIDE WIRE: HCPCS | Performed by: UROLOGY

## 2023-04-06 PROCEDURE — 96366 THER/PROPH/DIAG IV INF ADDON: CPT | Mod: 59

## 2023-04-06 PROCEDURE — C1729 CATH, DRAINAGE: HCPCS | Performed by: UROLOGY

## 2023-04-06 PROCEDURE — 27000207 HC ISOLATION

## 2023-04-06 PROCEDURE — C1758 CATHETER, URETERAL: HCPCS | Performed by: UROLOGY

## 2023-04-06 PROCEDURE — 36415 COLL VENOUS BLD VENIPUNCTURE: CPT | Performed by: UROLOGY

## 2023-04-06 PROCEDURE — 99900035 HC TECH TIME PER 15 MIN (STAT)

## 2023-04-06 PROCEDURE — 96361 HYDRATE IV INFUSION ADD-ON: CPT

## 2023-04-06 PROCEDURE — 82365 CALCULUS SPECTROSCOPY: CPT | Performed by: UROLOGY

## 2023-04-06 PROCEDURE — 63600175 PHARM REV CODE 636 W HCPCS: Performed by: STUDENT IN AN ORGANIZED HEALTH CARE EDUCATION/TRAINING PROGRAM

## 2023-04-06 PROCEDURE — 88300 SURGICAL PATH GROSS: CPT | Performed by: PATHOLOGY

## 2023-04-06 PROCEDURE — 94799 UNLISTED PULMONARY SVC/PX: CPT

## 2023-04-06 PROCEDURE — 71000033 HC RECOVERY, INTIAL HOUR: Performed by: UROLOGY

## 2023-04-06 PROCEDURE — 27201423 OPTIME MED/SURG SUP & DEVICES STERILE SUPPLY: Performed by: UROLOGY

## 2023-04-06 PROCEDURE — 25000003 PHARM REV CODE 250: Performed by: NURSE ANESTHETIST, CERTIFIED REGISTERED

## 2023-04-06 PROCEDURE — 94761 N-INVAS EAR/PLS OXIMETRY MLT: CPT

## 2023-04-06 PROCEDURE — 80048 BASIC METABOLIC PNL TOTAL CA: CPT | Performed by: UROLOGY

## 2023-04-06 PROCEDURE — 99024 PR POST-OP FOLLOW-UP VISIT: ICD-10-PCS | Mod: ,,, | Performed by: UROLOGY

## 2023-04-06 PROCEDURE — 25000003 PHARM REV CODE 250: Performed by: UROLOGY

## 2023-04-06 PROCEDURE — 96376 TX/PRO/DX INJ SAME DRUG ADON: CPT | Mod: 59

## 2023-04-06 PROCEDURE — 25500020 PHARM REV CODE 255: Performed by: UROLOGY

## 2023-04-06 PROCEDURE — 50080 PERQ NL/PL LITHOTRP SMPL<2CM: CPT | Mod: 58,RT,, | Performed by: UROLOGY

## 2023-04-06 PROCEDURE — 85027 COMPLETE CBC AUTOMATED: CPT | Performed by: UROLOGY

## 2023-04-06 PROCEDURE — 25000003 PHARM REV CODE 250: Performed by: FAMILY MEDICINE

## 2023-04-06 PROCEDURE — 88300 SURGICAL PATH GROSS: CPT | Mod: 26,,, | Performed by: PATHOLOGY

## 2023-04-06 RX ORDER — ROCURONIUM BROMIDE 10 MG/ML
INJECTION, SOLUTION INTRAVENOUS
Status: DISCONTINUED | OUTPATIENT
Start: 2023-04-06 | End: 2023-04-06

## 2023-04-06 RX ORDER — ACETAMINOPHEN 325 MG/1
650 TABLET ORAL EVERY 6 HOURS PRN
Status: DISCONTINUED | OUTPATIENT
Start: 2023-04-06 | End: 2023-04-07 | Stop reason: HOSPADM

## 2023-04-06 RX ORDER — LIDOCAINE HYDROCHLORIDE 10 MG/ML
INJECTION, SOLUTION EPIDURAL; INFILTRATION; INTRACAUDAL; PERINEURAL
Status: DISCONTINUED | OUTPATIENT
Start: 2023-04-06 | End: 2023-04-06

## 2023-04-06 RX ORDER — ONDANSETRON 2 MG/ML
INJECTION INTRAMUSCULAR; INTRAVENOUS
Status: DISCONTINUED | OUTPATIENT
Start: 2023-04-06 | End: 2023-04-06

## 2023-04-06 RX ORDER — SCOLOPAMINE TRANSDERMAL SYSTEM 1 MG/1
1 PATCH, EXTENDED RELEASE TRANSDERMAL ONCE
Status: COMPLETED | OUTPATIENT
Start: 2023-04-06 | End: 2023-04-06

## 2023-04-06 RX ORDER — NEOSTIGMINE METHYLSULFATE 1 MG/ML
INJECTION, SOLUTION INTRAVENOUS
Status: DISCONTINUED | OUTPATIENT
Start: 2023-04-06 | End: 2023-04-06

## 2023-04-06 RX ORDER — HYDROMORPHONE HYDROCHLORIDE 1 MG/ML
0.5 INJECTION, SOLUTION INTRAMUSCULAR; INTRAVENOUS; SUBCUTANEOUS EVERY 4 HOURS PRN
Status: CANCELLED | OUTPATIENT
Start: 2023-04-06

## 2023-04-06 RX ORDER — SODIUM CHLORIDE, SODIUM LACTATE, POTASSIUM CHLORIDE, CALCIUM CHLORIDE 600; 310; 30; 20 MG/100ML; MG/100ML; MG/100ML; MG/100ML
INJECTION, SOLUTION INTRAVENOUS CONTINUOUS PRN
Status: DISCONTINUED | OUTPATIENT
Start: 2023-04-06 | End: 2023-04-06

## 2023-04-06 RX ORDER — FENTANYL CITRATE 50 UG/ML
INJECTION, SOLUTION INTRAMUSCULAR; INTRAVENOUS
Status: DISCONTINUED | OUTPATIENT
Start: 2023-04-06 | End: 2023-04-06

## 2023-04-06 RX ORDER — MIDAZOLAM HYDROCHLORIDE 1 MG/ML
INJECTION INTRAMUSCULAR; INTRAVENOUS
Status: DISCONTINUED | OUTPATIENT
Start: 2023-04-06 | End: 2023-04-06

## 2023-04-06 RX ORDER — HYDROMORPHONE HYDROCHLORIDE 2 MG/ML
0.2 INJECTION, SOLUTION INTRAMUSCULAR; INTRAVENOUS; SUBCUTANEOUS EVERY 5 MIN PRN
Status: DISCONTINUED | OUTPATIENT
Start: 2023-04-06 | End: 2023-04-07 | Stop reason: HOSPADM

## 2023-04-06 RX ORDER — PROPOFOL 10 MG/ML
VIAL (ML) INTRAVENOUS
Status: DISCONTINUED | OUTPATIENT
Start: 2023-04-06 | End: 2023-04-06

## 2023-04-06 RX ORDER — DEXAMETHASONE SODIUM PHOSPHATE 4 MG/ML
INJECTION, SOLUTION INTRA-ARTICULAR; INTRALESIONAL; INTRAMUSCULAR; INTRAVENOUS; SOFT TISSUE
Status: DISCONTINUED | OUTPATIENT
Start: 2023-04-06 | End: 2023-04-06

## 2023-04-06 RX ADMIN — OXYCODONE AND ACETAMINOPHEN 1 TABLET: 10; 325 TABLET ORAL at 11:04

## 2023-04-06 RX ADMIN — FENTANYL CITRATE 50 MCG: 50 INJECTION, SOLUTION INTRAMUSCULAR; INTRAVENOUS at 10:04

## 2023-04-06 RX ADMIN — MEROPENEM AND SODIUM CHLORIDE 500 MG: 500 INJECTION, SOLUTION INTRAVENOUS at 10:04

## 2023-04-06 RX ADMIN — HYDROMORPHONE HYDROCHLORIDE 0.2 MG: 2 INJECTION INTRAMUSCULAR; INTRAVENOUS; SUBCUTANEOUS at 01:04

## 2023-04-06 RX ADMIN — ONDANSETRON 4 MG: 2 INJECTION INTRAMUSCULAR; INTRAVENOUS at 07:04

## 2023-04-06 RX ADMIN — GLYCOPYRROLATE 0.4 MG: 0.2 INJECTION, SOLUTION INTRAMUSCULAR; INTRAVENOUS at 12:04

## 2023-04-06 RX ADMIN — SCOPOLAMINE 1 PATCH: 1 SYSTEM TRANSDERMAL at 10:04

## 2023-04-06 RX ADMIN — LIDOCAINE HYDROCHLORIDE 100 MG: 10 INJECTION, SOLUTION EPIDURAL; INFILTRATION; INTRACAUDAL; PERINEURAL at 10:04

## 2023-04-06 RX ADMIN — MIDAZOLAM HYDROCHLORIDE 2 MG: 1 INJECTION, SOLUTION INTRAMUSCULAR; INTRAVENOUS at 10:04

## 2023-04-06 RX ADMIN — ROCURONIUM BROMIDE 50 MG: 10 SOLUTION INTRAVENOUS at 10:04

## 2023-04-06 RX ADMIN — TRAZODONE HYDROCHLORIDE 50 MG: 50 TABLET ORAL at 09:04

## 2023-04-06 RX ADMIN — NEOSTIGMINE METHYLSULFATE 2 MG: 1 INJECTION INTRAVENOUS at 12:04

## 2023-04-06 RX ADMIN — OXYCODONE AND ACETAMINOPHEN 1 TABLET: 10; 325 TABLET ORAL at 06:04

## 2023-04-06 RX ADMIN — DEXAMETHASONE SODIUM PHOSPHATE 8 MG: 4 INJECTION, SOLUTION INTRA-ARTICULAR; INTRALESIONAL; INTRAMUSCULAR; INTRAVENOUS; SOFT TISSUE at 10:04

## 2023-04-06 RX ADMIN — MEROPENEM AND SODIUM CHLORIDE 500 MG: 500 INJECTION, SOLUTION INTRAVENOUS at 09:04

## 2023-04-06 RX ADMIN — SODIUM CHLORIDE: 9 INJECTION, SOLUTION INTRAVENOUS at 09:04

## 2023-04-06 RX ADMIN — OXYCODONE AND ACETAMINOPHEN 1 TABLET: 10; 325 TABLET ORAL at 01:04

## 2023-04-06 RX ADMIN — ONDANSETRON 4 MG: 2 INJECTION INTRAMUSCULAR; INTRAVENOUS at 11:04

## 2023-04-06 RX ADMIN — SUGAMMADEX 200 MG: 100 INJECTION, SOLUTION INTRAVENOUS at 12:04

## 2023-04-06 RX ADMIN — OXYCODONE AND ACETAMINOPHEN 1 TABLET: 10; 325 TABLET ORAL at 07:04

## 2023-04-06 RX ADMIN — MEROPENEM AND SODIUM CHLORIDE 500 MG: 500 INJECTION, SOLUTION INTRAVENOUS at 02:04

## 2023-04-06 RX ADMIN — MEROPENEM AND SODIUM CHLORIDE 500 MG: 500 INJECTION, SOLUTION INTRAVENOUS at 03:04

## 2023-04-06 RX ADMIN — SODIUM CHLORIDE, SODIUM LACTATE, POTASSIUM CHLORIDE, AND CALCIUM CHLORIDE: 600; 310; 30; 20 INJECTION, SOLUTION INTRAVENOUS at 10:04

## 2023-04-06 RX ADMIN — OXYCODONE AND ACETAMINOPHEN 1 TABLET: 10; 325 TABLET ORAL at 03:04

## 2023-04-06 RX ADMIN — PROPOFOL 150 MG: 10 INJECTION, EMULSION INTRAVENOUS at 10:04

## 2023-04-06 NOTE — OP NOTE
[facility]  Operative Note    Date: 04/06/2023    Pre-Op Diagnosis:   Right renal stone  Solitary kidney  Persistent UTI  Neurogenic bladder    Post-Op Diagnosis: same    Procedure(s) Performed:   1.  Right relook PCNL ([id number])  2.  Right nephrostomy tube placement  3.  Right antegrade nephrostogram  4.  Fluoro < 1 h    Specimen(s): Right renal stone    Staff Surgeon: Merle Reed MD    Assistant Surgeon: none    Anesthesia: General endotracheal anesthesia    Indications: Addis Delvalle is a 37 y.o. female with right renal stones presenting for definitive management.  She underwent Initial PCNL on 4/3/23. CT scan done post-op showed remaining stone fragments. She presents to the OR for relook PCNL procedure    Findings:  1.  Complete visual clearance of all stones. However, there still could be a remaining stone that could not be reached in a mid-pole calyx. Ureteroscopy was clear of stones, as were all visualized calyces. There was a small amount of contrast extravasation at the lower pole, which was noted at the end of the procedure. The upper pole could not be completely visualized at the final nephrostogram, and this was felt to be due to distention.     Estimated Blood Loss: 20cc    Drains:   1.  10 Fr nephrostomy tube  2.  14 Fr urethral calderon catheter    Procedure in detail:  After the risks, benefits and possible complications of the procedure were explained, the patient elected to undergo the above procedures and informed consent was obtained. The patient was taken to the OR and placed under anesthesia. Scheduled antibiotics were administered. Time out was performed. SCDs were applied and working prior to the procedure.      The patient was then placed in prone position upon the OR table and prepped and draped in the usual sterile fashion, which included the previously placed safety wire and nephrostomy tube. Nephrostogram was performed, noting no initial contrast  extravasation. A motion wire was advanced through the indwelling pollock catheter, and the nephrostomy tube and pollock were backloaded.   Fluoroscopy was used to confirm correct wire placement in the bladder. The access sheath was removed. A 30 fr dilator balloon was advanced over the wire and the tip of the balloon was placed into the renal pelvis, confirmed with fluoroscopy. The balloon was dilated and the sheath was advanced over the balloon. The balloon was deflated and removed, leaving both wires in place.     The nephroscope, which had been previously assembled, was advanced into the sheath. The UPJ was identified. There was a large anterior calyceal stone identified. This was broken up using the lithoclast device.     Flexible pyeloscopy was then performed to evaluate all the calyces. Stone fragments that were visualized were removed with a basket. There were no significant stone fragments identified at the end of flexible pyeloscopy.  The flexible ureteroscope was utilized to evaluate the entire ureter.     A 10 Fr pigtail nephrostomy tube was advanced over the motion wire with curl in the renal pelvis.  Antegrade nephrostogram was performed showing mild lower pole extravasation of contrast and no filling defects. The upper pole could not be clearly visualized. The pigtail was tightened.     The sheath was removed,  leaving the nephrotomy tube in place.  The skin was closed with 3-0 monocryl in a vertical mattress fashion.  The nephrostomy tube was secured to the skin using a 2-0 prolene.     A sterile compressive dressing was placed. The patient was then transferred back to PACU in stable condition.    Disposition:  The patient will be admitted to the floor for postoperative monitoring.      Merle Reed MD

## 2023-04-06 NOTE — PROGRESS NOTES
[facility] Follow up 2023    Subjective:     The patient is currently admitted POD#3 s/p R PCNL of solitary kidney. Pt reports pain in right flank and poor appetite. Oral pain meds not helping adequately. No nausea.  Ambulated in the halls again yesterday.     Current Facility-Administered Medications   Medication Dose Route Frequency Provider Last Rate Last Admin    0.9%  NaCl infusion   Intravenous Continuous Merle Reed MD 75 mL/hr at 23 0927 New Bag at 23 09    acetaminophen tablet 650 mg  650 mg Oral Q6H PRN Kaylin Stock NP   650 mg at 23 2308    meropenem-0.9% sodium chloride 500 mg/50 mL IVPB  500 mg Intravenous Q6H Merle Reed MD 50 mL/hr at 23 0928 500 mg at 23    ondansetron injection 4 mg  4 mg Intravenous Q8H PRN Merle Reed MD   4 mg at 23 0703    oxyCODONE-acetaminophen  mg per tablet 1 tablet  1 tablet Oral Q4H PRN Franky Chavez MD   1 tablet at 23 0610    oxyCODONE-acetaminophen 7.5-325 mg per tablet 1 tablet  1 tablet Oral Q4H PRN Franky Chavez MD        sodium chloride 0.9% flush 3 mL  3 mL Intravenous PRN Merle Reed MD        traZODone tablet 50 mg  50 mg Oral QHS Merle Reed MD   50 mg at 23       Objective:     Vitals:    23 0736   BP: 134/72   Pulse: 76   Resp: 18   Temp: 98.5 °F (36.9 °C)     I/O last 3 completed shifts:  In: 973.9 [P.O.:440; I.V.:329.6; IV Piggyback:204.3]  Out: 1900 [Urine:1900]  Temp (24hrs), Av.2 °F (36.8 °C), Min:97.2 °F (36.2 °C), Max:98.8 °F (37.1 °C)      General: Well-developed, well-nourished in No acute distress  HEENT: Normocephalic, atraumatic  Respirations: Even and unlabored  Abdomen: soft, Non-tender, non-distended, no palpable masses, no rebound or guarding  Flank: Dressing in place over right flank with some tenderness. PCN tube draining clear yellow urine with red sediment  : calderon catheter draining small amounts of yellow urine  Extremities:  Moves all equally, no clubbing, cyanosis or edema  Skin: warm and dry, no lesions or rash  Neuro: cooperative, Cranial nerves II-XII grossly intact  Psych: normal affect    Labs    Recent Results (from the past 336 hour(s))   Basic metabolic panel    Collection Time: 04/06/23  7:56 AM   Result Value Ref Range    Sodium 139 136 - 145 mmol/L    Potassium 4.6 3.5 - 5.1 mmol/L    Chloride 107 95 - 110 mmol/L    CO2 25 23 - 29 mmol/L    BUN 8 6 - 20 mg/dL    Creatinine 0.7 0.5 - 1.4 mg/dL    Calcium 8.6 (L) 8.7 - 10.5 mg/dL    Anion Gap 7 (L) 8 - 16 mmol/L   Basic metabolic panel    Collection Time: 04/05/23  9:45 AM   Result Value Ref Range    Sodium 138 136 - 145 mmol/L    Potassium 4.0 3.5 - 5.1 mmol/L    Chloride 106 95 - 110 mmol/L    CO2 24 23 - 29 mmol/L    BUN 10 6 - 20 mg/dL    Creatinine 0.8 0.5 - 1.4 mg/dL    Calcium 8.3 (L) 8.7 - 10.5 mg/dL    Anion Gap 8 8 - 16 mmol/L   Basic metabolic panel    Collection Time: 04/04/23  5:40 AM   Result Value Ref Range    Sodium 137 136 - 145 mmol/L    Potassium 4.6 3.5 - 5.1 mmol/L    Chloride 106 95 - 110 mmol/L    CO2 21 (L) 23 - 29 mmol/L    BUN 14 6 - 20 mg/dL    Creatinine 1.0 0.5 - 1.4 mg/dL    Calcium 8.9 8.7 - 10.5 mg/dL    Anion Gap 10 8 - 16 mmol/L     Recent Results (from the past 336 hour(s))   CBC auto differential    Collection Time: 04/05/23  9:45 AM   Result Value Ref Range    WBC 13.46 (H) 3.90 - 12.70 K/uL    Hemoglobin 10.6 (L) 12.0 - 16.0 g/dL    Hematocrit 33.4 (L) 37.0 - 48.5 %    Platelets 285 150 - 450 K/uL   CBC with Auto Differential    Collection Time: 04/04/23  5:40 AM   Result Value Ref Range    WBC 17.61 (H) 3.90 - 12.70 K/uL    Hemoglobin 10.6 (L) 12.0 - 16.0 g/dL    Hematocrit 34.2 (L) 37.0 - 48.5 %    Platelets 322 150 - 450 K/uL   CBC Auto Differential    Collection Time: 03/30/23  8:58 AM   Result Value Ref Range    WBC 7.13 3.90 - 12.70 K/uL    Hemoglobin 11.5 (L) 12.0 - 16.0 g/dL    Hematocrit 38.0 37.0 - 48.5 %    Platelets 363 150 -  450 K/uL     Microbiology Results (last 7 days)       Procedure Component Value Units Date/Time    Tissue culture [[id number]] Collected: 04/03/23 1716    Order Status: Completed Specimen: Tissue from Kidney, Right Updated: 04/05/23 0729     Aerobic Culture - Tissue No growth     Gram Stain Result Rare Gram negative rods    Narrative:      Kidney Stone Culture                Assessment    Large right renal stone burden POD#3 s/p R PCNL  Solitary kidney  Recurrent ESBL E. Coli UTI  Neurogenic bladder s/p Bladder augmentation during childhood    Plan    Continue Merrem  Gentle hydration  relook procedure today  Leukocytosis resolved  Encouraged use of SCDs/Ambulation  Incentive spirometry  Appreciate hospital medicine's assistance.

## 2023-04-06 NOTE — SUBJECTIVE & OBJECTIVE
Interval History: Patient tolerated procedure well. Reports pain however currently manageable.     Review of Systems   Constitutional:  Negative for fatigue and fever.   HENT:  Negative for sinus pressure.    Eyes:  Negative for visual disturbance.   Respiratory:  Negative for shortness of breath.    Cardiovascular:  Negative for chest pain.   Gastrointestinal:  Negative for nausea and vomiting.   Genitourinary:  Positive for flank pain. Negative for difficulty urinating.   Musculoskeletal:  Negative for back pain.   Skin:  Negative for rash.   Neurological:  Negative for headaches.   Psychiatric/Behavioral:  Negative for confusion.    Objective:     Vital Signs (Most Recent):  Temp: 98.5 °F (36.9 °C) (04/06/23 1352)  Pulse: 82 (04/06/23 1352)  Resp: 16 (04/06/23 1352)  BP: 133/75 (04/06/23 1352)  SpO2: 96 % (04/06/23 1352) Vital Signs (24h Range):  Temp:  [97 °F (36.1 °C)-98.8 °F (37.1 °C)] 98.5 °F (36.9 °C)  Pulse:  [70-94] 82  Resp:  [14-26] 16  SpO2:  [94 %-100 %] 96 %  BP: (104-147)/(57-76) 133/75     Weight: 87.8 kg (193 lb 9 oz)  Body mass index is 39.1 kg/m².    Intake/Output Summary (Last 24 hours) at 4/6/2023 1451  Last data filed at 4/6/2023 1244  Gross per 24 hour   Intake 1333.89 ml   Output 800 ml   Net 533.89 ml      Physical Exam  Constitutional:       General: She is not in acute distress.     Appearance: She is well-developed. She is not diaphoretic.   HENT:      Head: Normocephalic and atraumatic.   Eyes:      Pupils: Pupils are equal, round, and reactive to light.   Cardiovascular:      Rate and Rhythm: Normal rate and regular rhythm.      Heart sounds: Normal heart sounds. No murmur heard.    No friction rub. No gallop.   Pulmonary:      Effort: Pulmonary effort is normal. No respiratory distress.      Breath sounds: Normal breath sounds. No stridor. No wheezing or rales.   Abdominal:      General: Bowel sounds are normal. There is no distension.      Palpations: Abdomen is soft. There is no  mass.      Tenderness: There is no abdominal tenderness. There is no guarding.   Genitourinary:     Comments: Right percutaneous nephrostomy tube  Musculoskeletal:      Right lower leg: No edema.      Left lower leg: No edema.   Skin:     General: Skin is warm.      Findings: No erythema.   Neurological:      Mental Status: She is alert and oriented to person, place, and time.       Significant Labs: All pertinent labs within the past 24 hours have been reviewed.    Significant Imaging: I have reviewed all pertinent imaging results/findings within the past 24 hours.

## 2023-04-06 NOTE — NURSING
POC reviewed with patient. Pt verbalized understanding  C/o pain to right flank. Moderately controlled by PRN meds and relaxation techniques.   AAO x4. VSS  RUE PICC line infusing IVF; IV antibiotics maintained  Ann clean, dry, intact  No other c/o at this time.  Pt remains free of injuries and falls; fall precaution in place.   Bed low, side rails x2, call light in reach, personal belongings at bedside, bed alarm in use  Reminded to call for assistance.  Repositioned independently.  Hourly rounding complete. 12 hr chart check complete. Will continue to monitor.

## 2023-04-06 NOTE — PLAN OF CARE
Pt resting on bed s/p re-look PCNL & Nephrostomy tube placement performed under general anesthesia by Dr. Reed. Respirations even and unlabored on 2L O2 via NC with O2 sats of 96%. VSS. Will cont to monitor. See flow sheet for detailed assessment.

## 2023-04-06 NOTE — ANESTHESIA PREPROCEDURE EVALUATION
04/06/2023  Addis Delvalle is a 37 y.o., female, had Perc Nephro 3 days prior under [facility], now w/ concern for infection (ESBL, MDRO)    Patient Active Problem List   Diagnosis    Neurogenic bladder    Chronic UTI    Staghorn calculus    Spina bifida    Congenitally solitary right kidney    Pre-op evaluation    Insomnia    Hydrocephalus     Past Medical History:   Diagnosis Date    Spina bifida     Spina bifida      Past Surgical History:   Procedure Laterality Date    BLADDER AUGMENTATION      CSF SHUNT      INSERTION OF SUPRAPUBIC CATHETER  1990       Pre-op Assessment    I have reviewed the Patient Summary Reports.     I have reviewed the Nursing Notes. I have reviewed the NPO Status.   I have reviewed the Medications.     Review of Systems  Anesthesia Hx:  No problems with previous Anesthesia  History of prior surgery of interest to airway management or planning: Previous anesthesia: General Denies Family Hx of Anesthesia complications.   Denies Personal Hx of Anesthesia complications.   Social:  Non-Smoker    Hematology/Oncology:     Oncology Normal    -- Anemia: Hematology Comments: 11.1/35.4    EENT/Dental:EENT/Dental Normal   Cardiovascular:  Cardiovascular Normal Exercise tolerance: good  ECG has been reviewed. Cleared by internist for procedure on 4/3/23    Sinus tachycardia (108)  Nonspecific T wave abnormality   Abnormal ECG   No previous ECGs available   Confirmed by JENNIFER RETANA, JJ ([id number]) on 12/19/2022 6:53:22 AM    Pulmonary:  Pulmonary Normal    Renal/:   Chronic Renal Disease Solitary kidney  Staghorn calculus   Hepatic/GI:  Hepatic/GI Normal    Musculoskeletal:  Musculoskeletal Normal    Neurological:  Neurology Normal Climbs stairs without difficulty   shunt   Endocrine:  Endocrine Normal bmi 39 Obesity / BMI > 30  Dermatological:  Skin Normal    Psych:  Psychiatric  Normal           Chemistry        Component Value Date/Time     04/06/2023 0756    K 4.6 04/06/2023 0756     04/06/2023 0756    CO2 25 04/06/2023 0756    BUN 8 04/06/2023 0756    CREATININE 0.7 04/06/2023 0756     04/06/2023 0756        Component Value Date/Time    CALCIUM 8.6 (L) 04/06/2023 0756    ALKPHOS 60 01/12/2023 1307    AST 13 01/12/2023 1307    ALT 12 01/12/2023 1307    BILITOT 0.4 01/12/2023 1307    ESTGFRAFRICA >60.0 06/22/2022 1050    EGFRNONAA >60.0 06/22/2022 1050        Lab Results   Component Value Date    WBC 10.20 04/06/2023    HGB 11.1 (L) 04/06/2023    HCT 35.4 (L) 04/06/2023    MCV 87 04/06/2023     04/06/2023       Physical Exam  General: Alert, Oriented, Cooperative and Well nourished  bmi 39  Airway:  Mallampati: II   Mouth Opening: Normal  TM Distance: Normal  Tongue: Normal  Neck ROM: Normal ROM        Anesthesia Plan  Type of Anesthesia, risks & benefits discussed:    Anesthesia Type: Gen ETT  Intra-op Monitoring Plan: Standard ASA Monitors  Post Op Pain Control Plan: multimodal analgesia and IV/PO Opioids PRN  Induction:  IV  Airway Plan: Direct, Post-Induction  Informed Consent: Informed consent signed with the Patient and all parties understand the risks and agree with anesthesia plan.  All questions answered.   ASA Score: 3  Day of Surgery Review of History & Physical: H&P Update referred to the surgeon/provider.  Anesthesia Plan Notes: Intubation:     Induction:  Intravenous    Intubated:  Postinduction    Mask Ventilation:  Easy mask    Attempts:  1    Attempted By:  CRNA    Method of Intubation:  Direct    Blade:  [place] 3    Laryngeal View Grade: Grade I - full view of cords      Difficult Airway Encountered?: No      Complications:  None    Airway Device:  Oral endotracheal tube    Airway Device Size:  7.5    Style/Cuff Inflation:  Cuffed (inflated to minimal occlusive pressure)    Tube secured:  19    Secured at:  The lips    Placement Verified By:   Capnometry and Revisualization with laryngoscopy    Complicating Factors:  None    Findings Post-Intubation:  BS equal bilateral and atraumatic/condition of teeth unchanged       Ready For Surgery From Anesthesia Perspective.     .

## 2023-04-06 NOTE — ASSESSMENT & PLAN NOTE
S/p right PCN tube placement  Cont percocet  Ok to add prn dilaudid if necessary  relook procedure today by urology  Cont sarina per urology

## 2023-04-06 NOTE — PROGRESS NOTES
[facility]  Progress Note    Patient Name: Addis Delvalle  MRN: [id number]  Patient Class: IP- Inpatient   Admission Date: 4/3/2023  Length of Stay: 3 days  Attending Physician: Merle Reed MD  Primary Care Provider: Madie Horn MD        Subjective:     Principal Problem:Staghorn calculus        HPI:  Patient is a 38 y/o  female with a PMH of staghorn calculus, ESBL UTI, neurogenic bladder, spina bifida, insomnia who is s/p right nephrostomy tube placement along with right percutaneous lithotripsy consulted for medical management. Patient tolerated procedure well. Pain is controlled however she does not like the morphine as it makes her nauseous. Patient is currently on empiric merrem ordered by primary team.       Overview/Hospital Course:  4/5: pt reports pain from percutaneous nephrostomy site. Pain meds changed to percocet. Cont merrem per urology. Relook procedure tomorrow. Will make npo at midnight.   4/6: relook procedure today. Tolerated procedure well. Cont merrem per urology.       Interval History: Patient tolerated procedure well. Reports pain however currently manageable.     Review of Systems   Constitutional:  Negative for fatigue and fever.   HENT:  Negative for sinus pressure.    Eyes:  Negative for visual disturbance.   Respiratory:  Negative for shortness of breath.    Cardiovascular:  Negative for chest pain.   Gastrointestinal:  Negative for nausea and vomiting.   Genitourinary:  Positive for flank pain. Negative for difficulty urinating.   Musculoskeletal:  Negative for back pain.   Skin:  Negative for rash.   Neurological:  Negative for headaches.   Psychiatric/Behavioral:  Negative for confusion.    Objective:     Vital Signs (Most Recent):  Temp: 98.5 °F (36.9 °C) (04/06/23 1352)  Pulse: 82 (04/06/23 1352)  Resp: 16 (04/06/23 1352)  BP: 133/75 (04/06/23 1352)  SpO2: 96 % (04/06/23 1352) Vital Signs (24h Range):  Temp:  [97 °F (36.1  °C)-98.8 °F (37.1 °C)] 98.5 °F (36.9 °C)  Pulse:  [70-94] 82  Resp:  [14-26] 16  SpO2:  [94 %-100 %] 96 %  BP: (104-147)/(57-76) 133/75     Weight: 87.8 kg (193 lb 9 oz)  Body mass index is 39.1 kg/m².    Intake/Output Summary (Last 24 hours) at 4/6/2023 1451  Last data filed at 4/6/2023 1244  Gross per 24 hour   Intake 1333.89 ml   Output 800 ml   Net 533.89 ml      Physical Exam  Constitutional:       General: She is not in acute distress.     Appearance: She is well-developed. She is not diaphoretic.   HENT:      Head: Normocephalic and atraumatic.   Eyes:      Pupils: Pupils are equal, round, and reactive to light.   Cardiovascular:      Rate and Rhythm: Normal rate and regular rhythm.      Heart sounds: Normal heart sounds. No murmur heard.    No friction rub. No gallop.   Pulmonary:      Effort: Pulmonary effort is normal. No respiratory distress.      Breath sounds: Normal breath sounds. No stridor. No wheezing or rales.   Abdominal:      General: Bowel sounds are normal. There is no distension.      Palpations: Abdomen is soft. There is no mass.      Tenderness: There is no abdominal tenderness. There is no guarding.   Genitourinary:     Comments: Right percutaneous nephrostomy tube  Musculoskeletal:      Right lower leg: No edema.      Left lower leg: No edema.   Skin:     General: Skin is warm.      Findings: No erythema.   Neurological:      Mental Status: She is alert and oriented to person, place, and time.       Significant Labs: All pertinent labs within the past 24 hours have been reviewed.    Significant Imaging: I have reviewed all pertinent imaging results/findings within the past 24 hours.      Assessment/Plan:      * Staghorn calculus  S/p right PCN tube placement  Cont percocet  Ok to add prn dilaudid if necessary  relook procedure today by urology  Cont merrem per urology       Insomnia  Trazodone qhs       Chronic UTI  Hx of esbl uti   Will repeat u/a and culture         VTE Risk Mitigation  (From admission, onward)         Ordered     Place sequential compression device  Until discontinued         04/05/23 1237                Discharge Planning   ANGELES:      Code Status: Full Code   Is the patient medically ready for discharge?:     Reason for patient still in hospital (select all that apply): Patient trending condition  Discharge Plan A: Home, Home with family                  Franky Chavez MD  Department of Hospital Medicine   [facility] [facility] ([facility])

## 2023-04-06 NOTE — NURSING
Pt has a large amount of drainage from nephrostomy tube insertion site. Bed sheet and sandy pads saturated through. Dressing changed and linens changed. Notified Merle Reed MD. Instructed to change dressing as needed. Will continue to monitor.

## 2023-04-06 NOTE — TRANSFER OF CARE
"Anesthesia Transfer of Care Note    Patient: Addis Delvalle    Procedure(s) Performed: Procedure(s) (LRB):  NEPHROSTOLITHOTOMY, PERCUTANEOUS (Right)    Patient location: PACU    Anesthesia Type: general    Transport from OR: Transported from OR on room air with adequate spontaneous ventilation    Post pain: adequate analgesia    Post assessment: no apparent anesthetic complications    Post vital signs: stable    Level of consciousness: sedated    Nausea/Vomiting: no nausea/vomiting    Complications: none    Transfer of care protocol was followed      Last vitals:   Visit Vitals  /72 (BP Location: Left arm, Patient Position: Lying)   Pulse 76   Temp 36.9 °C (98.5 °F) (Oral)   Resp 18   Ht 4' 11" (1.499 m)   Wt 87.8 kg (193 lb 9 oz)   SpO2 95%   Breastfeeding No   BMI 39.10 kg/m²     "

## 2023-04-06 NOTE — ANESTHESIA POSTPROCEDURE EVALUATION
Anesthesia Post Evaluation    Patient: Addis Delvalle    Procedure(s) Performed: Procedure(s) (LRB):  NEPHROSTOLITHOTOMY, PERCUTANEOUS (Right)  NEPHROSTOGRAM (Right)  EXTRACTION - STONE (Right)  REPLACEMENT, NEPHROSTOMY TUBE (Right)    Final Anesthesia Type: general      Patient location during evaluation: PACU  Patient participation: Yes- Able to Participate  Level of consciousness: awake and alert and oriented  Post-procedure vital signs: reviewed and stable  Pain management: adequate  Airway patency: patent  TAE mitigation strategies: Verification of full reversal of neuromuscular block  PONV status at discharge: No PONV  Anesthetic complications: no      Cardiovascular status: blood pressure returned to baseline and hemodynamically stable  Respiratory status: unassisted  Hydration status: euvolemic  Follow-up not needed.          Vitals Value Taken Time   /64 04/06/23 1315   Temp 36.6 °C (97.9 °F) 04/06/23 1250   Pulse 84 04/06/23 1315   Resp 18 04/06/23 1317   SpO2 100 % 04/06/23 1315         No case tracking events are documented in the log.      Pain/Carlos Score: Pain Rating Prior to Med Admin: -- (facial grimmacing) (4/6/2023  1:17 PM)  Pain Rating Post Med Admin: 3 (4/6/2023  3:25 AM)  Carlos Score: 8 (4/6/2023 12:50 PM)

## 2023-04-07 VITALS
SYSTOLIC BLOOD PRESSURE: 142 MMHG | WEIGHT: 194.44 LBS | DIASTOLIC BLOOD PRESSURE: 68 MMHG | OXYGEN SATURATION: 99 % | BODY MASS INDEX: 39.2 KG/M2 | HEIGHT: 59 IN | HEART RATE: 64 BPM | RESPIRATION RATE: 18 BRPM | TEMPERATURE: 98 F

## 2023-04-07 DIAGNOSIS — N22 CALCULUS OF URINARY TRACT IN DISEASES CLASSIFIED ELSEWHERE: Primary | ICD-10-CM

## 2023-04-07 DIAGNOSIS — R31.9 URINARY TRACT INFECTION WITH HEMATURIA, SITE UNSPECIFIED: ICD-10-CM

## 2023-04-07 DIAGNOSIS — N39.0 URINARY TRACT INFECTION WITH HEMATURIA, SITE UNSPECIFIED: ICD-10-CM

## 2023-04-07 LAB
BACTERIA #/AREA URNS HPF: ABNORMAL /HPF
BILIRUB UR QL STRIP: NEGATIVE
CAOX CRY URNS QL MICRO: ABNORMAL
CLARITY UR: ABNORMAL
COLOR UR: ABNORMAL
ERYTHROCYTE [DISTWIDTH] IN BLOOD BY AUTOMATED COUNT: 13.1 % (ref 11.5–14.5)
GLUCOSE UR QL STRIP: NEGATIVE
HCT VFR BLD AUTO: 33.9 % (ref 37–48.5)
HGB BLD-MCNC: 10.6 G/DL (ref 12–16)
HGB UR QL STRIP: ABNORMAL
HYALINE CASTS #/AREA URNS LPF: 0 /LPF
KETONES UR QL STRIP: NEGATIVE
LEUKOCYTE ESTERASE UR QL STRIP: ABNORMAL
MCH RBC QN AUTO: 27.7 PG (ref 27–31)
MCHC RBC AUTO-ENTMCNC: 31.3 G/DL (ref 32–36)
MCV RBC AUTO: 89 FL (ref 82–98)
MICROSCOPIC COMMENT: ABNORMAL
NITRITE UR QL STRIP: NEGATIVE
PH UR STRIP: 8 [PH] (ref 5–8)
PLATELET # BLD AUTO: 297 K/UL (ref 150–450)
PMV BLD AUTO: 9.6 FL (ref 9.2–12.9)
PROT UR QL STRIP: ABNORMAL
RBC # BLD AUTO: 3.82 M/UL (ref 4–5.4)
RBC #/AREA URNS HPF: >100 /HPF (ref 0–4)
SP GR UR STRIP: 1.01 (ref 1–1.03)
URN SPEC COLLECT METH UR: ABNORMAL
UROBILINOGEN UR STRIP-ACNC: ABNORMAL EU/DL
WBC # BLD AUTO: 9.29 K/UL (ref 3.9–12.7)
WBC #/AREA URNS HPF: 83 /HPF (ref 0–5)
WBC CLUMPS URNS QL MICRO: ABNORMAL
YEAST URNS QL MICRO: ABNORMAL

## 2023-04-07 PROCEDURE — G0378 HOSPITAL OBSERVATION PER HR: HCPCS

## 2023-04-07 PROCEDURE — 87106 FUNGI IDENTIFICATION YEAST: CPT | Performed by: FAMILY MEDICINE

## 2023-04-07 PROCEDURE — 96361 HYDRATE IV INFUSION ADD-ON: CPT

## 2023-04-07 PROCEDURE — 85027 COMPLETE CBC AUTOMATED: CPT | Performed by: UROLOGY

## 2023-04-07 PROCEDURE — 63600175 PHARM REV CODE 636 W HCPCS: Performed by: UROLOGY

## 2023-04-07 PROCEDURE — 99024 PR POST-OP FOLLOW-UP VISIT: ICD-10-PCS | Mod: ,,, | Performed by: UROLOGY

## 2023-04-07 PROCEDURE — 36415 COLL VENOUS BLD VENIPUNCTURE: CPT | Performed by: UROLOGY

## 2023-04-07 PROCEDURE — 96366 THER/PROPH/DIAG IV INF ADDON: CPT

## 2023-04-07 PROCEDURE — 25000003 PHARM REV CODE 250: Performed by: FAMILY MEDICINE

## 2023-04-07 PROCEDURE — 25000003 PHARM REV CODE 250: Performed by: UROLOGY

## 2023-04-07 PROCEDURE — 96365 THER/PROPH/DIAG IV INF INIT: CPT

## 2023-04-07 PROCEDURE — 99024 POSTOP FOLLOW-UP VISIT: CPT | Mod: ,,, | Performed by: UROLOGY

## 2023-04-07 PROCEDURE — 87086 URINE CULTURE/COLONY COUNT: CPT | Performed by: FAMILY MEDICINE

## 2023-04-07 PROCEDURE — 81000 URINALYSIS NONAUTO W/SCOPE: CPT | Performed by: FAMILY MEDICINE

## 2023-04-07 RX ORDER — POLYETHYLENE GLYCOL 3350 17 G/17G
17 POWDER, FOR SOLUTION ORAL DAILY
Status: DISCONTINUED | OUTPATIENT
Start: 2023-04-07 | End: 2023-04-07 | Stop reason: HOSPADM

## 2023-04-07 RX ORDER — SIMETHICONE 80 MG
1 TABLET,CHEWABLE ORAL 3 TIMES DAILY PRN
Status: DISCONTINUED | OUTPATIENT
Start: 2023-04-07 | End: 2023-04-07 | Stop reason: HOSPADM

## 2023-04-07 RX ORDER — OXYCODONE AND ACETAMINOPHEN 10; 325 MG/1; MG/1
1 TABLET ORAL EVERY 6 HOURS PRN
Qty: 20 TABLET | Refills: 0 | Status: SHIPPED | OUTPATIENT
Start: 2023-04-07

## 2023-04-07 RX ORDER — ONDANSETRON 4 MG/1
4 TABLET, ORALLY DISINTEGRATING ORAL EVERY 8 HOURS PRN
Qty: 20 TABLET | Refills: 0 | Status: SHIPPED | OUTPATIENT
Start: 2023-04-07

## 2023-04-07 RX ADMIN — MEROPENEM AND SODIUM CHLORIDE 500 MG: 500 INJECTION, SOLUTION INTRAVENOUS at 09:04

## 2023-04-07 RX ADMIN — ERTAPENEM 1 G: 1 INJECTION INTRAMUSCULAR; INTRAVENOUS at 02:04

## 2023-04-07 RX ADMIN — OXYCODONE AND ACETAMINOPHEN 1 TABLET: 7.5; 325 TABLET ORAL at 07:04

## 2023-04-07 RX ADMIN — MEROPENEM AND SODIUM CHLORIDE 500 MG: 500 INJECTION, SOLUTION INTRAVENOUS at 02:04

## 2023-04-07 RX ADMIN — SIMETHICONE 80 MG: 80 TABLET, CHEWABLE ORAL at 02:04

## 2023-04-07 RX ADMIN — POLYETHYLENE GLYCOL 3350 17 G: 17 POWDER, FOR SOLUTION ORAL at 09:04

## 2023-04-07 RX ADMIN — SODIUM CHLORIDE: 9 INJECTION, SOLUTION INTRAVENOUS at 07:04

## 2023-04-07 RX ADMIN — ACETAMINOPHEN 650 MG: 325 TABLET ORAL at 02:04

## 2023-04-07 NOTE — CONSULTS
1st dose of Ertapenem to be given at the hospital. Updated order for remaining 6 doses sent to [facility].

## 2023-04-07 NOTE — PROGRESS NOTES
[facility] Follow up 2023    Subjective:     The patient is currently admitted POD#4 s/p R PCNL of solitary kidney with relook procedure yesterday. Pt reports pain is improved. No nausea/vomiting.     Current Facility-Administered Medications   Medication Dose Route Frequency Provider Last Rate Last Admin    0.9%  NaCl infusion   Intravenous Continuous Merle Reed MD 75 mL/hr at 23 0737 New Bag at 23 0737    acetaminophen tablet 650 mg  650 mg Oral Q6H PRN Merle Reed MD   650 mg at 23 0224    ertapenem (INVANZ) 1 g in sodium chloride 0.9 % 100 mL IVPB (MB+)  1 g Intravenous Q24H Merle Reed MD        HYDROmorphone (PF) injection 0.2 mg  0.2 mg Intravenous Q5 Min PRN Jeremias Beckett MD   0.2 mg at 23 1317    ondansetron injection 4 mg  4 mg Intravenous Q8H PRN Merle Reed MD   4 mg at 23 1935    oxyCODONE-acetaminophen  mg per tablet 1 tablet  1 tablet Oral Q4H PRN Franky Chavez MD   1 tablet at 23 2352    oxyCODONE-acetaminophen 7.5-325 mg per tablet 1 tablet  1 tablet Oral Q4H PRN Franky Chavez MD   1 tablet at 23 0732    polyethylene glycol packet 17 g  17 g Oral Daily Merle Reed MD   17 g at 23 0932    sodium chloride 0.9% flush 3 mL  3 mL Intravenous PRN Merle Reed MD        traZODone tablet 50 mg  50 mg Oral QHS Merle Reed MD   50 mg at 23 2153       Objective:     Vitals:    23 0741   BP: (!) 142/68   Pulse: 64   Resp: 18   Temp: 98.3 °F (36.8 °C)     I/O last 3 completed shifts:  In: 800 [I.V.:800]  Out: 650 [Urine:650]  Temp (24hrs), Av °F (36.7 °C), Min:97 °F (36.1 °C), Max:98.8 °F (37.1 °C)      General: Well-developed, well-nourished in No acute distress  HEENT: Normocephalic, atraumatic  Respirations: Even and unlabored  Abdomen: soft, Non-tender, non-distended, no palpable masses, no rebound or guarding  Flank: Dressing in place over right flank with improvement in tenderness. PCN  tube draining clear yellow urine  : calderon catheter draining small amounts of yellow urine  Extremities: Moves all equally, no clubbing, cyanosis or edema  Skin: warm and dry, no lesions or rash  Neuro: cooperative, Cranial nerves II-XII grossly intact  Psych: normal affect    Labs    Recent Results (from the past 336 hour(s))   Basic metabolic panel    Collection Time: 04/06/23  7:56 AM   Result Value Ref Range    Sodium 139 136 - 145 mmol/L    Potassium 4.6 3.5 - 5.1 mmol/L    Chloride 107 95 - 110 mmol/L    CO2 25 23 - 29 mmol/L    BUN 8 6 - 20 mg/dL    Creatinine 0.7 0.5 - 1.4 mg/dL    Calcium 8.6 (L) 8.7 - 10.5 mg/dL    Anion Gap 7 (L) 8 - 16 mmol/L   Basic metabolic panel    Collection Time: 04/05/23  9:45 AM   Result Value Ref Range    Sodium 138 136 - 145 mmol/L    Potassium 4.0 3.5 - 5.1 mmol/L    Chloride 106 95 - 110 mmol/L    CO2 24 23 - 29 mmol/L    BUN 10 6 - 20 mg/dL    Creatinine 0.8 0.5 - 1.4 mg/dL    Calcium 8.3 (L) 8.7 - 10.5 mg/dL    Anion Gap 8 8 - 16 mmol/L   Basic metabolic panel    Collection Time: 04/04/23  5:40 AM   Result Value Ref Range    Sodium 137 136 - 145 mmol/L    Potassium 4.6 3.5 - 5.1 mmol/L    Chloride 106 95 - 110 mmol/L    CO2 21 (L) 23 - 29 mmol/L    BUN 14 6 - 20 mg/dL    Creatinine 1.0 0.5 - 1.4 mg/dL    Calcium 8.9 8.7 - 10.5 mg/dL    Anion Gap 10 8 - 16 mmol/L     Recent Results (from the past 336 hour(s))   CBC auto differential    Collection Time: 04/05/23  9:45 AM   Result Value Ref Range    WBC 13.46 (H) 3.90 - 12.70 K/uL    Hemoglobin 10.6 (L) 12.0 - 16.0 g/dL    Hematocrit 33.4 (L) 37.0 - 48.5 %    Platelets 285 150 - 450 K/uL   CBC with Auto Differential    Collection Time: 04/04/23  5:40 AM   Result Value Ref Range    WBC 17.61 (H) 3.90 - 12.70 K/uL    Hemoglobin 10.6 (L) 12.0 - 16.0 g/dL    Hematocrit 34.2 (L) 37.0 - 48.5 %    Platelets 322 150 - 450 K/uL   CBC Auto Differential    Collection Time: 03/30/23  8:58 AM   Result Value Ref Range    WBC 7.13 3.90 -  12.70 K/uL    Hemoglobin 11.5 (L) 12.0 - 16.0 g/dL    Hematocrit 38.0 37.0 - 48.5 %    Platelets 363 150 - 450 K/uL     Microbiology Results (last 7 days)       Procedure Component Value Units Date/Time    Urine culture [[id number]] Collected: 04/07/23 0038    Order Status: No result Specimen: Urine Updated: 04/07/23 0130    Tissue culture [[id number]] Collected: 04/03/23 1716    Order Status: Completed Specimen: Tissue from Kidney, Right Updated: 04/05/23 0729     Aerobic Culture - Tissue No growth     Gram Stain Result Rare Gram negative rods    Narrative:      Kidney Stone Culture                Assessment    Large right renal stone burden POD#4 s/p R PCNL with relook yesterday  Solitary kidney  Recurrent ESBL E. Coli UTI with positive gram stain of stone  Neurogenic bladder s/p Bladder augmentation during childhood    Plan    Discussed pt with ID, who recommended continuing another 7 days of IV Ertapenem upon discharge, planned for today. Will have  arrange through PICC line.   Leukocytosis resolved  Appreciate hospital medicine's assistance.   Will remove calderon. Nephrostomy tube to stay in place for 10 days and will reimage prior to removal.

## 2023-04-07 NOTE — NURSING
Telemetry monitor removed and returned to monitor tech. SEAN PICC line remains in place for home IV antibiotics per orders. Discharge instructions reviewed with patient/family including discharge medications, follow-up appointments, diet, and activity restrictions. Right nephrostomy tube remains in place. Dressing changed this shift. Patient verbalizes understanding and voices no concerns. All personal belongings left with patient. Discharged home via wheelchair to private vehicle in no acute distress for family to bring home.

## 2023-04-07 NOTE — PLAN OF CARE
[facility] - Telemetry (Hospital)  Discharge Final Note    Primary Care Provider: Madie Horn MD    Expected Discharge Date: 4/7/2023    Final Discharge Note (most recent)       Final Note - 04/07/23 1053          Final Note    Assessment Type Final Discharge Note     Anticipated Discharge Disposition Home or Self Care     Hospital Resources/Appts/Education Provided Appointments scheduled and added to AVS        Post-Acute Status    Post-Acute Authorization IV Infusion     IV Infusion Status Referral(s) sent     Discharge Delays None known at this time                     Important Message from Medicare             Contact Info       Merle Reed MD   Specialty: Urology    [address]  [place] [address]   Phone: [phone]       Next Steps: Follow up          PCP follow up scheduled:  Hospital Follow Up 3:20 PM   Madie Horn MD  [facility]     [facility] ([place], [place]). Messaged Dr. Reed's office to schedule urology follow up.

## 2023-04-07 NOTE — DISCHARGE SUMMARY
[facility])  Urology  Discharge Summary      Patient Name: Addis Delvalle  MRN: [id number]  Admission Date: 4/3/2023  Hospital Length of Stay: 4 days  Discharge Date and Time:  04/07/2023 10:36 AM  Attending Physician: Merle Reed MD   Discharging Provider: Merle Reed MD  Primary Care Physician: Madie Horn MD    HPI:   38yo female with h/o a large right staghorn calculus, solitary kidney, spina bifida with bladder augmentation as a child and persistent ESBL E. Coli UTI. She was preoperative treated with 2 weeks of Ertapenem and presented to the hospital for PCNL.     Procedure(s) (LRB):  NEPHROSTOLITHOTOMY, PERCUTANEOUS (Right)  NEPHROSTOGRAM (Right)  EXTRACTION - STONE (Right)  REPLACEMENT, NEPHROSTOMY TUBE (Right)     Indwelling Lines/Drains at time of discharge:   Lines/Drains/Airways     Peripherally Inserted Central Catheter Line  Duration           PICC Double Lumen 03/17/23 1441 right brachial 20 days          Drain  Duration                Suprapubic Catheter -- days         Suprapubic Catheter -- days         Urethral Catheter 04/03/23 1430 Straight-tip;Silicone 16 Fr. 3 days         Nephrostomy 04/06/23 1204 Right 10 Fr. <1 day                Hospital Course (synopsis of major diagnoses, care, treatment, and services provided during the course of the hospital stay): Patient underwent R PCNL on 4/3/23. Stone was sent for culture and analysis. Gram stain was positive for gram negative rods. She did have leukocytosis.  Post-op, she was treated with IV Merrem. On POD#1, she underwent CT scan showing remaining stones. On POD#3, she underwent relook PCNL with complete visual clearance, though it was felt that there was a stone seen on CT scan that may not have been visualized. PCN tube was exchanged for a 10fr PCN tube. Her case was discussed with the ID team, who recommended another week of IV ertapenem upon discharge due to positive gram stain of stone.     Goals of Care  Treatment Preferences:  Code Status: Full Code      Consults:   Consults (From admission, onward)        Status Ordering Provider     Inpatient consult to Social Work  Once        Provider:  (Not yet assigned)   See Hyperspace for full Linked Orders Report.    Ordered PORFIRIO ZARAGOZA     Inpatient consult to Infectious Diseases  Once        Provider:  Lui English MD, FIDSA    Acknowledged PORFIRIO ZARAGOZA     Inpatient consult to Hospitalist  Once        Provider:  (Not yet assigned)    Acknowledged PORFIRIO ZAARGOZA          Significant Diagnostic Studies: POD1 CT scan showing persistent stone burden, addressed on POD#3.     Pending Diagnostic Studies:     Procedure Component Value Units Date/Time    IR Antegrade Pyelogram (xpd) [[id number]] Resulted: 04/06/23 1002    Order Status: Sent Lab Status: In process Updated: 04/06/23 1236    IR Antegrade Pyelogram (xpd) [[id number]] Resulted: 04/03/23 1251    Order Status: Sent Lab Status: In process Updated: 04/03/23 1806    Specimen to Pathology, Surgery Urology [[id number]] Collected: 04/06/23 1240    Order Status: Sent Lab Status: In process Updated: 04/06/23 1327    Specimen: Tissue     Specimen to Pathology, Surgery Urology [[id number]] Collected: 04/03/23 1716    Order Status: Sent Lab Status: In process Updated: 04/04/23 0830    Specimen: Tissue     Urinary Stone Analysis [[id number]] Collected: 04/06/23 1247    Order Status: Sent Lab Status: In process Updated: 04/06/23 1330    Specimen: Stone           Final Active Diagnoses:    Diagnosis Date Noted POA    PRINCIPAL PROBLEM:  Staghorn calculus [N20.0] 12/21/2022 Yes    Insomnia [G47.00] 03/30/2023 Yes    Chronic UTI [N39.0] 01/11/2020 Yes    Neurogenic bladder [N31.9] 01/10/2020 Yes      Problems Resolved During this Admission:         Discharged Condition: good    Disposition: Home or Self Care    Follow Up:   Follow-up Information     Porfirio Zaragoza MD Follow up.    Specialty: Urology  Contact  information:  [address] [facility] [place] [address]  [phone]                       Patient Instructions:      Diet Adult Regular     Notify your health care provider if you experience any of the following:  temperature >100.4     Notify your health care provider if you experience any of the following:  persistent nausea and vomiting or diarrhea     Notify your health care provider if you experience any of the following:  severe uncontrolled pain     Notify your health care provider if you experience any of the following:  redness, tenderness, or signs of infection (pain, swelling, redness, odor or green/yellow discharge around incision site)     Change dressing (specify)   Order Comments: Dressing change: daily and as needed to keep flank clean and dry.     Activity as tolerated     Medications:  Reconciled Home Medications:      Medication List      START taking these medications    ondansetron 4 MG Tbdl  Commonly known as: ZOFRAN-ODT  Take 1 tablet (4 mg total) by mouth every 8 (eight) hours as needed (nausea).     oxyCODONE-acetaminophen  mg per tablet  Commonly known as: PERCOCET  Take 1 tablet by mouth every 6 (six) hours as needed for Pain.     sodium chloride 0.9 % PgBk 100 mL with ertapenem 1 gram SolR 1 g  Inject 1 g into the vein once daily.        CONTINUE taking these medications    ISIBLOOM 0.15-0.03 mg per tablet  Generic drug: desogestreL-ethinyl estradioL  TAKE 1 TABLET DAILY FOR BIRTH CONTROL     traZODone 50 MG tablet  Commonly known as: DESYREL  Take 1 tablet (50 mg total) by mouth every evening.     triamcinolone acetonide 0.1% 0.1 % ointment  Commonly known as: KENALOG  Apply topically 2 (two) times daily.            Time spent on the discharge of patient: 20 minutes    Merle Reed MD  Urology  [facility] - Telemetry ([facility])

## 2023-04-08 LAB
BACTERIA TISS AEROBE CULT: NO GROWTH
BACTERIA UR CULT: ABNORMAL
GRAM STN SPEC: NORMAL

## 2023-04-09 ENCOUNTER — PATIENT MESSAGE (OUTPATIENT)
Dept: UROLOGY | Facility: CLINIC | Age: 38
End: 2023-04-09
Payer: COMMERCIAL

## 2023-04-10 LAB
COMPN STONE: NORMAL
FINAL PATHOLOGIC DIAGNOSIS: NORMAL
FINAL PATHOLOGIC DIAGNOSIS: NORMAL
GROSS: NORMAL
GROSS: NORMAL
Lab: NORMAL
Lab: NORMAL
SPECIMEN SOURCE: NORMAL
STONE ANALYSIS IR-IMP: NORMAL

## 2023-04-11 ENCOUNTER — LAB VISIT (OUTPATIENT)
Dept: LAB | Facility: HOSPITAL | Age: 38
End: 2023-04-11
Attending: UROLOGY
Payer: COMMERCIAL

## 2023-04-11 ENCOUNTER — PATIENT MESSAGE (OUTPATIENT)
Dept: UROLOGY | Facility: CLINIC | Age: 38
End: 2023-04-11
Payer: COMMERCIAL

## 2023-04-11 ENCOUNTER — TELEPHONE (OUTPATIENT)
Dept: UROLOGY | Facility: CLINIC | Age: 38
End: 2023-04-11
Payer: COMMERCIAL

## 2023-04-11 DIAGNOSIS — N22 CALCULUS OF URINARY TRACT IN DISEASES CLASSIFIED ELSEWHERE: ICD-10-CM

## 2023-04-11 DIAGNOSIS — R31.9 URINARY TRACT INFECTION WITH HEMATURIA, SITE UNSPECIFIED: ICD-10-CM

## 2023-04-11 DIAGNOSIS — N39.0 URINARY TRACT INFECTION WITH HEMATURIA, SITE UNSPECIFIED: ICD-10-CM

## 2023-04-11 LAB
BUN SERPL-MCNC: 17 MG/DL (ref 6–20)
CREAT SERPL-MCNC: 0.8 MG/DL (ref 0.5–1.4)
EST. GFR  (NO RACE VARIABLE): >60 ML/MIN/1.73 M^2

## 2023-04-11 PROCEDURE — 36415 COLL VENOUS BLD VENIPUNCTURE: CPT | Mod: PO | Performed by: UROLOGY

## 2023-04-11 PROCEDURE — 82565 ASSAY OF CREATININE: CPT | Performed by: UROLOGY

## 2023-04-11 PROCEDURE — 84520 ASSAY OF UREA NITROGEN: CPT | Performed by: UROLOGY

## 2023-04-11 NOTE — TELEPHONE ENCOUNTER
Returned call to pt there was no answer       ----- Message from Ida Salcedo sent at 4/11/2023  1:41 PM CDT -----  Contact: dianne  Patient is calling to speak with the nurse regarding questions and concerns. Reports having kidney stone surgery and have questions about the dressing of her back. Please give the patient a call back at .[phone]   Thanks kavon

## 2023-04-12 ENCOUNTER — PATIENT MESSAGE (OUTPATIENT)
Dept: UROLOGY | Facility: CLINIC | Age: 38
End: 2023-04-12
Payer: COMMERCIAL

## 2023-04-12 DIAGNOSIS — R19.7 DIARRHEA, UNSPECIFIED TYPE: Primary | ICD-10-CM

## 2023-04-14 ENCOUNTER — PATIENT MESSAGE (OUTPATIENT)
Dept: UROLOGY | Facility: CLINIC | Age: 38
End: 2023-04-14
Payer: COMMERCIAL

## 2023-04-14 ENCOUNTER — TELEPHONE (OUTPATIENT)
Dept: UROLOGY | Facility: CLINIC | Age: 38
End: 2023-04-14

## 2023-04-16 ENCOUNTER — HOSPITAL ENCOUNTER (EMERGENCY)
Facility: HOSPITAL | Age: 38
Discharge: HOME | End: 2023-04-16
Attending: EMERGENCY MEDICINE
Payer: COMMERCIAL

## 2023-04-16 VITALS
HEIGHT: 59 IN | WEIGHT: 172.75 LBS | TEMPERATURE: 98 F | HEART RATE: 97 BPM | RESPIRATION RATE: 16 BRPM | SYSTOLIC BLOOD PRESSURE: 184 MMHG | OXYGEN SATURATION: 98 % | BODY MASS INDEX: 34.83 KG/M2 | DIASTOLIC BLOOD PRESSURE: 78 MMHG

## 2023-04-16 DIAGNOSIS — Z45.2 PIC LINE (PERIPHERALLY INSERTED CENTRAL CATHETER) REMOVAL: Primary | ICD-10-CM

## 2023-04-16 PROCEDURE — 99281 EMR DPT VST MAYX REQ PHY/QHP: CPT

## 2023-04-16 PROCEDURE — 36589 REMOVAL TUNNELED CV CATH: CPT

## 2023-04-16 NOTE — ED PROVIDER NOTES
HISTORY     Chief Complaint   Patient presents with    Vascular Access Problem     Pt wants PICC line removed and dressing change to back (sx 1 week ago)     Review of patient's allergies indicates:   Allergen Reactions    Cefaclor     Cephalexin     Meperidine      vomiting    Promethazine     Red dye         HPI   37-year-old female presents the emergency department for a PICC line removal to her right upper arm.  Patient reports removal renal stone 2 weeks ago.  Patient reports drain to her right lower back.  Patient reports she was on IV antibiotics and now has completed the course of treatment and she would like her PICC line removed.  Patient denies any fever, chills, chest pain, shortness of breath, back pain, abdominal pain, nausea, vomiting, and all other concerns at this time.    The history is provided by the patient. No  was used.      PCP: Madie Horn MD     Past Medical History:  Past Medical History:   Diagnosis Date    Spina bifida     Spina bifida         Past Surgical History:  Past Surgical History:   Procedure Laterality Date    BLADDER AUGMENTATION      CSF SHUNT      EXTRACTION - STONE Right 4/6/2023    Procedure: EXTRACTION - STONE;  Surgeon: Merle Reed MD;  Location: [facility] OR;  Service: Urology;  Laterality: Right;    INSERTION OF SUPRAPUBIC CATHETER  1990    NEPHROSTOGRAPHY Right 4/6/2023    Procedure: NEPHROSTOGRAM;  Surgeon: Merle Reed MD;  Location: [facility] OR;  Service: Urology;  Laterality: Right;    PERCUTANEOUS NEPHROSTOLITHOTOMY Right 4/3/2023    Procedure: NEPHROSTOLITHOTOMY, PERCUTANEOUS;  Surgeon: Merle Reed MD;  Location: [facility] OR;  Service: Urology;  Laterality: Right;  PCNL    PERCUTANEOUS NEPHROSTOLITHOTOMY Right 4/6/2023    Procedure: NEPHROSTOLITHOTOMY, PERCUTANEOUS;  Surgeon: Merle Reed MD;  Location: [facility] OR;  Service: Urology;  Laterality: Right;    REPLACEMENT OF NEPHROSTOMY TUBE Right 4/6/2023    Procedure:  REPLACEMENT, NEPHROSTOMY TUBE;  Surgeon: Merle Reed MD;  Location: [facility] OR;  Service: Urology;  Laterality: Right;        Family History:  Family History   Problem Relation Age of Onset    Hyperlipidemia Mother     Hypertension Mother     Graves' disease Mother     Diabetes Father     Hyperlipidemia Father     Diabetes Paternal Grandmother     Stroke Paternal Grandmother         Social History:  Social History     Tobacco Use    Smoking status: Never    Smokeless tobacco: Never   Substance and Sexual Activity    Alcohol use: Yes     Comment: Occaisonally    Drug use: Never    Sexual activity: Never         ROS   Review of Systems   Constitutional:  Negative for fever.   HENT:  Negative for sore throat.    Respiratory:  Negative for shortness of breath.    Cardiovascular:  Negative for chest pain.   Gastrointestinal:  Negative for nausea.   Genitourinary:  Negative for dysuria.   Musculoskeletal:  Negative for back pain.   Skin:  Negative for rash.   Neurological:  Negative for weakness.   Hematological:  Does not bruise/bleed easily.     PHYSICAL EXAM     Initial Vitals [04/16/23 1307]   BP Pulse Resp Temp SpO2   (!) 184/78 97 16 98.2 °F (36.8 °C) 98 %      MAP       --           Physical Exam    Nursing note and vitals reviewed.  Constitutional: She appears well-developed and well-nourished. She is not diaphoretic. No distress.   HENT:   Head: Normocephalic and atraumatic.   Eyes: Right eye exhibits no discharge. Left eye exhibits no discharge.   Neck: Neck supple.   Normal range of motion.  Cardiovascular:  Normal rate.           Pulmonary/Chest: No respiratory distress.   Abdominal: Abdomen is soft. She exhibits no distension.   Musculoskeletal:         General: Normal range of motion.      Cervical back: Normal range of motion and neck supple.     Neurological: She is alert and oriented to person, place, and time. She has normal strength.   Skin: Skin is warm and dry.   PICC line noted to right upper  "extremity.  There is no surrounding erythema.  There is no drainage.  No signs of infection.  Dressing noted to right lower back with drain tube in place.  No signs of infection.  Area without drainage or erythema.   Psychiatric: She has a normal mood and affect. Her behavior is normal. Thought content normal.        ED COURSE   Procedures  ED ONGOING VITALS:  Vitals:    04/16/23 1307   BP: (!) 184/78   Pulse: 97   Resp: 16   Temp: 98.2 °F (36.8 °C)   TempSrc: Oral   SpO2: 98%   Weight: 78.4 kg (172 lb 11.7 oz)   Height: 4' 11" (1.499 m)         ABNORMAL LAB VALUES:  Labs Reviewed - No data to display      ALL LAB VALUES:  None      RADIOLOGY STUDIES:  Imaging Results    None                   The above vital signs and test results have been reviewed by the emergency provider.     ED Medications:  Discharge Medication List as of 4/16/2023  1:40 PM        Discharge Medications:  Discharge Medication List as of 4/16/2023  1:40 PM          Follow-up Information       pcp of choice.    Why: As needed             [facility] - Emergency Dept..    Specialty: Emergency Medicine  Why: If symptoms worsen  Contact information:  [address] [place] [address] [phone]                          I discussed with patient and/or family/caretaker that evaluation in the ED does not suggest any emergent or life threatening medical conditions requiring immediate intervention beyond what was provided in the ED, and I believe patient is safe for discharge. Regardless, an unremarkable evaluation in the ED does not preclude the development or presence of a serious or life threatening condition. As such, patient was instructed to return immediately for any worsening or change in current symptoms.        MEDICAL DECISION MAKING                 CLINICAL IMPRESSION       ICD-10-CM ICD-9-CM   1. PIC line (peripherally inserted central catheter) removal  Z45.2 V58.81       Disposition:   Disposition: " Discharged  Condition: Stable       Jacques Reed, [place]  04/16/23 7242

## 2023-04-17 ENCOUNTER — HOSPITAL ENCOUNTER (OUTPATIENT)
Dept: RADIOLOGY | Facility: HOSPITAL | Age: 38
Discharge: HOME | End: 2023-04-17
Attending: UROLOGY
Payer: COMMERCIAL

## 2023-04-17 ENCOUNTER — OFFICE VISIT (OUTPATIENT)
Dept: UROLOGY | Facility: CLINIC | Age: 38
End: 2023-04-17
Payer: COMMERCIAL

## 2023-04-17 VITALS
DIASTOLIC BLOOD PRESSURE: 75 MMHG | HEIGHT: 59 IN | HEART RATE: 113 BPM | SYSTOLIC BLOOD PRESSURE: 156 MMHG | TEMPERATURE: 98 F | RESPIRATION RATE: 18 BRPM | BODY MASS INDEX: 35.42 KG/M2 | WEIGHT: 175.69 LBS

## 2023-04-17 DIAGNOSIS — N20.0 NEPHROLITHIASIS: Primary | ICD-10-CM

## 2023-04-17 DIAGNOSIS — N39.0 URINARY TRACT INFECTION WITH HEMATURIA, SITE UNSPECIFIED: ICD-10-CM

## 2023-04-17 DIAGNOSIS — R31.9 URINARY TRACT INFECTION WITH HEMATURIA, SITE UNSPECIFIED: ICD-10-CM

## 2023-04-17 DIAGNOSIS — N39.0 RECURRENT UTI: ICD-10-CM

## 2023-04-17 DIAGNOSIS — N31.9 NEUROGENIC BLADDER: ICD-10-CM

## 2023-04-17 LAB
COMPN STONE: NORMAL
SPECIMEN SOURCE: NORMAL
STONE ANALYSIS IR-IMP: NORMAL

## 2023-04-17 PROCEDURE — 3077F SYST BP >= 140 MM HG: CPT | Mod: CPTII,S$GLB,, | Performed by: UROLOGY

## 2023-04-17 PROCEDURE — 3008F BODY MASS INDEX DOCD: CPT | Mod: CPTII,S$GLB,, | Performed by: UROLOGY

## 2023-04-17 PROCEDURE — 74178 CT UROGRAM ABD PELVIS W WO: ICD-10-PCS | Mod: 26,,, | Performed by: RADIOLOGY

## 2023-04-17 PROCEDURE — 3078F DIAST BP <80 MM HG: CPT | Mod: CPTII,S$GLB,, | Performed by: UROLOGY

## 2023-04-17 PROCEDURE — 99999 PR PBB SHADOW E&M-EST. PATIENT-LVL IV: CPT | Mod: PBBFAC,,, | Performed by: UROLOGY

## 2023-04-17 PROCEDURE — 3008F PR BODY MASS INDEX (BMI) DOCUMENTED: ICD-10-PCS | Mod: CPTII,S$GLB,, | Performed by: UROLOGY

## 2023-04-17 PROCEDURE — 74178 CT ABD&PLV WO CNTR FLWD CNTR: CPT | Mod: 26,,, | Performed by: RADIOLOGY

## 2023-04-17 PROCEDURE — 99024 POSTOP FOLLOW-UP VISIT: CPT | Mod: S$GLB,,, | Performed by: UROLOGY

## 2023-04-17 PROCEDURE — 99214 OFFICE O/P EST MOD 30 MIN: CPT | Mod: PBBFAC,25 | Performed by: UROLOGY

## 2023-04-17 PROCEDURE — 3078F PR MOST RECENT DIASTOLIC BLOOD PRESSURE < 80 MM HG: ICD-10-PCS | Mod: CPTII,S$GLB,, | Performed by: UROLOGY

## 2023-04-17 PROCEDURE — 25500020 PHARM REV CODE 255: Performed by: UROLOGY

## 2023-04-17 PROCEDURE — 99024 PR POST-OP FOLLOW-UP VISIT: ICD-10-PCS | Mod: S$GLB,,, | Performed by: UROLOGY

## 2023-04-17 PROCEDURE — 99999 PR PBB SHADOW E&M-EST. PATIENT-LVL IV: ICD-10-PCS | Mod: PBBFAC,,, | Performed by: UROLOGY

## 2023-04-17 PROCEDURE — 74178 CT ABD&PLV WO CNTR FLWD CNTR: CPT | Mod: TC

## 2023-04-17 PROCEDURE — 3077F PR MOST RECENT SYSTOLIC BLOOD PRESSURE >= 140 MM HG: ICD-10-PCS | Mod: CPTII,S$GLB,, | Performed by: UROLOGY

## 2023-04-17 RX ADMIN — IOHEXOL 125 ML: 350 INJECTION, SOLUTION INTRAVENOUS at 09:04

## 2023-04-17 NOTE — PROGRESS NOTES
"    Cc: kidney stones      History of Present Illness:     4/17/23- pt s/p PCNL 2 weeks ago. PCN tube in place. Pt s/p CT scan this am, which I have personally reviewed, showing one remaining non-obstructing mid-pole stone, which could not be accessed through existing tract. No contrast extravasation. Appropriate drainage of contrast through ureter and into bladder. Pt had PICC line removed in the ED yesterday. Pt reports minimal pain. No fever. Stone analysis mixed calcium composition.     3/7/23- Pt returns for management of her large staghorn calculus. She was hospitalized 12/2022 with pyelonephritis and bacteremia with very resistant ESBL E. coli. She had a repeat CT scan performed during that stay, which I have personally reviewed, now showing increased stone burden with perinephric stranding of her solitary kidney. She was never seen at [facility]. She denies recent flank pain or fever. No abdominal pain. She continues to perform CIC.     4/15/21- Pt is a 34yo female, here for evaluation of neurogenic bladder. She has a h/o spina bifida and neurogenic bladder, currently managed with bladder augment and continent catheterizable channel, done during early childhood. Caths Q4-5 hours. Hasn't seen a urologist in a while.   Thinks she has a UTI. Urine is "strong". No pain. No fever. No gross hematuria. Getting UTIs at least once a month. Symptoms typically include strong smelling urine and feelings of dehydration (dry mouth).   She was seeing Elana Edwards in the past, last a few years ago. Reports a cystoscopy was done. States that she was told her bladder was "tilted", and doesn't drain all the way. She was on oxybutynin for a year. Has been off for 5-6 years. Can't remember why she stopped or if she just ran out.   Sometimes has incontinence per urethra. Really only if she doesn't cath as often as she is supposed to.   Doesn't have a bowel management regimen.       Review of Systems   Constitutional:  Negative for " chills and fever.   HENT:  Negative for nosebleeds.    Respiratory:  Negative for shortness of breath.    Cardiovascular:  Negative for chest pain.   Gastrointestinal:  Positive for constipation. Negative for abdominal pain and blood in stool.   Musculoskeletal:  Negative for gait problem.   Neurological:  Negative for headaches.   Hematological:  Negative for adenopathy. Does not bruise/bleed easily.   All other systems reviewed and are negative.      Past Medical History:   Diagnosis Date    Spina bifida     Spina bifida        Past Surgical History:   Procedure Laterality Date    BLADDER AUGMENTATION      CSF SHUNT      EXTRACTION - STONE Right 4/6/2023    Procedure: EXTRACTION - STONE;  Surgeon: Merle Reed MD;  Location: [facility] OR;  Service: Urology;  Laterality: Right;    INSERTION OF SUPRAPUBIC CATHETER  1990    NEPHROSTOGRAPHY Right 4/6/2023    Procedure: NEPHROSTOGRAM;  Surgeon: Merle Reed MD;  Location: [facility] OR;  Service: Urology;  Laterality: Right;    PERCUTANEOUS NEPHROSTOLITHOTOMY Right 4/3/2023    Procedure: NEPHROSTOLITHOTOMY, PERCUTANEOUS;  Surgeon: Merle Reed MD;  Location: [facility] OR;  Service: Urology;  Laterality: Right;  PCNL    PERCUTANEOUS NEPHROSTOLITHOTOMY Right 4/6/2023    Procedure: NEPHROSTOLITHOTOMY, PERCUTANEOUS;  Surgeon: Merle Reed MD;  Location: [facility] OR;  Service: Urology;  Laterality: Right;    REPLACEMENT OF NEPHROSTOMY TUBE Right 4/6/2023    Procedure: REPLACEMENT, NEPHROSTOMY TUBE;  Surgeon: Merle Reed MD;  Location: [facility] OR;  Service: Urology;  Laterality: Right;       Family History   Problem Relation Age of Onset    Hyperlipidemia Mother     Hypertension Mother     Graves' disease Mother     Diabetes Father     Hyperlipidemia Father     Diabetes Paternal Grandmother     Stroke Paternal Grandmother        Social History     Tobacco Use    Smoking status: Never    Smokeless tobacco: Never   Substance Use Topics    Alcohol use: Yes     Comment:  Occaisonally    Drug use: Never       Current Outpatient Medications   Medication Sig Dispense Refill    ISIBLOOM 0.15-0.03 mg per tablet TAKE 1 TABLET DAILY FOR BIRTH CONTROL 84 tablet 3    ondansetron (ZOFRAN-ODT) 4 MG TbDL Take 1 tablet (4 mg total) by mouth every 8 (eight) hours as needed (nausea). 20 tablet 0    oxyCODONE-acetaminophen (PERCOCET)  mg per tablet Take 1 tablet by mouth every 6 (six) hours as needed for Pain. 20 tablet 0    sodium chloride 0.9 % PgBk 100 mL with ertapenem 1 gram SolR 1 g Inject 1 g into the vein once daily. 7 g 0    traZODone (DESYREL) 50 MG tablet Take 1 tablet (50 mg total) by mouth every evening. 30 tablet 11    triamcinolone acetonide 0.1% (KENALOG) 0.1 % ointment Apply topically 2 (two) times daily. 80 g 1    valsartan-hydrochlorothiazide (DIOVAN-HCT) 80-12.5 mg per tablet Take 1 tablet by mouth once daily. 90 tablet 3     No current facility-administered medications for this visit.       Review of patient's allergies indicates:   Allergen Reactions    Cefaclor     Cephalexin     Meperidine      vomiting    Promethazine     Red dye        Physical Exam  Vitals:    04/17/23 1118   BP: (!) 156/75   Pulse: (!) 113   Resp: 18   Temp: 97.8 °F (36.6 °C)       General: Well-developed, well-nourished, in no acute distress  HEENT: Normocephalic, atraumatic, extraocular movements intact  Neck: Supple, no supraclavicular or cervical lymphadenopathy, trachea midline  Respirations: even and unlabored  Back: midline spine, No CVA tenderness, PCN tube removed without difficulty.   Abdomen: soft, Non-tender, non-distended, no palpable masses, no rebound or guarding, continent catheterizable umbilical channel  Extremities: moves all equally, no clubbing, cyanosis or edema  Skin: Warm and dry. No lesions  Psych: normal affect  Neuro: Alert and oriented x 3. Cranial nerves II-XII intact          Assessment:  1. Nephrolithiasis  X-Ray Abdomen AP 1 View      2. Neurogenic bladder        3.  Recurrent UTI                  Plan:   Nephrolithiasis  -     X-Ray Abdomen AP 1 View; Future; Expected date: 07/17/2023    Neurogenic bladder    Recurrent UTI    24 hour urinalysis  Follow up in about 6 months (around 10/17/2023).

## 2023-04-18 ENCOUNTER — OFFICE VISIT (OUTPATIENT)
Dept: FAMILY MEDICINE | Facility: CLINIC | Age: 38
End: 2023-04-18
Payer: COMMERCIAL

## 2023-04-18 VITALS
HEART RATE: 81 BPM | HEIGHT: 59 IN | RESPIRATION RATE: 18 BRPM | TEMPERATURE: 99 F | DIASTOLIC BLOOD PRESSURE: 70 MMHG | WEIGHT: 171.5 LBS | SYSTOLIC BLOOD PRESSURE: 130 MMHG | OXYGEN SATURATION: 96 % | BODY MASS INDEX: 34.57 KG/M2

## 2023-04-18 DIAGNOSIS — N20.0 STAGHORN CALCULUS: ICD-10-CM

## 2023-04-18 DIAGNOSIS — Z09 HOSPITAL DISCHARGE FOLLOW-UP: Primary | ICD-10-CM

## 2023-04-18 DIAGNOSIS — I10 PRIMARY HYPERTENSION: ICD-10-CM

## 2023-04-18 PROCEDURE — 1160F PR REVIEW ALL MEDS BY PRESCRIBER/CLIN PHARMACIST DOCUMENTED: ICD-10-PCS | Mod: CPTII,S$GLB,, | Performed by: FAMILY MEDICINE

## 2023-04-18 PROCEDURE — 3008F PR BODY MASS INDEX (BMI) DOCUMENTED: ICD-10-PCS | Mod: CPTII,S$GLB,, | Performed by: FAMILY MEDICINE

## 2023-04-18 PROCEDURE — 1159F MED LIST DOCD IN RCRD: CPT | Mod: CPTII,S$GLB,, | Performed by: FAMILY MEDICINE

## 2023-04-18 PROCEDURE — 99214 OFFICE O/P EST MOD 30 MIN: CPT | Mod: S$GLB,,, | Performed by: FAMILY MEDICINE

## 2023-04-18 PROCEDURE — 99213 OFFICE O/P EST LOW 20 MIN: CPT | Mod: PBBFAC,PO | Performed by: FAMILY MEDICINE

## 2023-04-18 PROCEDURE — 3008F BODY MASS INDEX DOCD: CPT | Mod: CPTII,S$GLB,, | Performed by: FAMILY MEDICINE

## 2023-04-18 PROCEDURE — 99999 PR PBB SHADOW E&M-EST. PATIENT-LVL III: ICD-10-PCS | Mod: PBBFAC,,, | Performed by: FAMILY MEDICINE

## 2023-04-18 PROCEDURE — 1111F PR DISCHARGE MEDS RECONCILED W/ CURRENT OUTPATIENT MED LIST: ICD-10-PCS | Mod: CPTII,S$GLB,, | Performed by: FAMILY MEDICINE

## 2023-04-18 PROCEDURE — 99214 PR OFFICE/OUTPT VISIT, EST, LEVL IV, 30-39 MIN: ICD-10-PCS | Mod: S$GLB,,, | Performed by: FAMILY MEDICINE

## 2023-04-18 PROCEDURE — 1160F RVW MEDS BY RX/DR IN RCRD: CPT | Mod: CPTII,S$GLB,, | Performed by: FAMILY MEDICINE

## 2023-04-18 PROCEDURE — 1111F DSCHRG MED/CURRENT MED MERGE: CPT | Mod: CPTII,S$GLB,, | Performed by: FAMILY MEDICINE

## 2023-04-18 PROCEDURE — 3075F SYST BP GE 130 - 139MM HG: CPT | Mod: CPTII,S$GLB,, | Performed by: FAMILY MEDICINE

## 2023-04-18 PROCEDURE — 1159F PR MEDICATION LIST DOCUMENTED IN MEDICAL RECORD: ICD-10-PCS | Mod: CPTII,S$GLB,, | Performed by: FAMILY MEDICINE

## 2023-04-18 PROCEDURE — 3078F DIAST BP <80 MM HG: CPT | Mod: CPTII,S$GLB,, | Performed by: FAMILY MEDICINE

## 2023-04-18 PROCEDURE — 3075F PR MOST RECENT SYSTOLIC BLOOD PRESS GE 130-139MM HG: ICD-10-PCS | Mod: CPTII,S$GLB,, | Performed by: FAMILY MEDICINE

## 2023-04-18 PROCEDURE — 99999 PR PBB SHADOW E&M-EST. PATIENT-LVL III: CPT | Mod: PBBFAC,,, | Performed by: FAMILY MEDICINE

## 2023-04-18 PROCEDURE — 3078F PR MOST RECENT DIASTOLIC BLOOD PRESSURE < 80 MM HG: ICD-10-PCS | Mod: CPTII,S$GLB,, | Performed by: FAMILY MEDICINE

## 2023-04-18 RX ORDER — VALSARTAN AND HYDROCHLOROTHIAZIDE 80; 12.5 MG/1; MG/1
1 TABLET, FILM COATED ORAL DAILY
Qty: 90 TABLET | Refills: 3 | Status: SHIPPED | OUTPATIENT
Start: 2023-04-18 | End: 2024-03-09

## 2023-04-18 NOTE — PROGRESS NOTES
Chief Complaint:  No chief complaint on file.      History of Present Illness:    Patient presents today  Patient with solitary kidney at presented to the ED with UTI was found to have a staghorn calculus.    She was treated for UTI with IV antibiotics and was found to have Gram-positive cocci.  She had the stone removed.  She was subsequently discharged after finishing IV antibiotics had a PICC line which has been subsequently removed following with urology she will be having 24 hour urine studies     Says blood pressure been running high in the 160s to 180 sometimes.  She wants to be on a medication.    ROS:  Review of Systems   Constitutional:  Negative for activity change, chills, fatigue, fever and unexpected weight change.   HENT:  Negative for congestion, ear discharge, ear pain, hearing loss, postnasal drip and rhinorrhea.    Eyes:  Negative for pain and visual disturbance.   Respiratory:  Negative for cough, chest tightness and shortness of breath.    Cardiovascular:  Negative for chest pain and palpitations.   Gastrointestinal:  Negative for abdominal pain, diarrhea and vomiting.   Endocrine: Negative for heat intolerance.   Genitourinary:  Negative for dysuria, flank pain, frequency and hematuria.   Musculoskeletal:  Negative for back pain, gait problem and neck pain.   Skin:  Negative for color change and rash.   Neurological:  Negative for dizziness, tremors, seizures, numbness and headaches.   Psychiatric/Behavioral:  Negative for agitation, hallucinations, self-injury, sleep disturbance and suicidal ideas. The patient is not nervous/anxious.      Past Medical History:   Diagnosis Date    Spina bifida     Spina bifida        Social History:  Social History     Socioeconomic History    Marital status: Single   Tobacco Use    Smoking status: Never    Smokeless tobacco: Never   Substance and Sexual Activity    Alcohol use: Yes     Comment: Occaisonally    Drug use: Never    Sexual activity: Never  "      Family History:   family history includes Diabetes in her father and paternal grandmother; Graves' disease in her mother; Hyperlipidemia in her father and mother; Hypertension in her mother; Stroke in her paternal grandmother.    Health Maintenance   Topic Date Due    TETANUS VACCINE  05/18/2032    Hepatitis C Screening  Completed    Lipid Panel  Completed       Exam:Physical     Vital Signs  Temp: 98.9 °F (37.2 °C)  Temp Source: Tympanic  Pulse: 81  Resp: 18  SpO2: 96 %  BP: 130/70  BP Location: Left arm  Patient Position: Sitting  Pain Score: 0-No pain  Height and Weight  Height: 4' 11" (149.9 cm)  Weight: 77.8 kg (171 lb 8.3 oz)  BSA (Calculated - sq m): 1.8 sq meters  BMI (Calculated): 34.6  Weight in (lb) to have BMI = 25: 123.5]    Body mass index is 34.64 kg/m².    Physical Exam  Constitutional:       Appearance: She is well-developed.   Eyes:      Conjunctiva/sclera: Conjunctivae normal.      Pupils: Pupils are equal, round, and reactive to light.   Cardiovascular:      Rate and Rhythm: Normal rate and regular rhythm.      Heart sounds: Normal heart sounds. No murmur heard.  Pulmonary:      Effort: Pulmonary effort is normal. No respiratory distress.      Breath sounds: Normal breath sounds. No wheezing or rales.   Chest:      Chest wall: No tenderness.   Abdominal:      General: There is no distension.      Palpations: Abdomen is soft. There is no mass.      Tenderness: There is no abdominal tenderness. There is no guarding.   Musculoskeletal:         General: No tenderness.      Cervical back: Normal range of motion and neck supple.   Lymphadenopathy:      Cervical: No cervical adenopathy.   Skin:     General: Skin is warm and dry.   Neurological:      Mental Status: She is alert and oriented to person, place, and time.      Deep Tendon Reflexes: Reflexes are normal and symmetric.   Psychiatric:         Behavior: Behavior normal.         Thought Content: Thought content normal.         Judgment: " Judgment normal.         Assessment:      ICD-10-CM ICD-9-CM   1. Hospital discharge follow-up  Z09 V67.59   2. Staghorn calculus  N20.0 592.0   3. Primary hypertension  I10 401.9         Plan:    Staghorn calculus removal please follow-up urology stone analysis and 24 hour urine studies   Start on Diovan 80-12.5  p.o. daily monitor blood pressure carefully bring numbers and machine back        No follow-ups on file.      Madie Horn MD

## 2023-04-19 ENCOUNTER — PATIENT MESSAGE (OUTPATIENT)
Dept: UROLOGY | Facility: CLINIC | Age: 38
End: 2023-04-19
Payer: COMMERCIAL

## 2023-05-02 NOTE — PROGRESS NOTES
Preoperative History and Physical                                                                     Chief Complaint: Preoperative evaluation     History of Present Illness:      Addis Delvalle is a 37 y.o. female with a history of spina bifida, solitary right kidney, insomnia, multidrug resistance UTI's who presents to the office today for a preoperative consultation at the request of Dr. Reed who plans on performing PCNL on April 3.     Functional Status:      The patient is able to climb a flight of stairs. The patient is able to ambulate without difficulty. The patient's functional status is not affected by the surgical problem. The patient's functional status is not affected by shortness of breath, chest pain, dyspnea on exertion and fatigue.    MET score greater than 4    Past Medical History:      Past Medical History:   Diagnosis Date    Spina bifida     Spina bifida         Past Surgical History:      Past Surgical History:   Procedure Laterality Date    BLADDER AUGMENTATION      CSF SHUNT      INSERTION OF SUPRAPUBIC CATHETER  1990        Social History:      Social History     Socioeconomic History    Marital status: Single   Tobacco Use    Smoking status: Never    Smokeless tobacco: Never   Substance and Sexual Activity    Alcohol use: Yes     Comment: Occaisonally    Drug use: Never    Sexual activity: Never        Family History:      Family History   Problem Relation Age of Onset    Hyperlipidemia Mother     Hypertension Mother     Graves' disease Mother     Diabetes Father     Hyperlipidemia Father     Diabetes Paternal Grandmother     Stroke Paternal Grandmother        Allergies:      Review of patient's allergies indicates:   Allergen Reactions    Cefaclor     Cephalexin     Meperidine      vomiting    Promethazine     Red dye        Medications:      Current Outpatient Medications   Medication Sig    ISIBLOOM 0.15-0.03 mg per tablet TAKE 1  Consultation - [address] [facility]  [facility] 1959 61 [facility] [address] )    • Rheumatoid arthritis ([facility] [address] )    • Maximilian Noble MD;  " TABLET DAILY FOR BIRTH CONTROL    traZODone (DESYREL) 50 MG tablet Take 1 tablet (50 mg total) by mouth every evening.    triamcinolone acetonide 0.1% (KENALOG) 0.1 % ointment Apply topically 2 (two) times daily.     Current Facility-Administered Medications   Medication    meropenem (MERREM) 1 g in sodium chloride 0.9% 100 mL IVPB       Vitals:      Vitals:    03/30/23 0914   BP: 137/80   Pulse: 86   Resp: 16   Temp: 98.1 °F (36.7 °C)       Review of Systems:        Constitutional: Negative for fever, chills, weight loss, malaise/fatigue and diaphoresis.   HENT: Negative for hearing loss, ear pain, nosebleeds, congestion, sore throat, neck pain, tinnitus and ear discharge.    Eyes: Negative for blurred vision, double vision, photophobia, pain, discharge and redness.   Respiratory: Negative for cough, hemoptysis, sputum production, shortness of breath, wheezing and stridor.    Cardiovascular: Negative for chest pain, palpitations, orthopnea, claudication, leg swelling and PND.   Gastrointestinal: Negative for heartburn, nausea, vomiting, abdominal pain, diarrhea, constipation, blood in stool and melena.   Genitourinary: Negative for dysuria, urgency, frequency, hematuria and flank pain.   Musculoskeletal: Negative for myalgias, back pain, joint pain and falls.   Skin: Negative for itching and rash.   Neurological: Negative for dizziness, tingling, tremors, sensory change, speech change, focal weakness, seizures, loss of consciousness, weakness and headaches.   Endo/Heme/Allergies: Negative for environmental allergies and polydipsia. Does not bruise/bleed easily.   Psychiatric/Behavioral: Negative for depression, suicidal ideas, hallucinations, memory loss and substance abuse. The patient is not nervous/anxious and does not have insomnia.    All 14 systems reviewed and negative except as noted above.    Physical Exam:      Constitutional: Appears well-developed, well-nourished and in no acute distress.  Patient is  oriented to person, place, and time.   Head: Normocephalic and atraumatic. Mucous membranes moist.  Neck: Neck supple no mass.   Cardiovascular: Normal rate and regular rhythm.  S1 S2 appreciated by ascultation.  Pulmonary/Chest: Effort normal and clear to auscultation bilaterally. No respiratory distress.   Abdomen: Soft. Non-tender and non-distended. Bowel sounds are normal.   : self caths  Neurological: Patient is alert and oriented to person, place and time. Moves all extremities.  Skin: Warm and dry. No lesions.  Extremities: No clubbing, cyanosis or edema.    Laboratory data:      Reviewed and noted in plan where applicable. Please see chart for full laboratory data.    Lab Results   Component Value Date    WBC 7.13 03/30/2023    HGB 11.5 (L) 03/30/2023    HCT 38.0 03/30/2023    MCV 90 03/30/2023     03/30/2023       Sodium   Date Value Ref Range Status   03/30/2023 139 136 - 145 mmol/L Final     Chloride   Date Value Ref Range Status   03/30/2023 108 95 - 110 mmol/L Final     CO2   Date Value Ref Range Status   03/30/2023 22 (L) 23 - 29 mmol/L Final     Glucose   Date Value Ref Range Status   03/30/2023 117 (H) 70 - 110 mg/dL Final     BUN   Date Value Ref Range Status   03/30/2023 14 6 - 20 mg/dL Final     Creatinine   Date Value Ref Range Status   03/30/2023 0.9 0.5 - 1.4 mg/dL Final     eGFR   Date Value Ref Range Status   03/30/2023 >60.0 >60 mL/min/1.73 m^2 Final           Predictors of intubation difficulty:       Morbid obesity? no   Anatomically abnormal facies? no   Prominent incisors? no   Receding mandible? no   Short, thick neck? no   Neck range of motion: normal   Dentition: No chipped, loose, or missing teeth.  Based on the Modified Mallampati, patient is a mallampati score: I (soft palate, uvula, fauces, and tonsillar pillars visible)    Cardiographics:      ECG: Sinus Tachycardia   Echocardiogram: not indicated    Imaging:      Chest x-ray: not indicated    Assessment and Plan:       Pre-op evaluation  Known risk factors for perioperative complications: spina bifida,    Difficulty with intubation is not anticipated.    Cardiac Risk Estimation: Based on the Revised Cardiac Risk index, patient is a Class I risk with a 3.9 % risk of a major cardiac event.    1.) Preoperative workup as follows: ECG, hemoglobin, hematocrit, electrolytes, creatinine, glucose.  2.) Change in medication regimen before surgery: discontinue ASA 6 days before surgery, discontinue NSAIDs 5 days before surgery, hold Metformin 24 hours prior to surgery.  3.) Prophylaxis for cardiac events with perioperative beta-blockers: not indicated.  4.) Invasive hemodynamic monitoring perioperatively: at the discretion of anesthesiologist.  5.) Deep vein thrombosis prophylaxis postoperatively: regimen to be chosen by surgical team.  6.) Surveillance for postoperative MI with ECG immediately postoperatively and on postoperati ve days 1 and 2 AND troponin levels 24 hours postoperatively and on day 4 or hospital discharge (whichever comes first): not indicated.  7.) Current medications which may produce withdrawal symptoms if withheld perioperatively: none  8.) Other measures: Postoperative incentive spirometry to prevent pneumonia.    Spina bifida  - hx of spina bifida, hydrocephalus with  shut placed as a child, congenital solitary right kidney     Neurogenic bladder  Self caths     Staghorn calculus  - follows with Dr. Reed, planning on PCNL on 4/3    Chronic UTI  - hospitalized in Dec 2022 with pyelonephritis and bacteremia with very resistant ESBL E. Coli  - currently with RUE PICC line, ertapenem q 24 hours     Insomnia  - continue home trazadone        Electronically signed by Madai Edmondson MSN, FNP-C on 3/30/2023 at 10:46 AM.    "

## 2023-05-03 ENCOUNTER — PATIENT MESSAGE (OUTPATIENT)
Dept: UROLOGY | Facility: CLINIC | Age: 38
End: 2023-05-03
Payer: COMMERCIAL

## 2023-05-04 ENCOUNTER — PATIENT MESSAGE (OUTPATIENT)
Dept: FAMILY MEDICINE | Facility: CLINIC | Age: 38
End: 2023-05-04
Payer: COMMERCIAL

## 2023-05-04 RX ORDER — TRIAMCINOLONE ACETONIDE 1 MG/G
OINTMENT TOPICAL 2 TIMES DAILY
Qty: 80 G | Refills: 1 | Status: SHIPPED | OUTPATIENT
Start: 2023-05-04

## 2023-05-04 NOTE — TELEPHONE ENCOUNTER
No care due was identified.  [facility] Embedded Care Due Messages. Reference number: [id number].   5/04/2023 9:25:23 AM CDT

## 2023-05-05 RX ORDER — DESOGESTREL AND ETHINYL ESTRADIOL 0.15-0.03
KIT ORAL
Qty: 28 TABLET | Refills: 0 | Status: SHIPPED | OUTPATIENT
Start: 2023-05-05 | End: 2023-06-02 | Stop reason: SDUPTHER

## 2023-05-05 NOTE — TELEPHONE ENCOUNTER
Refill Routing Note   Medication(s) are not appropriate for processing by [facility] for the following reason(s):      Medication outside of protocol    [facility] action(s):  Route            Appointments  past 12m or future 3m with PCP    Date Provider   Last Visit   4/18/2023 Madie Horn MD   Next Visit   Visit date not found Madie Horn MD   ED visits in past 90 days: 1        Note composed:10:00 AM 05/05/2023

## 2023-05-16 ENCOUNTER — PATIENT MESSAGE (OUTPATIENT)
Dept: FAMILY MEDICINE | Facility: CLINIC | Age: 38
End: 2023-05-16
Payer: COMMERCIAL

## 2023-05-16 RX ORDER — KETOCONAZOLE 20 MG/G
CREAM TOPICAL DAILY
Qty: 60 G | Refills: 1 | Status: SHIPPED | OUTPATIENT
Start: 2023-05-16

## 2023-05-16 NOTE — TELEPHONE ENCOUNTER
Please see patient's mychart message and advise. Ketoconzole cream is not on patient's medcard to be pended.            Hey there I was wondering if I could get a new prescription of the ketoconzole cream I messaged a few weeks ago about it and they said they would send it but jus went to get it and they told me the script was . Really need it for my face thanks in advance.

## 2023-05-17 ENCOUNTER — PATIENT MESSAGE (OUTPATIENT)
Dept: FAMILY MEDICINE | Facility: CLINIC | Age: 38
End: 2023-05-17
Payer: COMMERCIAL

## 2023-06-02 DIAGNOSIS — Z01.419 WELL WOMAN EXAM WITH ROUTINE GYNECOLOGICAL EXAM: Primary | ICD-10-CM

## 2023-06-02 RX ORDER — DESOGESTREL AND ETHINYL ESTRADIOL 0.15-0.03
1 KIT ORAL DAILY
Qty: 28 TABLET | Refills: 6 | Status: SHIPPED | OUTPATIENT
Start: 2023-06-02 | End: 2023-11-17

## 2023-06-02 NOTE — TELEPHONE ENCOUNTER
No care due was identified.  [facility] Embedded Care Due Messages. Reference number: [id number].   6/02/2023 4:08:09 PM CDT

## 2023-07-03 PROBLEM — N39.0 CHRONIC UTI: Status: RESOLVED | Noted: 2020-01-11 | Resolved: 2023-07-03

## 2023-07-10 ENCOUNTER — HOSPITAL ENCOUNTER (OUTPATIENT)
Dept: RADIOLOGY | Facility: HOSPITAL | Age: 38
Discharge: HOME | End: 2023-07-10
Attending: UROLOGY
Payer: COMMERCIAL

## 2023-07-10 DIAGNOSIS — N20.0 NEPHROLITHIASIS: ICD-10-CM

## 2023-07-10 PROCEDURE — 74018 XR ABDOMEN AP 1 VIEW: ICD-10-PCS | Mod: 26,,, | Performed by: RADIOLOGY

## 2023-07-10 PROCEDURE — 74018 RADEX ABDOMEN 1 VIEW: CPT | Mod: TC

## 2023-07-10 PROCEDURE — 74018 RADEX ABDOMEN 1 VIEW: CPT | Mod: 26,,, | Performed by: RADIOLOGY

## 2023-07-17 ENCOUNTER — OFFICE VISIT (OUTPATIENT)
Dept: UROLOGY | Facility: CLINIC | Age: 38
End: 2023-07-17
Payer: COMMERCIAL

## 2023-07-17 VITALS
SYSTOLIC BLOOD PRESSURE: 138 MMHG | DIASTOLIC BLOOD PRESSURE: 83 MMHG | HEIGHT: 59 IN | HEART RATE: 109 BPM | BODY MASS INDEX: 35.91 KG/M2 | WEIGHT: 178.13 LBS

## 2023-07-17 DIAGNOSIS — Q60.0 CONGENITALLY SOLITARY RIGHT KIDNEY: ICD-10-CM

## 2023-07-17 DIAGNOSIS — N20.0 RENAL STONE: Primary | ICD-10-CM

## 2023-07-17 DIAGNOSIS — N31.9 NEUROGENIC BLADDER: ICD-10-CM

## 2023-07-17 PROCEDURE — 3008F PR BODY MASS INDEX (BMI) DOCUMENTED: ICD-10-PCS | Mod: CPTII,S$GLB,, | Performed by: UROLOGY

## 2023-07-17 PROCEDURE — 99999 PR PBB SHADOW E&M-EST. PATIENT-LVL III: ICD-10-PCS | Mod: PBBFAC,,, | Performed by: UROLOGY

## 2023-07-17 PROCEDURE — 3075F SYST BP GE 130 - 139MM HG: CPT | Mod: CPTII,S$GLB,, | Performed by: UROLOGY

## 2023-07-17 PROCEDURE — 99213 OFFICE O/P EST LOW 20 MIN: CPT | Mod: PBBFAC | Performed by: UROLOGY

## 2023-07-17 PROCEDURE — 99214 OFFICE O/P EST MOD 30 MIN: CPT | Mod: S$GLB,,, | Performed by: UROLOGY

## 2023-07-17 PROCEDURE — 99214 PR OFFICE/OUTPT VISIT, EST, LEVL IV, 30-39 MIN: ICD-10-PCS | Mod: S$GLB,,, | Performed by: UROLOGY

## 2023-07-17 PROCEDURE — 3079F PR MOST RECENT DIASTOLIC BLOOD PRESSURE 80-89 MM HG: ICD-10-PCS | Mod: CPTII,S$GLB,, | Performed by: UROLOGY

## 2023-07-17 PROCEDURE — 3008F BODY MASS INDEX DOCD: CPT | Mod: CPTII,S$GLB,, | Performed by: UROLOGY

## 2023-07-17 PROCEDURE — 1159F MED LIST DOCD IN RCRD: CPT | Mod: CPTII,S$GLB,, | Performed by: UROLOGY

## 2023-07-17 PROCEDURE — 3079F DIAST BP 80-89 MM HG: CPT | Mod: CPTII,S$GLB,, | Performed by: UROLOGY

## 2023-07-17 PROCEDURE — 1159F PR MEDICATION LIST DOCUMENTED IN MEDICAL RECORD: ICD-10-PCS | Mod: CPTII,S$GLB,, | Performed by: UROLOGY

## 2023-07-17 PROCEDURE — 3075F PR MOST RECENT SYSTOLIC BLOOD PRESS GE 130-139MM HG: ICD-10-PCS | Mod: CPTII,S$GLB,, | Performed by: UROLOGY

## 2023-07-17 PROCEDURE — 99999 PR PBB SHADOW E&M-EST. PATIENT-LVL III: CPT | Mod: PBBFAC,,, | Performed by: UROLOGY

## 2023-07-17 NOTE — PROGRESS NOTES
"    Cc: kidney stones      History of Present Illness:     7/17/23-KUB personally reviewed, noting lower pole (likely extrarenal) calcification and a 1.3cm remaining mid-pole stone. No gross hematuria. Urine is cloudy. 24 hour urinalysis wasn't ever sent to pt. No fever or dysuria. Cathing 5 times a day.     4/17/23- pt s/p PCNL 2 weeks ago. PCN tube in place. Pt s/p CT scan this am, which I have personally reviewed, showing one remaining non-obstructing mid-pole stone, which could not be accessed through existing tract. No contrast extravasation. Appropriate drainage of contrast through ureter and into bladder. Pt had PICC line removed in the ED yesterday. Pt reports minimal pain. No fever. Stone analysis mixed calcium composition.     3/7/23- Pt returns for management of her large staghorn calculus. She was hospitalized 12/2022 with pyelonephritis and bacteremia with very resistant ESBL E. coli. She had a repeat CT scan performed during that stay, which I have personally reviewed, now showing increased stone burden with perinephric stranding of her solitary kidney. She was never seen at [facility]. She denies recent flank pain or fever. No abdominal pain. She continues to perform CIC.     4/15/21- Pt is a 36yo female, here for evaluation of neurogenic bladder. She has a h/o spina bifida and neurogenic bladder, currently managed with bladder augment and continent catheterizable channel, done during early childhood. Caths Q4-5 hours. Hasn't seen a urologist in a while.   Thinks she has a UTI. Urine is "strong". No pain. No fever. No gross hematuria. Getting UTIs at least once a month. Symptoms typically include strong smelling urine and feelings of dehydration (dry mouth).   She was seeing Elana Edwards in the past, last a few years ago. Reports a cystoscopy was done. States that she was told her bladder was "tilted", and doesn't drain all the way. She was on oxybutynin for a year. Has been off for 5-6 years. Can't remember " why she stopped or if she just ran out.   Sometimes has incontinence per urethra. Really only if she doesn't cath as often as she is supposed to.   Doesn't have a bowel management regimen.       Review of Systems   Constitutional:  Negative for chills and fever.   HENT:  Negative for nosebleeds.    Respiratory:  Negative for shortness of breath.    Cardiovascular:  Negative for chest pain.   Gastrointestinal:  Positive for constipation. Negative for abdominal pain and blood in stool.   Musculoskeletal:  Negative for gait problem.   Neurological:  Negative for headaches.   Hematological:  Negative for adenopathy. Does not bruise/bleed easily.   All other systems reviewed and are negative.      Past Medical History:   Diagnosis Date    Spina bifida     Spina bifida        Past Surgical History:   Procedure Laterality Date    BLADDER AUGMENTATION      CSF SHUNT      EXTRACTION - STONE Right 4/6/2023    Procedure: EXTRACTION - STONE;  Surgeon: Merle Reed MD;  Location: [facility] OR;  Service: Urology;  Laterality: Right;    INSERTION OF SUPRAPUBIC CATHETER  1990    NEPHROSTOGRAPHY Right 4/6/2023    Procedure: NEPHROSTOGRAM;  Surgeon: Merle Reed MD;  Location: [facility] OR;  Service: Urology;  Laterality: Right;    PERCUTANEOUS NEPHROSTOLITHOTOMY Right 4/3/2023    Procedure: NEPHROSTOLITHOTOMY, PERCUTANEOUS;  Surgeon: Merle Reed MD;  Location: [facility] OR;  Service: Urology;  Laterality: Right;  PCNL    PERCUTANEOUS NEPHROSTOLITHOTOMY Right 4/6/2023    Procedure: NEPHROSTOLITHOTOMY, PERCUTANEOUS;  Surgeon: Merle Reed MD;  Location: [facility] OR;  Service: Urology;  Laterality: Right;    REPLACEMENT OF NEPHROSTOMY TUBE Right 4/6/2023    Procedure: REPLACEMENT, NEPHROSTOMY TUBE;  Surgeon: Merle Reed MD;  Location: [facility] OR;  Service: Urology;  Laterality: Right;       Family History   Problem Relation Age of Onset    Hyperlipidemia Mother     Hypertension Mother     Graves' disease Mother     Diabetes  Father     Hyperlipidemia Father     Diabetes Paternal Grandmother     Stroke Paternal Grandmother        Social History     Tobacco Use    Smoking status: Never    Smokeless tobacco: Never   Substance Use Topics    Alcohol use: Yes     Comment: Occaisonally    Drug use: Never       Current Outpatient Medications   Medication Sig Dispense Refill    desogestreL-ethinyl estradioL (ISIBLOOM) 0.15-0.03 mg per tablet Take 1 tablet by mouth once daily. 28 tablet 6    ketoconazole (NIZORAL) 2 % cream Apply topically once daily. 60 g 1    ondansetron (ZOFRAN-ODT) 4 MG TbDL Take 1 tablet (4 mg total) by mouth every 8 (eight) hours as needed (nausea). 20 tablet 0    oxyCODONE-acetaminophen (PERCOCET)  mg per tablet Take 1 tablet by mouth every 6 (six) hours as needed for Pain. 20 tablet 0    sodium chloride 0.9 % PgBk 100 mL with ertapenem 1 gram SolR 1 g Inject 1 g into the vein once daily. 7 g 0    traZODone (DESYREL) 50 MG tablet Take 1 tablet (50 mg total) by mouth every evening. 30 tablet 11    triamcinolone acetonide 0.1% (KENALOG) 0.1 % ointment Apply topically 2 (two) times daily. 80 g 1    valsartan-hydrochlorothiazide (DIOVAN-HCT) 80-12.5 mg per tablet Take 1 tablet by mouth once daily. 90 tablet 3     No current facility-administered medications for this visit.       Review of patient's allergies indicates:   Allergen Reactions    Cefaclor     Cephalexin     Meperidine      vomiting    Promethazine     Red dye        Physical Exam  Vitals:    07/17/23 1053   BP: 138/83   Pulse: 109         General: Well-developed, well-nourished, in no acute distress  HEENT: Normocephalic, atraumatic, extraocular movements intact  Neck: Supple, no supraclavicular or cervical lymphadenopathy, trachea midline  Respirations: even and unlabored  Back: midline spine, No CVA tenderness  Abdomen: soft, Non-tender, non-distended, no palpable masses, no rebound or guarding, continent catheterizable umbilical channel  Extremities:  moves all equally, no clubbing, cyanosis or edema  Skin: Warm and dry. No lesions  Psych: normal affect  Neuro: Alert and oriented x 3. Cranial nerves II-XII intact      UA: moderate blood, large Lalo    Assessment:  1. Renal stone  US Kidney    US Retroperitoneal Complete      2. Congenitally solitary right kidney        3. Neurogenic bladder                    Plan:   Renal stone  -     US Kidney; Future; Expected date: 07/17/2023  -     US Retroperitoneal Complete; Future; Expected date: 10/17/2023    Congenitally solitary right kidney    Neurogenic bladder      Will re-order 24 hour urinalysis  Discussed with pt that she is likely colonized related to CIC, and we will not culture or treat unless she becomes symptomatic    Follow up in about 3 months (around 10/17/2023).

## 2023-07-28 ENCOUNTER — HOSPITAL ENCOUNTER (OUTPATIENT)
Dept: RADIOLOGY | Facility: HOSPITAL | Age: 38
Discharge: HOME | End: 2023-07-28
Attending: UROLOGY
Payer: COMMERCIAL

## 2023-07-28 DIAGNOSIS — N20.0 RENAL STONE: ICD-10-CM

## 2023-07-28 PROCEDURE — 76770 US EXAM ABDO BACK WALL COMP: CPT | Mod: 26,,, | Performed by: RADIOLOGY

## 2023-07-28 PROCEDURE — 76770 US EXAM ABDO BACK WALL COMP: CPT | Mod: TC

## 2023-07-28 PROCEDURE — 76770 US RETROPERITONEAL COMPLETE: ICD-10-PCS | Mod: 26,,, | Performed by: RADIOLOGY

## 2023-08-30 NOTE — TELEPHONE ENCOUNTER
LVM, pt to return call No care due was identified.  [facility] Embedded Care Due Messages. Reference number: [id number].   5/05/2023 9:27:44 AM CDT

## 2023-11-07 ENCOUNTER — PATIENT MESSAGE (OUTPATIENT)
Dept: FAMILY MEDICINE | Facility: CLINIC | Age: 38
End: 2023-11-07
Payer: COMMERCIAL

## 2023-11-17 DIAGNOSIS — Z01.419 WELL WOMAN EXAM WITH ROUTINE GYNECOLOGICAL EXAM: ICD-10-CM

## 2023-11-17 RX ORDER — TRAZODONE HYDROCHLORIDE 50 MG/1
TABLET ORAL
Qty: 90 TABLET | Refills: 1 | Status: SHIPPED | OUTPATIENT
Start: 2023-11-17

## 2023-11-17 RX ORDER — DESOGESTREL AND ETHINYL ESTRADIOL 0.15-0.03
KIT ORAL
Qty: 84 TABLET | Refills: 1 | Status: SHIPPED | OUTPATIENT
Start: 2023-11-17

## 2023-11-17 NOTE — TELEPHONE ENCOUNTER
No care due was identified.  [facility] Embedded Care Due Messages. Reference number: [id number].   11/17/2023 2:45:05 PM CST

## 2023-11-17 NOTE — TELEPHONE ENCOUNTER
Refill Decision Note   Addis Delvalle  is requesting a refill authorization.  Brief Assessment and Rationale for Refill:  Approve     Medication Therapy Plan:         Comments:     Note composed:3:46 PM 11/17/2023

## 2023-12-12 ENCOUNTER — APPOINTMENT (OUTPATIENT)
Dept: RADIOLOGY | Facility: HOSPITAL | Age: 38
End: 2023-12-12
Attending: UROLOGY
Payer: COMMERCIAL

## 2023-12-12 DIAGNOSIS — N20.0 RENAL STONE: ICD-10-CM

## 2023-12-12 PROCEDURE — 76770 US EXAM ABDO BACK WALL COMP: CPT | Mod: TC,PO

## 2023-12-12 PROCEDURE — 76770 US RETROPERITONEAL COMPLETE: ICD-10-PCS | Mod: 26,,, | Performed by: STUDENT IN AN ORGANIZED HEALTH CARE EDUCATION/TRAINING PROGRAM

## 2023-12-12 PROCEDURE — 76770 US EXAM ABDO BACK WALL COMP: CPT | Mod: 26,,, | Performed by: STUDENT IN AN ORGANIZED HEALTH CARE EDUCATION/TRAINING PROGRAM

## 2024-02-21 ENCOUNTER — TELEPHONE (OUTPATIENT)
Dept: PHARMACY | Facility: CLINIC | Age: 39
End: 2024-02-21
Payer: COMMERCIAL

## 2024-02-27 ENCOUNTER — PATIENT MESSAGE (OUTPATIENT)
Dept: FAMILY MEDICINE | Facility: CLINIC | Age: 39
End: 2024-02-27
Payer: COMMERCIAL

## 2024-02-29 ENCOUNTER — LAB VISIT (OUTPATIENT)
Dept: LAB | Facility: HOSPITAL | Age: 39
End: 2024-02-29
Attending: FAMILY MEDICINE
Payer: COMMERCIAL

## 2024-02-29 ENCOUNTER — TELEPHONE (OUTPATIENT)
Dept: FAMILY MEDICINE | Facility: CLINIC | Age: 39
End: 2024-02-29
Payer: COMMERCIAL

## 2024-02-29 ENCOUNTER — PATIENT MESSAGE (OUTPATIENT)
Dept: FAMILY MEDICINE | Facility: CLINIC | Age: 39
End: 2024-02-29
Payer: COMMERCIAL

## 2024-02-29 DIAGNOSIS — R74.8 ELEVATED LIVER ENZYMES: ICD-10-CM

## 2024-02-29 DIAGNOSIS — Z00.00 WELL ADULT EXAM: ICD-10-CM

## 2024-02-29 DIAGNOSIS — I10 PRIMARY HYPERTENSION: Primary | ICD-10-CM

## 2024-02-29 DIAGNOSIS — I10 PRIMARY HYPERTENSION: ICD-10-CM

## 2024-02-29 LAB
ALBUMIN SERPL BCP-MCNC: 3.4 G/DL (ref 3.5–5.2)
ALP SERPL-CCNC: 77 U/L (ref 55–135)
ALT SERPL W/O P-5'-P-CCNC: 14 U/L (ref 10–44)
ANION GAP SERPL CALC-SCNC: 12 MMOL/L (ref 8–16)
AST SERPL-CCNC: 15 U/L (ref 10–40)
BASOPHILS # BLD AUTO: 0.05 K/UL (ref 0–0.2)
BASOPHILS NFR BLD: 0.5 % (ref 0–1.9)
BILIRUB SERPL-MCNC: 0.3 MG/DL (ref 0.1–1)
BUN SERPL-MCNC: 15 MG/DL (ref 6–20)
CALCIUM SERPL-MCNC: 9.4 MG/DL (ref 8.7–10.5)
CHLORIDE SERPL-SCNC: 107 MMOL/L (ref 95–110)
CHOLEST SERPL-MCNC: 198 MG/DL (ref 120–199)
CHOLEST/HDLC SERPL: 3.2 {RATIO} (ref 2–5)
CO2 SERPL-SCNC: 21 MMOL/L (ref 23–29)
CREAT SERPL-MCNC: 1 MG/DL (ref 0.5–1.4)
DIFFERENTIAL METHOD BLD: ABNORMAL
EOSINOPHIL # BLD AUTO: 0.2 K/UL (ref 0–0.5)
EOSINOPHIL NFR BLD: 1.6 % (ref 0–8)
ERYTHROCYTE [DISTWIDTH] IN BLOOD BY AUTOMATED COUNT: 15 % (ref 11.5–14.5)
EST. GFR  (NO RACE VARIABLE): >60 ML/MIN/1.73 M^2
ESTIMATED AVG GLUCOSE: 131 MG/DL (ref 68–131)
GLUCOSE SERPL-MCNC: 151 MG/DL (ref 70–110)
HBA1C MFR BLD: 6.2 % (ref 4–5.6)
HCT VFR BLD AUTO: 33.7 % (ref 37–48.5)
HDLC SERPL-MCNC: 62 MG/DL (ref 40–75)
HDLC SERPL: 31.3 % (ref 20–50)
HGB BLD-MCNC: 10.3 G/DL (ref 12–16)
IMM GRANULOCYTES # BLD AUTO: 0.03 K/UL (ref 0–0.04)
IMM GRANULOCYTES NFR BLD AUTO: 0.3 % (ref 0–0.5)
LDLC SERPL CALC-MCNC: 109.6 MG/DL (ref 63–159)
LYMPHOCYTES # BLD AUTO: 1.6 K/UL (ref 1–4.8)
LYMPHOCYTES NFR BLD: 16.1 % (ref 18–48)
MCH RBC QN AUTO: 26.8 PG (ref 27–31)
MCHC RBC AUTO-ENTMCNC: 30.6 G/DL (ref 32–36)
MCV RBC AUTO: 88 FL (ref 82–98)
MONOCYTES # BLD AUTO: 0.8 K/UL (ref 0.3–1)
MONOCYTES NFR BLD: 7.8 % (ref 4–15)
NEUTROPHILS # BLD AUTO: 7.1 K/UL (ref 1.8–7.7)
NEUTROPHILS NFR BLD: 73.7 % (ref 38–73)
NONHDLC SERPL-MCNC: 136 MG/DL
NRBC BLD-RTO: 0 /100 WBC
PLATELET # BLD AUTO: 326 K/UL (ref 150–450)
PMV BLD AUTO: 10.5 FL (ref 9.2–12.9)
POTASSIUM SERPL-SCNC: 3.9 MMOL/L (ref 3.5–5.1)
PROT SERPL-MCNC: 7.6 G/DL (ref 6–8.4)
RBC # BLD AUTO: 3.85 M/UL (ref 4–5.4)
SODIUM SERPL-SCNC: 140 MMOL/L (ref 136–145)
TRIGL SERPL-MCNC: 132 MG/DL (ref 30–150)
TSH SERPL DL<=0.005 MIU/L-ACNC: 0.93 UIU/ML (ref 0.4–4)
WBC # BLD AUTO: 9.65 K/UL (ref 3.9–12.7)

## 2024-02-29 PROCEDURE — 83036 HEMOGLOBIN GLYCOSYLATED A1C: CPT | Performed by: FAMILY MEDICINE

## 2024-02-29 PROCEDURE — 84443 ASSAY THYROID STIM HORMONE: CPT | Performed by: FAMILY MEDICINE

## 2024-02-29 PROCEDURE — 85025 COMPLETE CBC W/AUTO DIFF WBC: CPT | Performed by: FAMILY MEDICINE

## 2024-02-29 PROCEDURE — 36415 COLL VENOUS BLD VENIPUNCTURE: CPT | Mod: PO | Performed by: FAMILY MEDICINE

## 2024-02-29 PROCEDURE — 80053 COMPREHEN METABOLIC PANEL: CPT | Performed by: FAMILY MEDICINE

## 2024-02-29 PROCEDURE — 80061 LIPID PANEL: CPT | Performed by: FAMILY MEDICINE

## 2024-03-07 ENCOUNTER — LAB VISIT (OUTPATIENT)
Dept: LAB | Facility: HOSPITAL | Age: 39
End: 2024-03-07
Attending: FAMILY MEDICINE
Payer: COMMERCIAL

## 2024-03-07 ENCOUNTER — OFFICE VISIT (OUTPATIENT)
Dept: FAMILY MEDICINE | Facility: CLINIC | Age: 39
End: 2024-03-07
Payer: COMMERCIAL

## 2024-03-07 VITALS
OXYGEN SATURATION: 95 % | HEART RATE: 101 BPM | WEIGHT: 182.56 LBS | DIASTOLIC BLOOD PRESSURE: 80 MMHG | BODY MASS INDEX: 36.87 KG/M2 | SYSTOLIC BLOOD PRESSURE: 136 MMHG

## 2024-03-07 DIAGNOSIS — D64.9 CHRONIC ANEMIA: Primary | ICD-10-CM

## 2024-03-07 DIAGNOSIS — N31.9 NEUROGENIC BLADDER: ICD-10-CM

## 2024-03-07 DIAGNOSIS — D64.9 CHRONIC ANEMIA: ICD-10-CM

## 2024-03-07 DIAGNOSIS — Q60.0 CONGENITALLY SOLITARY RIGHT KIDNEY: ICD-10-CM

## 2024-03-07 DIAGNOSIS — R73.03 PREDIABETES: ICD-10-CM

## 2024-03-07 DIAGNOSIS — F51.04 CHRONIC INSOMNIA: ICD-10-CM

## 2024-03-07 LAB
FERRITIN SERPL-MCNC: 31 NG/ML (ref 20–300)
FOLATE SERPL-MCNC: 3.5 NG/ML (ref 4–24)
IRON SERPL-MCNC: 31 UG/DL (ref 30–160)
IRON SERPL-MCNC: 31 UG/DL (ref 30–160)
LDH SERPL L TO P-CCNC: 214 U/L (ref 110–260)
RETICS/RBC NFR AUTO: 1.9 % (ref 0.5–2.5)
SATURATED IRON: 6 % (ref 20–50)
TOTAL IRON BINDING CAPACITY: 485 UG/DL (ref 250–450)
TRANSFERRIN SERPL-MCNC: 328 MG/DL (ref 200–375)
TRANSFERRIN SERPL-MCNC: 328 MG/DL (ref 200–375)
VIT B12 SERPL-MCNC: 353 PG/ML (ref 210–950)

## 2024-03-07 PROCEDURE — 3079F DIAST BP 80-89 MM HG: CPT | Mod: CPTII,S$GLB,, | Performed by: FAMILY MEDICINE

## 2024-03-07 PROCEDURE — 99999 PR PBB SHADOW E&M-EST. PATIENT-LVL III: CPT | Mod: PBBFAC,,, | Performed by: FAMILY MEDICINE

## 2024-03-07 PROCEDURE — 3075F SYST BP GE 130 - 139MM HG: CPT | Mod: CPTII,S$GLB,, | Performed by: FAMILY MEDICINE

## 2024-03-07 PROCEDURE — 83540 ASSAY OF IRON: CPT | Performed by: FAMILY MEDICINE

## 2024-03-07 PROCEDURE — 85045 AUTOMATED RETICULOCYTE COUNT: CPT | Performed by: FAMILY MEDICINE

## 2024-03-07 PROCEDURE — 36415 COLL VENOUS BLD VENIPUNCTURE: CPT | Mod: PO | Performed by: FAMILY MEDICINE

## 2024-03-07 PROCEDURE — 82728 ASSAY OF FERRITIN: CPT | Performed by: FAMILY MEDICINE

## 2024-03-07 PROCEDURE — 82746 ASSAY OF FOLIC ACID SERUM: CPT | Performed by: FAMILY MEDICINE

## 2024-03-07 PROCEDURE — 83615 LACTATE (LD) (LDH) ENZYME: CPT | Performed by: FAMILY MEDICINE

## 2024-03-07 PROCEDURE — 3008F BODY MASS INDEX DOCD: CPT | Mod: CPTII,S$GLB,, | Performed by: FAMILY MEDICINE

## 2024-03-07 PROCEDURE — 1159F MED LIST DOCD IN RCRD: CPT | Mod: CPTII,S$GLB,, | Performed by: FAMILY MEDICINE

## 2024-03-07 PROCEDURE — 3044F HG A1C LEVEL LT 7.0%: CPT | Mod: CPTII,S$GLB,, | Performed by: FAMILY MEDICINE

## 2024-03-07 PROCEDURE — 99214 OFFICE O/P EST MOD 30 MIN: CPT | Mod: S$GLB,,, | Performed by: FAMILY MEDICINE

## 2024-03-07 PROCEDURE — 82525 ASSAY OF COPPER: CPT | Performed by: FAMILY MEDICINE

## 2024-03-07 PROCEDURE — 82607 VITAMIN B-12: CPT | Performed by: FAMILY MEDICINE

## 2024-03-07 RX ORDER — ESZOPICLONE 2 MG/1
2 TABLET, FILM COATED ORAL NIGHTLY
Qty: 30 TABLET | Refills: 3 | Status: SHIPPED | OUTPATIENT
Start: 2024-03-07 | End: 2024-04-06

## 2024-03-07 NOTE — PROGRESS NOTES
Chief Complaint:    Chief Complaint   Patient presents with    Follow-up     Discuss blood work       History of Present Illness:  Patient with neurogenic bladder presents today for annual,     A1C 6.2, lipid chemistries stable, liver/kidney function stable.TSH good  Chronic anemia, says periods are regular and heavy. Her anemia has not had any further workup. Was sent to hematology but never followed up    Is requesting new insomnia medication, was on Trazodone for a year but has stopped helping.     Patient has underlying neurogenic bladder secondary to spina bifida and does intermittent catheterization. Still doing catheterization, not on preventative antibiotic anymore. Has kidney stones, probably causing infection. Was supposed have surgery but it was never scheduled due to COVID. Following with Dr. Reed. Her left kidney is atrophied. Her right kidney has hydronephrosis.          ROS:  Review of Systems   Constitutional:  Negative for appetite change, chills and fever.   HENT:  Negative for congestion, ear pain, postnasal drip, rhinorrhea, sinus pressure and sinus pain.    Eyes:  Negative for pain.   Respiratory:  Negative for cough, chest tightness and shortness of breath.    Cardiovascular:  Negative for chest pain and palpitations.   Gastrointestinal:  Negative for abdominal pain, blood in stool, constipation, diarrhea and nausea.   Genitourinary:  Negative for difficulty urinating, dysuria, flank pain and hematuria.   Musculoskeletal:  Negative for arthralgias and myalgias.   Skin:  Negative for pallor and wound.   Neurological:  Negative for dizziness, tremors, speech difficulty, light-headedness and headaches.   Psychiatric/Behavioral:  Negative for behavioral problems, dysphoric mood and sleep disturbance. The patient is not nervous/anxious.    All other systems reviewed and are negative.      Past Medical History:   Diagnosis Date    Spina bifida     Spina bifida        Social History:  Social  History     Socioeconomic History    Marital status: Single   Tobacco Use    Smoking status: Never    Smokeless tobacco: Never   Substance and Sexual Activity    Alcohol use: Yes     Comment: Occaisonally    Drug use: Never    Sexual activity: Never     Social Determinants of Health     Financial Resource Strain: Low Risk  (2/29/2024)    Overall Financial Resource Strain (CARDIA)     Difficulty of Paying Living Expenses: Not very hard   Food Insecurity: No Food Insecurity (2/29/2024)    Hunger Vital Sign     Worried About Running Out of Food in the Last Year: Never true     Ran Out of Food in the Last Year: Never true   Transportation Needs: No Transportation Needs (2/29/2024)    PRAPARE - Transportation     Lack of Transportation (Medical): No     Lack of Transportation (Non-Medical): No   Physical Activity: Sufficiently Active (2/29/2024)    Exercise Vital Sign     Days of Exercise per Week: 2 days     Minutes of Exercise per Session: 150+ min   Stress: Stress Concern Present (2/29/2024)    [employer] - Occupational Stress Questionnaire     Feeling of Stress : To some extent   Social Connections: Unknown (2/29/2024)    Social Connection and Isolation Panel [NHANES]     Frequency of Communication with Friends and Family: Once a week     Frequency of Social Gatherings with Friends and Family: Never     Active Member of Clubs or Organizations: No     Attends Club or Organization Meetings: Never     Marital Status: Never    Housing Stability: Low Risk  (2/29/2024)    Housing Stability Vital Sign     Unable to Pay for Housing in the Last Year: No     Number of Places Lived in the Last Year: 1     Unstable Housing in the Last Year: No       Family History:   family history includes Diabetes in her father and paternal grandmother; Graves' disease in her mother; Hyperlipidemia in her father and mother; Hypertension in her mother; Stroke in her paternal grandmother.    Health  Maintenance   Topic Date Due    TETANUS VACCINE  05/18/2032    Hepatitis C Screening  Completed    Lipid Panel  Completed       Physical Exam:    Vital Signs  Pulse: 101  SpO2: 95 %  BP: 136/80  BP Location: Left arm  Patient Position: Sitting  Pain Score: 0-No pain  Height and Weight  Weight: 82.8 kg (182 lb 8.7 oz)]    Body mass index is 36.87 kg/m².    Physical Exam  Vitals and nursing note reviewed.   Constitutional:       Appearance: Normal appearance. She is not toxic-appearing.   HENT:      Head: Normocephalic and atraumatic.      Right Ear: Tympanic membrane normal.      Left Ear: Tympanic membrane normal.   Eyes:      Extraocular Movements: Extraocular movements intact.      Pupils: Pupils are equal, round, and reactive to light.   Cardiovascular:      Rate and Rhythm: Normal rate and regular rhythm.      Heart sounds: Normal heart sounds.   Pulmonary:      Effort: Pulmonary effort is normal.      Breath sounds: Normal breath sounds. No wheezing, rhonchi or rales.   Abdominal:      General: Bowel sounds are normal. There is no distension.      Palpations: Abdomen is soft.      Tenderness: There is no abdominal tenderness.   Musculoskeletal:         General: Normal range of motion.      Cervical back: Normal range of motion.      Lumbar back: No tenderness.   Skin:     General: Skin is warm and dry.      Capillary Refill: Capillary refill takes less than 2 seconds.   Neurological:      General: No focal deficit present.      Mental Status: She is alert and oriented to person, place, and time.   Psychiatric:         Mood and Affect: Mood normal.         Behavior: Behavior normal.         Judgment: Judgment normal.           Assessment:      ICD-10-CM ICD-9-CM   1. Chronic anemia  D64.9 285.9   2. Neurogenic bladder  N31.9 596.54   3. Congenitally solitary right kidney  Q60.0 753.0   4. Chronic insomnia  F51.04 780.52   5. Prediabetes  R73.03 790.29       Plan:    Initiate workup for anemia   Check labs and  stool samples.   Start Lunesta 2 mg for chronic insomnia  Continue to f/u with urology  Keep balanced diet. Keep a healthy balanced diet. Avoid starches and sweets. Work on weight loss  See labs below.   Follow up 6 months.     Orders Placed This Encounter   Procedures    Reticulocytes    Iron and TIBC    Iron    Transferrin    Ferritin    Lactate Dehydrogenase    Copper, Serum    Folate    Vitamin B12    Occult blood x 1, stool    Occult blood x 1, stool    Occult blood x 1, stool       Current Outpatient Medications   Medication Sig Dispense Refill    ISIBLOOM 0.15-0.03 mg per tablet TAKE 1 TABLET DAILY FOR BIRTH CONTROL 84 tablet 1    ketoconazole (NIZORAL) 2 % cream Apply topically once daily. 60 g 1    ondansetron (ZOFRAN-ODT) 4 MG TbDL Take 1 tablet (4 mg total) by mouth every 8 (eight) hours as needed (nausea). 20 tablet 0    oxyCODONE-acetaminophen (PERCOCET)  mg per tablet Take 1 tablet by mouth every 6 (six) hours as needed for Pain. 20 tablet 0    sodium chloride 0.9 % PgBk 100 mL with ertapenem 1 gram SolR 1 g Inject 1 g into the vein once daily. 7 g 0    traZODone (DESYREL) 50 MG tablet TAKE 1 TABLET BY MOUTH NIGHTLY AT BEDTIME AS NEEDED FOR SLEEP. 90 tablet 1    triamcinolone acetonide 0.1% (KENALOG) 0.1 % ointment Apply topically 2 (two) times daily. 80 g 1    valsartan-hydrochlorothiazide (DIOVAN-HCT) 80-12.5 mg per tablet Take 1 tablet by mouth once daily. 90 tablet 3    eszopiclone (LUNESTA) 2 MG Tab Take 1 tablet (2 mg total) by mouth every evening. 30 tablet 3     No current facility-administered medications for this visit.       There are no discontinued medications.    Follow up in about 6 months (around 9/7/2024).      Madie Horn MD  Scribe Attestation:   Duncan BREEN, am scribing for, and in the presence of, Dr.Arif Horn I performed the above scribed service and the documentation accurately describes the services I performed. I attest to the accuracy of the note.    IDr. Ramachandran  Justina, reviewed documentation as scribed above. I performed the services described in this documentation.  I agree that the record reflects my personal performance and is accurate and complete. Madie Horn MD.  03/07/2024

## 2024-03-14 LAB — COPPER SERPL-MCNC: 2458 UG/L (ref 810–1990)

## 2024-03-17 DIAGNOSIS — N92.0 MENORRHAGIA WITH REGULAR CYCLE: Primary | ICD-10-CM

## 2024-03-17 RX ORDER — FOLIC ACID 1 MG/1
1 TABLET ORAL DAILY
Qty: 90 TABLET | Refills: 3 | Status: SHIPPED | OUTPATIENT
Start: 2024-03-17 | End: 2025-03-17

## 2024-03-17 RX ORDER — FERROUS GLUCONATE 324(38)MG
324 TABLET ORAL
Qty: 60 TABLET | Refills: 6 | Status: SHIPPED | OUTPATIENT
Start: 2024-03-17

## 2024-03-18 ENCOUNTER — PATIENT MESSAGE (OUTPATIENT)
Dept: FAMILY MEDICINE | Facility: CLINIC | Age: 39
End: 2024-03-18
Payer: COMMERCIAL

## 2024-03-18 ENCOUNTER — TELEPHONE (OUTPATIENT)
Dept: OBSTETRICS AND GYNECOLOGY | Facility: CLINIC | Age: 39
End: 2024-03-18
Payer: COMMERCIAL

## 2024-03-18 NOTE — TELEPHONE ENCOUNTER
Lvm for pt to return call to get appointment scheduled with our department.   Referral placed for menorrhagia with regular cycle.

## 2024-03-27 ENCOUNTER — TELEPHONE (OUTPATIENT)
Dept: OBSTETRICS AND GYNECOLOGY | Facility: CLINIC | Age: 39
End: 2024-03-27
Payer: COMMERCIAL

## 2024-03-27 NOTE — TELEPHONE ENCOUNTER
Lvm for pt to return call to get appointment scheduled with our department.   Referral placed Menorrhagia with regular cycle.

## 2024-05-04 DIAGNOSIS — Z01.419 WELL WOMAN EXAM WITH ROUTINE GYNECOLOGICAL EXAM: ICD-10-CM

## 2024-05-04 RX ORDER — DESOGESTREL AND ETHINYL ESTRADIOL 0.15-0.03
KIT ORAL
Qty: 84 TABLET | Refills: 3 | Status: SHIPPED | OUTPATIENT
Start: 2024-05-04

## 2024-05-04 NOTE — TELEPHONE ENCOUNTER
Refill Decision Note   Addis Delvalle  is requesting a refill authorization.  Brief Assessment and Rationale for Refill:  Approve     Medication Therapy Plan:         Comments:     Note composed:2:35 PM 05/04/2024

## 2024-05-04 NOTE — TELEPHONE ENCOUNTER
No care due was identified.  [facility] Embedded Care Due Messages. Reference number: [id number].   5/04/2024 9:04:21 AM CDT

## 2024-07-01 RX ORDER — ESZOPICLONE 2 MG/1
2 TABLET, FILM COATED ORAL NIGHTLY
Qty: 30 TABLET | Refills: 3 | Status: SHIPPED | OUTPATIENT
Start: 2024-07-01

## 2024-07-01 NOTE — TELEPHONE ENCOUNTER
No care due was identified.  [facility] Embedded Care Due Messages. Reference number: [id number].   7/01/2024 9:01:56 AM CDT

## 2024-09-27 RX ORDER — ESZOPICLONE 2 MG/1
2 TABLET, FILM COATED ORAL NIGHTLY
Qty: 30 TABLET | Refills: 3 | Status: SHIPPED | OUTPATIENT
Start: 2024-09-27

## 2024-09-27 NOTE — TELEPHONE ENCOUNTER
No care due was identified.  [facility] Embedded Care Due Messages. Reference number: [id number].   9/27/2024 10:23:34 AM CDT

## 2024-12-03 DIAGNOSIS — R05.9 COUGH, UNSPECIFIED TYPE: Primary | ICD-10-CM

## 2024-12-03 RX ORDER — BENZONATATE 200 MG/1
200 CAPSULE ORAL 3 TIMES DAILY PRN
Qty: 30 CAPSULE | Refills: 0 | Status: SHIPPED | OUTPATIENT
Start: 2024-12-03 | End: 2024-12-13

## 2025-01-18 DIAGNOSIS — N92.0 MENORRHAGIA WITH REGULAR CYCLE: ICD-10-CM

## 2025-01-18 NOTE — TELEPHONE ENCOUNTER
Care Due:                  Date            Visit Type   Department     Provider  --------------------------------------------------------------------------------                                EP -                              PRIMARY      [facility]  Last Visit: 03-      CARE ([facility])   MEDICINE       Madie Horn  Next Visit: None Scheduled  None         None Found                                                            Last  Test          Frequency    Reason                     Performed    Due Date  --------------------------------------------------------------------------------    Office Visit  12 months..  folic....................  03- 03-    CMP.........  12 months..  valsartan-hydrochlorothia  02- 02-                             lupillo.....................    [facility] [employer] Embedded Care Due Messages. Reference number: [id number].   1/18/2025 8:53:51 AM CST

## 2025-01-22 RX ORDER — ESZOPICLONE 2 MG/1
2 TABLET, FILM COATED ORAL NIGHTLY
Qty: 30 TABLET | Refills: 3 | OUTPATIENT
Start: 2025-01-22

## 2025-01-22 RX ORDER — FOLIC ACID 1 MG/1
1000 TABLET ORAL
Qty: 90 TABLET | Refills: 3 | Status: SHIPPED | OUTPATIENT
Start: 2025-01-22

## 2025-02-12 RX ORDER — ESZOPICLONE 2 MG/1
2 TABLET, FILM COATED ORAL NIGHTLY
Qty: 30 TABLET | Refills: 3 | OUTPATIENT
Start: 2025-02-12

## 2025-02-12 NOTE — TELEPHONE ENCOUNTER
No care due was identified.  [facility] Embedded Care Due Messages. Reference number: [id number].   2/12/2025 4:24:14 PM CST

## 2025-02-13 ENCOUNTER — PATIENT MESSAGE (OUTPATIENT)
Dept: FAMILY MEDICINE | Facility: CLINIC | Age: 40
End: 2025-02-13
Payer: MEDICAID

## 2025-02-13 ENCOUNTER — TELEPHONE (OUTPATIENT)
Dept: FAMILY MEDICINE | Facility: CLINIC | Age: 40
End: 2025-02-13
Payer: COMMERCIAL

## 2025-02-13 DIAGNOSIS — I10 PRIMARY HYPERTENSION: ICD-10-CM

## 2025-02-13 DIAGNOSIS — F51.04 CHRONIC INSOMNIA: ICD-10-CM

## 2025-02-13 DIAGNOSIS — R74.8 ELEVATED LIVER ENZYMES: ICD-10-CM

## 2025-02-13 DIAGNOSIS — Z00.00 WELL ADULT EXAM: ICD-10-CM

## 2025-02-13 DIAGNOSIS — R73.03 PREDIABETES: ICD-10-CM

## 2025-02-13 DIAGNOSIS — N92.0 MENORRHAGIA WITH REGULAR CYCLE: Primary | ICD-10-CM

## 2025-02-13 RX ORDER — ESZOPICLONE 2 MG/1
2 TABLET, FILM COATED ORAL NIGHTLY
Qty: 30 TABLET | Refills: 3 | OUTPATIENT
Start: 2025-02-13

## 2025-02-13 NOTE — TELEPHONE ENCOUNTER
No care due was identified.  [facility] Embedded Care Due Messages. Reference number: [id number].   2/13/2025 12:16:29 PM CST

## 2025-02-14 NOTE — TELEPHONE ENCOUNTER
No care due was identified.  [facility] Embedded Care Due Messages. Reference number: [id number].   2/14/2025 3:13:01 PM CST

## 2025-02-15 NOTE — TELEPHONE ENCOUNTER
Refill Routing Note   Medication(s) are not appropriate for processing by [facility] for the following reason(s):        Allergy or intolerancen: valsartan-hydrochlorothiazide     [facility] action(s):  Defer             Appointments  past 12m or future 3m with PCP    Date Provider   Last Visit   3/7/2024 Madie Horn MD   Next Visit   2/27/2025 Madie Horn MD   ED visits in past 90 days: 0        Note composed:6:46 PM 02/14/2025

## 2025-02-16 RX ORDER — VALSARTAN AND HYDROCHLOROTHIAZIDE 80; 12.5 MG/1; MG/1
1 TABLET, FILM COATED ORAL
Qty: 90 TABLET | Refills: 0 | Status: SHIPPED | OUTPATIENT
Start: 2025-02-16

## 2025-02-20 ENCOUNTER — LAB VISIT (OUTPATIENT)
Dept: LAB | Facility: HOSPITAL | Age: 40
End: 2025-02-20
Attending: FAMILY MEDICINE
Payer: MEDICAID

## 2025-02-20 DIAGNOSIS — R73.03 PREDIABETES: ICD-10-CM

## 2025-02-20 DIAGNOSIS — Z00.00 WELL ADULT EXAM: ICD-10-CM

## 2025-02-20 DIAGNOSIS — I10 PRIMARY HYPERTENSION: ICD-10-CM

## 2025-02-20 DIAGNOSIS — R74.8 ELEVATED LIVER ENZYMES: ICD-10-CM

## 2025-02-20 DIAGNOSIS — N92.0 MENORRHAGIA WITH REGULAR CYCLE: ICD-10-CM

## 2025-02-20 DIAGNOSIS — F51.04 CHRONIC INSOMNIA: ICD-10-CM

## 2025-02-20 LAB
ALBUMIN SERPL BCP-MCNC: 3.4 G/DL (ref 3.5–5.2)
ALP SERPL-CCNC: 86 U/L (ref 40–150)
ALT SERPL W/O P-5'-P-CCNC: 19 U/L (ref 10–44)
ANION GAP SERPL CALC-SCNC: 12 MMOL/L (ref 8–16)
AST SERPL-CCNC: 24 U/L (ref 10–40)
BASOPHILS # BLD AUTO: 0.09 K/UL (ref 0–0.2)
BASOPHILS NFR BLD: 0.8 % (ref 0–1.9)
BILIRUB SERPL-MCNC: 0.4 MG/DL (ref 0.1–1)
BUN SERPL-MCNC: 15 MG/DL (ref 6–20)
CALCIUM SERPL-MCNC: 9.7 MG/DL (ref 8.7–10.5)
CHLORIDE SERPL-SCNC: 105 MMOL/L (ref 95–110)
CHOLEST SERPL-MCNC: 202 MG/DL (ref 120–199)
CHOLEST/HDLC SERPL: 3.5 {RATIO} (ref 2–5)
CO2 SERPL-SCNC: 23 MMOL/L (ref 23–29)
CREAT SERPL-MCNC: 1.2 MG/DL (ref 0.5–1.4)
DIFFERENTIAL METHOD BLD: ABNORMAL
EOSINOPHIL # BLD AUTO: 0.3 K/UL (ref 0–0.5)
EOSINOPHIL NFR BLD: 2.8 % (ref 0–8)
ERYTHROCYTE [DISTWIDTH] IN BLOOD BY AUTOMATED COUNT: 14.9 % (ref 11.5–14.5)
EST. GFR  (NO RACE VARIABLE): 59.1 ML/MIN/1.73 M^2
ESTIMATED AVG GLUCOSE: 157 MG/DL (ref 68–131)
GLUCOSE SERPL-MCNC: 162 MG/DL (ref 70–110)
HBA1C MFR BLD: 7.1 % (ref 4–5.6)
HCT VFR BLD AUTO: 37.4 % (ref 37–48.5)
HDLC SERPL-MCNC: 57 MG/DL (ref 40–75)
HDLC SERPL: 28.2 % (ref 20–50)
HGB BLD-MCNC: 11 G/DL (ref 12–16)
IMM GRANULOCYTES # BLD AUTO: 0.06 K/UL (ref 0–0.04)
IMM GRANULOCYTES NFR BLD AUTO: 0.5 % (ref 0–0.5)
LDLC SERPL CALC-MCNC: 109.2 MG/DL (ref 63–159)
LYMPHOCYTES # BLD AUTO: 2.9 K/UL (ref 1–4.8)
LYMPHOCYTES NFR BLD: 24.5 % (ref 18–48)
MCH RBC QN AUTO: 26.8 PG (ref 27–31)
MCHC RBC AUTO-ENTMCNC: 29.4 G/DL (ref 32–36)
MCV RBC AUTO: 91 FL (ref 82–98)
MONOCYTES # BLD AUTO: 0.8 K/UL (ref 0.3–1)
MONOCYTES NFR BLD: 7 % (ref 4–15)
NEUTROPHILS # BLD AUTO: 7.5 K/UL (ref 1.8–7.7)
NEUTROPHILS NFR BLD: 64.4 % (ref 38–73)
NONHDLC SERPL-MCNC: 145 MG/DL
NRBC BLD-RTO: 0 /100 WBC
PLATELET # BLD AUTO: 456 K/UL (ref 150–450)
PMV BLD AUTO: 9.9 FL (ref 9.2–12.9)
POTASSIUM SERPL-SCNC: 4.4 MMOL/L (ref 3.5–5.1)
PROT SERPL-MCNC: 7.5 G/DL (ref 6–8.4)
RBC # BLD AUTO: 4.11 M/UL (ref 4–5.4)
SODIUM SERPL-SCNC: 140 MMOL/L (ref 136–145)
TRIGL SERPL-MCNC: 179 MG/DL (ref 30–150)
WBC # BLD AUTO: 11.62 K/UL (ref 3.9–12.7)

## 2025-02-20 PROCEDURE — 80053 COMPREHEN METABOLIC PANEL: CPT | Performed by: FAMILY MEDICINE

## 2025-02-20 PROCEDURE — 80061 LIPID PANEL: CPT | Performed by: FAMILY MEDICINE

## 2025-02-20 PROCEDURE — 83036 HEMOGLOBIN GLYCOSYLATED A1C: CPT | Performed by: FAMILY MEDICINE

## 2025-02-20 PROCEDURE — 85025 COMPLETE CBC W/AUTO DIFF WBC: CPT | Performed by: FAMILY MEDICINE

## 2025-02-21 ENCOUNTER — RESULTS FOLLOW-UP (OUTPATIENT)
Dept: FAMILY MEDICINE | Facility: CLINIC | Age: 40
End: 2025-02-21

## 2025-02-27 ENCOUNTER — OFFICE VISIT (OUTPATIENT)
Dept: FAMILY MEDICINE | Facility: CLINIC | Age: 40
End: 2025-02-27
Payer: MEDICAID

## 2025-02-27 ENCOUNTER — TELEPHONE (OUTPATIENT)
Dept: FAMILY MEDICINE | Facility: CLINIC | Age: 40
End: 2025-02-27

## 2025-02-27 VITALS
BODY MASS INDEX: 36.58 KG/M2 | OXYGEN SATURATION: 98 % | HEIGHT: 59 IN | HEART RATE: 94 BPM | DIASTOLIC BLOOD PRESSURE: 84 MMHG | WEIGHT: 181.44 LBS | SYSTOLIC BLOOD PRESSURE: 136 MMHG

## 2025-02-27 DIAGNOSIS — E11.9 DIABETES MELLITUS, NEW ONSET: ICD-10-CM

## 2025-02-27 DIAGNOSIS — M62.08 DIASTASIS RECTI: ICD-10-CM

## 2025-02-27 DIAGNOSIS — G47.00 INSOMNIA, UNSPECIFIED TYPE: Primary | ICD-10-CM

## 2025-02-27 DIAGNOSIS — G47.00 INSOMNIA, UNSPECIFIED TYPE: ICD-10-CM

## 2025-02-27 DIAGNOSIS — I10 PRIMARY HYPERTENSION: ICD-10-CM

## 2025-02-27 DIAGNOSIS — F41.9 ANXIETY: ICD-10-CM

## 2025-02-27 DIAGNOSIS — F51.04 CHRONIC INSOMNIA: ICD-10-CM

## 2025-02-27 DIAGNOSIS — L91.8 SKIN TAG: Primary | ICD-10-CM

## 2025-02-27 PROCEDURE — 11104 PUNCH BX SKIN SINGLE LESION: CPT | Mod: S$PBB,,, | Performed by: FAMILY MEDICINE

## 2025-02-27 PROCEDURE — 99213 OFFICE O/P EST LOW 20 MIN: CPT | Mod: PBBFAC,PN | Performed by: FAMILY MEDICINE

## 2025-02-27 PROCEDURE — 3075F SYST BP GE 130 - 139MM HG: CPT | Mod: CPTII,,, | Performed by: FAMILY MEDICINE

## 2025-02-27 PROCEDURE — 3051F HG A1C>EQUAL 7.0%<8.0%: CPT | Mod: CPTII,,, | Performed by: FAMILY MEDICINE

## 2025-02-27 PROCEDURE — 3008F BODY MASS INDEX DOCD: CPT | Mod: CPTII,,, | Performed by: FAMILY MEDICINE

## 2025-02-27 PROCEDURE — 1159F MED LIST DOCD IN RCRD: CPT | Mod: CPTII,,, | Performed by: FAMILY MEDICINE

## 2025-02-27 PROCEDURE — 99999 PR PBB SHADOW E&M-EST. PATIENT-LVL III: CPT | Mod: PBBFAC,,, | Performed by: FAMILY MEDICINE

## 2025-02-27 PROCEDURE — 11104 PUNCH BX SKIN SINGLE LESION: CPT | Mod: PBBFAC,PN | Performed by: FAMILY MEDICINE

## 2025-02-27 PROCEDURE — 99214 OFFICE O/P EST MOD 30 MIN: CPT | Mod: 25,S$PBB,, | Performed by: FAMILY MEDICINE

## 2025-02-27 PROCEDURE — 3079F DIAST BP 80-89 MM HG: CPT | Mod: CPTII,,, | Performed by: FAMILY MEDICINE

## 2025-02-27 RX ORDER — LANCETS
1 EACH MISCELLANEOUS 3 TIMES DAILY
Qty: 100 EACH | Refills: 12 | Status: SHIPPED | OUTPATIENT
Start: 2025-02-27

## 2025-02-27 RX ORDER — INSULIN PUMP SYRINGE, 3 ML
EACH MISCELLANEOUS
Qty: 1 EACH | Refills: 1 | Status: SHIPPED | OUTPATIENT
Start: 2025-02-27

## 2025-02-27 NOTE — TELEPHONE ENCOUNTER
----- Message from Kirk sent at 2/27/2025  4:18 PM CST -----  .Type:  Pharmacy Calling to Clarify an RXName of Caller:lacie [facility] Name:.[facility] - [address] [facility] [address]Ptezs: [phone] Fax: [phone] Name:diabetic supplies and injection What do they need to clarify?:called in needing to know the diagnosis code if patient  is type 1 or type 2 Best Call Back Number:[phone] Additional Information:

## 2025-02-27 NOTE — PROGRESS NOTES
Chief Complaint:    Chief Complaint   Patient presents with    Medication Refill       History of Present Illness:    History of Present Illness    Patient presents today for follow up of multiple concerns including a painful skin tag She reports a painful skin tag in breast area causing discomfort with bra friction. She reports abdominal concerns following previous umbilical surgery. She has rectus diastasis of significant size. She reports constant, severe anxiety without periods of relief. She denies specific triggers or life events as causes, but speculates it may be age-related. She reports eating 1-2 times daily, consisting mainly of burgers and junk food. She has a history of iron deficiency. She is currently taking blood pressure medication and folic acid. Hemoglobin has improved from 10-10.6 to 11. A1C is 7.1.      ROS:  General: denies fever, denies chills, denies fatigue, denies weight gain, denies weight loss, denies loss of appetite  Eyes: denies vision changes, denies blurry vision, denies eye pain, denies eye discharge  ENT: denies ear pain, denies hearing loss, denies tinnitus, denies nasal congestion, denies sore throat  Cardiovascular: denies chest pain, denies palpitations, denies lower extremity edema  Respiratory: denies cough, denies shortness of breath, denies wheezing, denies sputum production  Endocrine: denies polyuria, denies polydipsia, denies heat intolerance, denies cold intolerance  Gastrointestinal: denies abdominal pain, denies heartburn, denies nausea, denies vomiting, denies diarrhea, denies constipation, denies blood in stool  Genitourinary: denies dysuria, denies urgency, denies frequency, denies hematuria, denies nocturia, denies incontinence  Heme & Lymphatic: denies easy or excessive bleeding, denies easy bruising, denies swollen lymph nodes  Musculoskeletal: denies muscle pain, denies back pain, denies joint pain, denies joint swelling  Skin: denies rash, admits lesion,  denies itching, denies skin texture changes, denies skin color changes  Neurological: denies headache, denies dizziness, denies numbness, denies tingling, denies seizure activity, denies speech difficulty, denies memory loss, denies confusion  Psychiatric: admits anxiety, denies depression, denies sleep difficulty           Past Medical History:   Diagnosis Date    Spina bifida     Spina bifida        Social History:  Social History     Socioeconomic History    Marital status: Single   Tobacco Use    Smoking status: Never    Smokeless tobacco: Never   Substance and Sexual Activity    Alcohol use: Yes     Comment: Occaisonally    Drug use: Never    Sexual activity: Never     Social Drivers of Health     Financial Resource Strain: Low Risk  (2/29/2024)    Overall Financial Resource Strain (CARDIA)     Difficulty of Paying Living Expenses: Not very hard   Food Insecurity: No Food Insecurity (2/29/2024)    Hunger Vital Sign     Worried About Running Out of Food in the Last Year: Never true     Ran Out of Food in the Last Year: Never true   Transportation Needs: No Transportation Needs (2/29/2024)    PRAPARE - Transportation     Lack of Transportation (Medical): No     Lack of Transportation (Non-Medical): No   Physical Activity: Sufficiently Active (2/29/2024)    Exercise Vital Sign     Days of Exercise per Week: 2 days     Minutes of Exercise per Session: 150+ min   Stress: Stress Concern Present (2/29/2024)    [employer] - Occupational Stress Questionnaire     Feeling of Stress : To some extent   Housing Stability: Low Risk  (2/29/2024)    Housing Stability Vital Sign     Unable to Pay for Housing in the Last Year: No     Number of Places Lived in the Last Year: 1     Unstable Housing in the Last Year: No       Family History:   family history includes Diabetes in her father and paternal grandmother; Graves' disease in her mother; Hyperlipidemia in her father and mother; Hypertension in  "her mother; Stroke in her paternal grandmother.    Health Maintenance   Topic Date Due    Pneumococcal Vaccines (Age 0-49) (1 of 2 - PCV) Never done    Influenza Vaccine (1) Never done    COVID-19 Vaccine (3 - 2024-25 season) 09/01/2024    Cervical Cancer Screening  06/22/2027    Hemoglobin A1c (Diabetic Prevention Screening)  02/20/2028    TETANUS VACCINE  05/18/2032    RSV Vaccine (Age 60+ and Pregnant patients) (1 - 1-dose 75+ series) 09/18/2060    Hepatitis C Screening  Completed    HIV Screening  Completed    Lipid Panel  Completed       Exam:Physical     Vital Signs  Pulse: 94  SpO2: 98 %  BP: 136/84  BP Location: Left arm  Patient Position: Sitting  Pain Score: 0-No pain  Height and Weight  Height: 4' 11" (149.9 cm)  Weight: 82.3 kg (181 lb 7 oz)  BSA (Calculated - sq m): 1.85 sq meters  BMI (Calculated): 36.6  Weight in (lb) to have BMI = 25: 123.5]    Body mass index is 36.65 kg/m².    Physical Exam    General: Well-developed. Well-nourished. No acute distress.  Eyes: EOMI. Sclerae anicteric.  HENT: Normocephalic. Atraumatic. Nares patent. Moist oral mucosa.  Cardiovascular: Regular rate. Regular rhythm. No murmurs. No rubs. No gallops. Normal S1, S2.  Respiratory: Normal respiratory effort. Clear to auscultation bilaterally. No rales. No rhonchi. No wheezing.  Musculoskeletal: No  obvious deformity.  Extremities: No lower extremity edema.  Neurological: Alert & oriented x3. No slurred speech. Normal gait.  Psychiatric: Normal mood. Normal affect. Good insight. Good judgment.  Skin: Warm. Dry. No rash.   Skin tag lower chest wall, cryptherapy done  Abdomen- diastasis recti          Assessment:        ICD-10-CM ICD-9-CM   1. Skin tag  L91.8 701.9   2. Anxiety  F41.9 300.00   3. Diastasis recti  M62.08 728.84   4. Primary hypertension  I10 401.9   5. Insomnia, unspecified type  G47.00 780.52   6. Diabetes mellitus, new onset  E11.9 250.00   7. Chronic insomnia  F51.04 780.52         Plan:    Assessment & Plan "    MEDICAL DECISION MAKING:  - Assessed skin tag and determined it could be removed via cryotherapy  - Evaluated abdominal concern, diagnosed as rectus diastasis rather than hernia  - Noted worsening A1c (7.1), indicating progression from pre-diabetes to diabetes  - Considered patient's solitary kidney status in treatment planning  - Determined patient eligible for weight loss/diabetes management injectable medication    PATIENT EDUCATION:  - Explained rectus diastasis as splitting of abdominal muscles, not a hernia  - Discussed potential complications of uncontrolled diabetes, including kidney damage, blindness, vascular issues, myocardial infarction, and cerebrovascular accident  - Educated on impact of diet on blood sugar, emphasizing avoidance of junk food, sodas, wheat and corn products  - Provided guidance on healthy meal choices, focusing on vegetables, meat, and beans  - Explained target blood sugar ranges for fasting and post-meal measurements    ACTION ITEMS/LIFESTYLE:  - Patient to eliminate all junk food from diet, including hamburgers, sodas, fries, cakes, cookies, candies, ice cream, potato chips  - Patient to avoid wheat and corn products, including bread, biscuits, tortilla wraps  - Recommend using alternatives like lettuce or cauliflower wraps  - Patient to drink plain water instead of sweet or soft drinks  - Recommend walking for 15-20 minutes 2 times daily or consider gym visits  - Patient to aim to lose 20 lbs to help normalize blood sugar  - Patient to check blood sugar regularly and record results  - Patient to read food labels to monitor carbohydrate and calorie intake    MEDICATIONS:  - Started weekly injectable medication for weight loss and diabetes management  - Continued blood pressure medication  - Continued folic acid supplementation    ORDERS:  - Ordered liquid nitrogen for skin tag removal  - Prescribed glucose meter for blood sugar monitoring    FOLLOW UP:  - Follow up in 3 months to  reassess blood sugar and medication effectiveness  - Send blood sugar readings to the office in about 1 month         Addis was seen today for medication refill.    Diagnoses and all orders for this visit:    Skin tag    Anxiety    Diastasis recti    Primary hypertension  -     Comprehensive Metabolic Panel; Future    Insomnia, unspecified type    Diabetes mellitus, new onset  -     Lipid Panel; Future  -     Hemoglobin A1C; Future    Chronic insomnia  -     CBC Auto Differential; Future  -     Iron and TIBC; Future  -     Ferritin; Future  -     Folate; Future    Other orders  -     dulaglutide (TRULICITY) 1.5 mg/0.5 mL pen injector; Inject 1.5 mg into the skin every 7 days.  -     lancets (ACCU-CHEK SOFTCLIX LANCETS) Misc; 1 each by Misc.(Non-Drug; Combo Route) route 3 (three) times daily.  -     blood sugar diagnostic Strp; 1 each by Misc.(Non-Drug; Combo Route) route 3 (three) times daily.  -     blood-glucose meter kit; Use as instructed        Follow up in about 3 months (around 5/27/2025).      Madie Horn MD    Consent obtained, cryotherapy done to the skin tag on the left lower chest wall.  Single skin tag about 3 mm in size.

## 2025-02-28 ENCOUNTER — PATIENT MESSAGE (OUTPATIENT)
Dept: FAMILY MEDICINE | Facility: CLINIC | Age: 40
End: 2025-02-28
Payer: MEDICAID

## 2025-02-28 RX ORDER — ESZOPICLONE 2 MG/1
2 TABLET, FILM COATED ORAL NIGHTLY
Qty: 30 TABLET | Refills: 3 | Status: SHIPPED | OUTPATIENT
Start: 2025-02-28

## 2025-02-28 NOTE — TELEPHONE ENCOUNTER
No care due was identified.  Health [facility] Embedded Care Due Messages. Reference number: [id number].   2/27/2025 6:11:03 PM CST

## 2025-03-24 ENCOUNTER — PATIENT MESSAGE (OUTPATIENT)
Dept: FAMILY MEDICINE | Facility: CLINIC | Age: 40
End: 2025-03-24
Payer: MEDICAID

## 2025-03-24 DIAGNOSIS — E11.9 DIABETES MELLITUS, NEW ONSET: Primary | ICD-10-CM

## 2025-03-26 ENCOUNTER — LAB VISIT (OUTPATIENT)
Dept: LAB | Facility: HOSPITAL | Age: 40
End: 2025-03-26
Attending: FAMILY MEDICINE
Payer: MEDICAID

## 2025-03-26 ENCOUNTER — OFFICE VISIT (OUTPATIENT)
Dept: FAMILY MEDICINE | Facility: CLINIC | Age: 40
End: 2025-03-26
Payer: MEDICAID

## 2025-03-26 VITALS
WEIGHT: 174.94 LBS | OXYGEN SATURATION: 99 % | BODY MASS INDEX: 35.27 KG/M2 | HEART RATE: 85 BPM | DIASTOLIC BLOOD PRESSURE: 62 MMHG | SYSTOLIC BLOOD PRESSURE: 138 MMHG | HEIGHT: 59 IN

## 2025-03-26 DIAGNOSIS — R55 SYNCOPE, UNSPECIFIED SYNCOPE TYPE: Primary | ICD-10-CM

## 2025-03-26 DIAGNOSIS — D50.9 IRON DEFICIENCY ANEMIA, UNSPECIFIED IRON DEFICIENCY ANEMIA TYPE: ICD-10-CM

## 2025-03-26 DIAGNOSIS — I10 PRIMARY HYPERTENSION: ICD-10-CM

## 2025-03-26 DIAGNOSIS — E11.9 CONTROLLED TYPE 2 DIABETES MELLITUS WITHOUT COMPLICATION, WITHOUT LONG-TERM CURRENT USE OF INSULIN: ICD-10-CM

## 2025-03-26 DIAGNOSIS — R31.29 HEMATURIA, MICROSCOPIC: ICD-10-CM

## 2025-03-26 LAB — RETICS/RBC NFR AUTO: 1.4 % (ref 0.5–2.5)

## 2025-03-26 PROCEDURE — 99213 OFFICE O/P EST LOW 20 MIN: CPT | Mod: PBBFAC,PN | Performed by: FAMILY MEDICINE

## 2025-03-26 PROCEDURE — 36415 COLL VENOUS BLD VENIPUNCTURE: CPT | Mod: PN

## 2025-03-26 PROCEDURE — 85045 AUTOMATED RETICULOCYTE COUNT: CPT

## 2025-03-26 PROCEDURE — 99999 PR PBB SHADOW E&M-EST. PATIENT-LVL III: CPT | Mod: PBBFAC,,, | Performed by: FAMILY MEDICINE

## 2025-03-26 PROCEDURE — 84466 ASSAY OF TRANSFERRIN: CPT

## 2025-03-26 PROCEDURE — 82607 VITAMIN B-12: CPT

## 2025-03-26 PROCEDURE — 82746 ASSAY OF FOLIC ACID SERUM: CPT

## 2025-03-26 PROCEDURE — 82728 ASSAY OF FERRITIN: CPT

## 2025-03-26 RX ORDER — GLIPIZIDE AND METFORMIN HCL 5; 500 MG/1; MG/1
1 TABLET, FILM COATED ORAL
Qty: 180 TABLET | Refills: 3 | Status: SHIPPED | OUTPATIENT
Start: 2025-03-26 | End: 2026-03-26

## 2025-03-26 RX ORDER — SULFAMETHOXAZOLE AND TRIMETHOPRIM 800; 160 MG/1; MG/1
1 TABLET ORAL DAILY
COMMUNITY
Start: 2025-03-25 | End: 2025-04-01

## 2025-03-26 RX ORDER — DULAGLUTIDE 1.5 MG/.5ML
1.5 INJECTION, SOLUTION SUBCUTANEOUS
Qty: 4 PEN | Refills: 11 | Status: SHIPPED | OUTPATIENT
Start: 2025-03-26 | End: 2026-03-26

## 2025-03-26 RX ORDER — LEVOFLOXACIN 500 MG/1
500 TABLET, FILM COATED ORAL EVERY MORNING
COMMUNITY
Start: 2025-03-21 | End: 2025-03-26

## 2025-03-26 NOTE — PROGRESS NOTES
Chief Complaint:    Chief Complaint   Patient presents with    Hospital Follow Up       History of Present Illness:  Patient with neurogenic bladder presents today for annual,     History of Present Illness    Patient presents today for follow up after recent hospitalization for syncope She experienced a syncopal episode resulting in loss of consciousness and facial injury from hitting a concrete floor. A bystander noted she briefly stopped breathing and required intervention with two back strikes to revive her. CT of the brain was normal with no bleeding, and her shunt was functioning properly. Hospital workup revealed low potassium, dehydration, and a urinary tract infection with E. coli. She has a history of low folic acid and B12 levels from last year and takes folic acid supplementation. She reports regular monthly menstrual periods with consistent intervals and denies heavy menstrual flow.      Diabetes running high, A1c 7 insurance has refused to pay for GLP 1 inhibitors         Patient has underlying neurogenic bladder secondary to spina bifida and does intermittent catheterization. Still doing catheterization, not on preventative antibiotic anymore. Has kidney stones, probably causing infection. Was supposed have surgery but it was never scheduled due to COVID. Following with Dr. Reed. Her left kidney is atrophied. Her right kidney has hydronephrosis.          ROS:  Review of Systems   Constitutional:  Negative for appetite change, chills and fever.   HENT:  Negative for congestion, ear pain, postnasal drip, rhinorrhea, sinus pressure and sinus pain.    Eyes:  Negative for pain.   Respiratory:  Negative for cough, chest tightness and shortness of breath.    Cardiovascular:  Negative for chest pain and palpitations.   Gastrointestinal:  Negative for abdominal pain, blood in stool, constipation, diarrhea and nausea.   Genitourinary:  Negative for difficulty urinating, dysuria, flank pain and hematuria.    Musculoskeletal:  Negative for arthralgias and myalgias.   Skin:  Negative for pallor and wound.   Neurological:  Negative for dizziness, tremors, speech difficulty, light-headedness and headaches.   Psychiatric/Behavioral:  Negative for behavioral problems, dysphoric mood and sleep disturbance. The patient is not nervous/anxious.    All other systems reviewed and are negative.      Past Medical History:   Diagnosis Date    Spina bifida     Spina bifida        Social History:  Social History     Socioeconomic History    Marital status: Single   Tobacco Use    Smoking status: Never    Smokeless tobacco: Never   Substance and Sexual Activity    Alcohol use: Not Currently     Comment: Occaisonally    Drug use: Never    Sexual activity: Not Currently     Social Drivers of Health     Financial Resource Strain: Low Risk  (3/24/2025)    Overall Financial Resource Strain (CARDIA)     Difficulty of Paying Living Expenses: Not very hard   Food Insecurity: No Food Insecurity (3/24/2025)    Hunger Vital Sign     Worried About Running Out of Food in the Last Year: Never true     Ran Out of Food in the Last Year: Never true   Transportation Needs: No Transportation Needs (3/24/2025)    PRAPARE - Transportation     Lack of Transportation (Medical): No     Lack of Transportation (Non-Medical): No   Physical Activity: Insufficiently Active (3/24/2025)    Exercise Vital Sign     Days of Exercise per Week: 4 days     Minutes of Exercise per Session: 30 min   Stress: No Stress Concern Present (3/24/2025)    [employer] [employer] - Occupational Stress Questionnaire     Feeling of Stress : Only a little   Housing Stability: Low Risk  (3/24/2025)    Housing Stability Vital Sign     Unable to Pay for Housing in the Last Year: No     Number of Times Moved in the Last Year: 0     Homeless in the Last Year: No       Family History:   family history includes Diabetes in her father and paternal grandmother; Graves' disease  "in her mother; Hyperlipidemia in her father and mother; Hypertension in her mother; Stroke in her paternal grandmother.    Health Maintenance   Topic Date Due    Influenza Vaccine (1) 06/30/2025 (Originally 9/1/2024)    COVID-19 Vaccine (3 - 2024-25 season) 03/26/2026 (Originally 9/1/2024)    Pneumococcal Vaccines (Age 0-49) (1 of 2 - PCV) 03/26/2026 (Originally 9/18/2004)    Cervical Cancer Screening  06/22/2027    Hemoglobin A1c (Diabetic Prevention Screening)  02/20/2028    TETANUS VACCINE  05/18/2032    RSV Vaccine (Age 60+ and Pregnant patients) (1 - 1-dose 75+ series) 09/18/2060    Hepatitis C Screening  Completed    HIV Screening  Completed    Lipid Panel  Completed       Physical Exam:    Vital Signs  Pulse: 85  SpO2: 99 %  BP: 138/62  Pain Score: 0-No pain  Height and Weight  Height: 4' 11" (149.9 cm)  Weight: 79.3 kg (174 lb 15 oz)  BSA (Calculated - sq m): 1.82 sq meters  BMI (Calculated): 35.3  Weight in (lb) to have BMI = 25: 123.5]    Body mass index is 35.33 kg/m².    Physical Exam  Vitals and nursing note reviewed.   Constitutional:       Appearance: Normal appearance. She is not toxic-appearing.   HENT:      Head: Normocephalic and atraumatic.      Right Ear: Tympanic membrane normal.      Left Ear: Tympanic membrane normal.   Eyes:      Extraocular Movements: Extraocular movements intact.      Pupils: Pupils are equal, round, and reactive to light.   Cardiovascular:      Rate and Rhythm: Normal rate and regular rhythm.      Heart sounds: Normal heart sounds.   Pulmonary:      Effort: Pulmonary effort is normal.      Breath sounds: Normal breath sounds. No wheezing, rhonchi or rales.   Abdominal:      General: Bowel sounds are normal. There is no distension.      Palpations: Abdomen is soft.      Tenderness: There is no abdominal tenderness.   Musculoskeletal:         General: Normal range of motion.      Cervical back: Normal range of motion.      Lumbar back: No tenderness.   Skin:     General: " Skin is warm and dry.      Capillary Refill: Capillary refill takes less than 2 seconds.   Neurological:      General: No focal deficit present.      Mental Status: She is alert and oriented to person, place, and time.   Psychiatric:         Mood and Affect: Mood normal.         Behavior: Behavior normal.         Judgment: Judgment normal.           Assessment:      ICD-10-CM ICD-9-CM   1. Syncope, unspecified syncope type  R55 780.2   2. Iron deficiency anemia, unspecified iron deficiency anemia type  D50.9 280.9   3. Primary hypertension  I10 401.9   4. Controlled type 2 diabetes mellitus without complication, without long-term current use of insulin  E11.9 250.00   5. Hematuria, microscopic  R31.29 599.72       Plan:    Assessment & Plan    MEDICAL DECISION MAKING:  - Reviewed hospital records from recent admission, noting low potassium, dehydration, and bladder infection as contributing factors to syncope.  - Assessed CT results, confirming no intracranial bleeding.  - Evaluated ongoing anemia, with hemoglobin at 9.6.  - Considered cardiac arrhythmia as potential cause for syncope, recommending cardiac monitoring.  - Reviewed glucose logs, noting elevated fasting levels.  - Determined need for oral diabetes medication to address fasting hyperglycemia.    PATIENT EDUCATION:  - Explained nocturnal release of hormones leading to elevated fasting glucose in diabetes.    ACTION ITEMS/LIFESTYLE:  - Patient to walk for about 20 minutes after each meal to help control blood sugar.  - Patient to keep night meal the lightest meal of the day and eat least amount of starches at dinner time.  - Discussed importance of keeping evening meals light and low in starches to help manage morning glucose levels.    MEDICATIONS:  - Started Glipizide-metformin po bid    ORDERS:  - Ordered iron studies to investigate anemia.  - Ordered fecal occult blood test (3 separate stool samples).  - Ordered heart monitor for 2 weeks to detect any  abnormal heart rhythms.    FOLLOW UP:  - Follow up in about 10 days with a urine sample after finishing antibiotic course for UTI to arrange a follow-up urine test.         Orders Placed This Encounter   Procedures    Folate    Vitamin B12    Iron and TIBC    Occult blood x 1, stool    Occult blood x 1, stool    Occult blood x 1, stool    Iron    Ferritin    Reticulocytes    Urinalysis, Reflex to Urine Culture    Cardiac Monitor - 3-15 Day Adult ([facility]upid Only)       Current Outpatient Medications   Medication Sig Dispense Refill    blood sugar diagnostic Strp 1 each by Misc.(Non-Drug; Combo Route) route 3 (three) times daily. 100 each 12    blood-glucose meter kit Use as instructed 1 each 1    eszopiclone (LUNESTA) 2 MG Tab Take 1 tablet (2 mg total) by mouth every evening. 30 tablet 3    folic acid (FOLVITE) 1 MG tablet TAKE 1 TABLET BY MOUTH EVERY DAY 90 tablet 3    ISIBLOOM 0.15-0.03 mg per tablet TAKE 1 TABLET by mouth DAILY FOR BIRTH CONTROL 84 tablet 3    ketoconazole (NIZORAL) 2 % cream Apply topically once daily. 60 g 1    lancets (ACCU-CHEK SOFTCLIX LANCETS) Misc 1 each by Misc.(Non-Drug; Combo Route) route 3 (three) times daily. 100 each 12    ondansetron (ZOFRAN-ODT) 4 MG TbDL Take 1 tablet (4 mg total) by mouth every 8 (eight) hours as needed (nausea). 20 tablet 0    sulfamethoxazole-trimethoprim 800-160mg (BACTRIM DS) 800-160 mg Tab Take 1 tablet by mouth once daily.      triamcinolone acetonide 0.1% (KENALOG) 0.1 % ointment Apply topically 2 (two) times daily. 80 g 1    valsartan-hydrochlorothiazide (DIOVAN-HCT) 80-12.5 mg per tablet TAKE ONE TABLET BY MOUTH once DAILY 90 tablet 0    glipizide-metformin (METAGLIP) 5-500 mg per tablet Take 1 tablet by mouth 2 (two) times daily before meals. 180 tablet 3     No current facility-administered medications for this visit.       Medications Discontinued During This Encounter   Medication Reason    ferrous gluconate (FERGON) 324 MG tablet Patient no longer  taking    levoFLOXacin (LEVAQUIN) 500 MG tablet Patient no longer taking    dulaglutide (TRULICITY) 1.5 mg/0.5 mL pen injector        Follow up in about 1 month (around 4/26/2025), or if symptoms worsen or fail to improve.      Madie Horn MD  Scribe Attestation:

## 2025-03-27 LAB
FERRITIN SERPL-MCNC: 94 NG/ML (ref 20–300)
FOLATE SERPL-MCNC: 15.3 NG/ML (ref 4–24)
IRON SATN MFR SERPL: 10 % (ref 20–50)
IRON SERPL-MCNC: 46 UG/DL (ref 30–160)
TIBC SERPL-MCNC: 460 UG/DL (ref 250–450)
TRANSFERRIN SERPL-MCNC: 311 MG/DL (ref 200–375)
VIT B12 SERPL-MCNC: 275 PG/ML (ref 210–950)

## 2025-03-28 ENCOUNTER — HOSPITAL ENCOUNTER (OUTPATIENT)
Dept: CARDIOLOGY | Facility: HOSPITAL | Age: 40
Discharge: HOME | End: 2025-03-28
Attending: FAMILY MEDICINE
Payer: MEDICAID

## 2025-03-28 DIAGNOSIS — R55 SYNCOPE, UNSPECIFIED SYNCOPE TYPE: ICD-10-CM

## 2025-03-30 ENCOUNTER — RESULTS FOLLOW-UP (OUTPATIENT)
Dept: FAMILY MEDICINE | Facility: CLINIC | Age: 40
End: 2025-03-30
Payer: MEDICAID

## 2025-03-30 DIAGNOSIS — R79.89 LOW VITAMIN B12 LEVEL: Primary | ICD-10-CM

## 2025-03-31 RX ORDER — CYANOCOBALAMIN 1000 UG/ML
1000 INJECTION, SOLUTION INTRAMUSCULAR; SUBCUTANEOUS
Qty: 16 ML | Refills: 0 | Status: SHIPPED | OUTPATIENT
Start: 2025-03-31

## 2025-04-10 DIAGNOSIS — Z01.419 WELL WOMAN EXAM WITH ROUTINE GYNECOLOGICAL EXAM: ICD-10-CM

## 2025-04-10 RX ORDER — DESOGESTREL AND ETHINYL ESTRADIOL 0.15-0.03
KIT ORAL
Qty: 84 TABLET | Refills: 3 | Status: SHIPPED | OUTPATIENT
Start: 2025-04-10

## 2025-04-10 NOTE — TELEPHONE ENCOUNTER
Refill Decision Note   Addis Delvalle  is requesting a refill authorization.  Brief Assessment and Rationale for Refill:  Approve     Medication Therapy Plan:         Pharmacist review requested: Yes   Comments:     Note composed:10:11 AM 04/10/2025

## 2025-04-10 NOTE — TELEPHONE ENCOUNTER
Refill Routing Note   Medication(s) are not appropriate for processing by [facility] for the following reason(s):        Drug-disease interaction    [facility] action(s):  Defer        Medication Therapy Plan: ISIBLOOM and Diabetes mellitus, new onset; Controlled type 2 diabetes mellitus without complication, without long-term current use of insulin; BP    Pharmacist review requested: Yes     Appointments  past 12m or future 3m with PCP    Date Provider   Last Visit   3/26/2025 Madie Horn MD   Next Visit   4/28/2025 Madie Horn MD   ED visits in past 90 days: 0        Note composed:10:05 AM 04/10/2025

## 2025-04-10 NOTE — TELEPHONE ENCOUNTER
No care due was identified.  Health [facility] Embedded Care Due Messages. Reference number: [id number].   4/10/2025 9:24:26 AM CDT

## 2025-04-22 ENCOUNTER — TELEPHONE (OUTPATIENT)
Dept: FAMILY MEDICINE | Facility: CLINIC | Age: 40
End: 2025-04-22
Payer: MEDICAID

## 2025-04-22 DIAGNOSIS — D50.9 IRON DEFICIENCY ANEMIA, UNSPECIFIED IRON DEFICIENCY ANEMIA TYPE: Primary | ICD-10-CM

## 2025-04-28 ENCOUNTER — OFFICE VISIT (OUTPATIENT)
Dept: FAMILY MEDICINE | Facility: CLINIC | Age: 40
End: 2025-04-28
Payer: MEDICAID

## 2025-04-28 VITALS
SYSTOLIC BLOOD PRESSURE: 126 MMHG | DIASTOLIC BLOOD PRESSURE: 76 MMHG | OXYGEN SATURATION: 96 % | HEIGHT: 59 IN | HEART RATE: 96 BPM | WEIGHT: 173.31 LBS | BODY MASS INDEX: 34.94 KG/M2

## 2025-04-28 DIAGNOSIS — G47.00 INSOMNIA, UNSPECIFIED TYPE: ICD-10-CM

## 2025-04-28 DIAGNOSIS — D64.9 ANEMIA, UNSPECIFIED TYPE: ICD-10-CM

## 2025-04-28 DIAGNOSIS — E53.8 LOW SERUM VITAMIN B12: ICD-10-CM

## 2025-04-28 DIAGNOSIS — E11.9 CONTROLLED TYPE 2 DIABETES MELLITUS WITHOUT COMPLICATION, WITHOUT LONG-TERM CURRENT USE OF INSULIN: Primary | ICD-10-CM

## 2025-04-28 PROCEDURE — 3051F HG A1C>EQUAL 7.0%<8.0%: CPT | Mod: CPTII,,, | Performed by: FAMILY MEDICINE

## 2025-04-28 PROCEDURE — 99999 PR PBB SHADOW E&M-EST. PATIENT-LVL IV: CPT | Mod: PBBFAC,,, | Performed by: FAMILY MEDICINE

## 2025-04-28 PROCEDURE — 3074F SYST BP LT 130 MM HG: CPT | Mod: CPTII,,, | Performed by: FAMILY MEDICINE

## 2025-04-28 PROCEDURE — 3078F DIAST BP <80 MM HG: CPT | Mod: CPTII,,, | Performed by: FAMILY MEDICINE

## 2025-04-28 PROCEDURE — 99214 OFFICE O/P EST MOD 30 MIN: CPT | Mod: PBBFAC,PN | Performed by: FAMILY MEDICINE

## 2025-04-28 PROCEDURE — 99214 OFFICE O/P EST MOD 30 MIN: CPT | Mod: S$PBB,,, | Performed by: FAMILY MEDICINE

## 2025-04-28 PROCEDURE — 3008F BODY MASS INDEX DOCD: CPT | Mod: CPTII,,, | Performed by: FAMILY MEDICINE

## 2025-04-28 PROCEDURE — 1159F MED LIST DOCD IN RCRD: CPT | Mod: CPTII,,, | Performed by: FAMILY MEDICINE

## 2025-04-28 RX ORDER — MELOXICAM 7.5 MG/1
7.5 TABLET ORAL DAILY
Qty: 30 TABLET | Refills: 0 | Status: SHIPPED | OUTPATIENT
Start: 2025-04-28

## 2025-04-28 NOTE — PROGRESS NOTES
Chief Complaint:    Chief Complaint   Patient presents with    Follow-up       History of Present Illness:    History of Present Illness    Patient presents today for diabetes follow up. She reports improvement in blood sugar numbers and continues to exercise and walk regularly. She has iron deficiency anemia with low iron saturation and ferritin levels. B12 levels are also low. She is completing B12 injections, with the last injection scheduled for today. She is also taking oral B12 supplements. She is waiting to start iron pills until after completing required samples, with two more samples remaining to be collected. She experiences foot and back pain after prolonged standing at work, taking ibuprofen daily for management. She reports continued sleep difficulties despite current use of Lunesta and three OTC sleep medications. She has a history of single kidney. Recent heart monitor was normal. She denies any episodes of syncope. She has allergy to red dye.      ROS:  General: denies fever, denies chills, denies fatigue, denies weight gain, denies weight loss, denies loss of appetite  Eyes: denies vision changes, denies blurry vision, denies eye pain, denies eye discharge  ENT: denies ear pain, denies hearing loss, denies tinnitus, denies nasal congestion, denies sore throat  Cardiovascular: denies chest pain, denies palpitations, denies lower extremity edema  Respiratory: denies cough, denies shortness of breath, denies wheezing, denies sputum production  Endocrine: denies polyuria, denies polydipsia, denies heat intolerance, denies cold intolerance  Gastrointestinal: denies abdominal pain, denies heartburn, denies nausea, denies vomiting, denies diarrhea, denies constipation, denies blood in stool  Genitourinary: denies dysuria, denies urgency, denies frequency, denies hematuria, denies nocturia, denies incontinence  Heme & Lymphatic: denies easy or excessive bleeding, denies easy bruising, denies swollen lymph  nodes  Musculoskeletal: denies muscle pain, admits back pain, denies joint pain, denies joint swelling, admits limb pain  Skin: denies rash, denies lesion, denies itching, denies skin texture changes, denies skin color changes  Neurological: denies headache, denies dizziness, denies numbness, denies tingling, denies seizure activity, denies speech difficulty, denies memory loss, denies confusion , admits difficulty staying asleep, admits difficulty falling asleep  Psychiatric: denies anxiety, denies depression, denies sleep difficulty           Past Medical History:   Diagnosis Date    Spina bifida     Spina bifida        Social History:  Social History     Socioeconomic History    Marital status: Single   Tobacco Use    Smoking status: Never    Smokeless tobacco: Never   Substance and Sexual Activity    Alcohol use: Not Currently     Comment: Occaisonally    Drug use: Never    Sexual activity: Not Currently     Social Drivers of Health     Financial Resource Strain: Low Risk  (3/24/2025)    Overall Financial Resource Strain (CARDIA)     Difficulty of Paying Living Expenses: Not very hard   Food Insecurity: No Food Insecurity (3/24/2025)    Hunger Vital Sign     Worried About Running Out of Food in the Last Year: Never true     Ran Out of Food in the Last Year: Never true   Transportation Needs: No Transportation Needs (3/24/2025)    PRAPARE - Transportation     Lack of Transportation (Medical): No     Lack of Transportation (Non-Medical): No   Physical Activity: Insufficiently Active (3/24/2025)    Exercise Vital Sign     Days of Exercise per Week: 4 days     Minutes of Exercise per Session: 30 min   Stress: No Stress Concern Present (3/24/2025)    [employer] [place] [employer] - Occupational Stress Questionnaire     Feeling of Stress : Only a little   Housing Stability: Low Risk  (3/24/2025)    Housing Stability Vital Sign     Unable to Pay for Housing in the Last Year: No     Number of Times Moved in  "the Last Year: 0     Homeless in the Last Year: No       Family History:   family history includes Diabetes in her father and paternal grandmother; Graves' disease in her mother; Hyperlipidemia in her father and mother; Hypertension in her mother; Stroke in her paternal grandmother.    Health Maintenance   Topic Date Due    Diabetes Urine Screening  Never done    Foot Exam  Never done    Diabetic Eye Exam  Never done    Influenza Vaccine (1) 06/30/2025 (Originally 9/1/2024)    COVID-19 Vaccine (3 - 2024-25 season) 03/26/2026 (Originally 9/1/2024)    Pneumococcal Vaccines (Age 0-49) (1 of 2 - PCV) 03/26/2026 (Originally 9/18/2004)    Hemoglobin A1c  08/20/2025    Lipid Panel  02/20/2026    Cervical Cancer Screening  06/22/2027    TETANUS VACCINE  05/18/2032    RSV Vaccine (Age 60+ and Pregnant patients) (1 - 1-dose 75+ series) 09/18/2060    Hepatitis C Screening  Completed    HIV Screening  Completed       Exam:Physical     Vital Signs  Pulse: 96  SpO2: 96 %  BP: 126/76  Pain Score: 0-No pain  Height and Weight  Height: 4' 11" (149.9 cm)  Weight: 78.6 kg (173 lb 4.5 oz)  BSA (Calculated - sq m): 1.81 sq meters  BMI (Calculated): 35  Weight in (lb) to have BMI = 25: 123.5]    Body mass index is 35 kg/m².    Physical Exam    Vitals: Weight: 173 lbs.  General: Well-developed. Well-nourished. No acute distress.  Eyes: EOMI. Sclerae anicteric.  HENT: Normocephalic. Atraumatic. Nares patent. Moist oral mucosa.  Cardiovascular: Regular rate. Regular rhythm. No murmurs. No rubs. No gallops. Normal S1, S2.  Respiratory: Normal respiratory effort. Clear to auscultation bilaterally. No rales. No rhonchi. No wheezing.  Musculoskeletal: No  obvious deformity.  Extremities: No lower extremity edema.  Neurological: Alert & oriented x3. No slurred speech. Normal gait.  Psychiatric: Normal mood. Normal affect. Good insight. Good judgment.  Skin: Warm. Dry. No rash.           Assessment:        ICD-10-CM ICD-9-CM   1. Controlled type " 2 diabetes mellitus without complication, without long-term current use of insulin  E11.9 250.00   2. Low serum vitamin B12  E53.8 266.2   3. Anemia, unspecified type  D64.9 285.9   4. Insomnia, unspecified type  G47.00 780.52         Plan:    Assessment & Plan    MEDICAL DECISION MAKING:  - Reviewed glucose readings, noting improvement since starting Metformin.  - Assessed iron deficiency anemia and B12 deficiency, noting completion of B12 injections; continued B12 supplementation orally.  - Evaluated sleep issues and discontinued Lunesta; discussed cognitive behavioral therapy (CBT) as a non-pharmacological approach for insomnia.  - Addressed chronic pain management, weighing risks of NSAIDs given single kidney; started Mobic as needed for pain to replace daily ibuprofen use.    ACTION ITEMS/LIFESTYLE:  - Patient to take a 15-minute walk after eating to further decrease glucose levels.  - Patient to implement cognitive behavioral therapy techniques for sleep management instead of relying on sleep medication.  - Patient to limit use of anti-inflammatory medications due to single kidney status.  - Educated on pre-meal salad with olive oil and apple cider vinegar to help lower glucose spikes.    MEDICATIONS:  - Initiated GLP-1 receptor agonist (Mounjaro) from compounding pharmacy for better glycemic control and potential weight loss; explained potential for hypoglycemia and need to adjust oral medications.  - Continued Metformin/Glipizide combination pill, with instructions to monitor glucose and potentially discontinue if levels drop too low after starting Mounjaro.  - Started Mobic as needed for pain to replace daily ibuprofen use.  - Discontinued Lunesta.  - Continued B12 supplementation orally.    FOLLOW UP:  - Follow up in a few months to assess progress.         Addis was seen today for follow-up.    Diagnoses and all orders for this visit:    Controlled type 2 diabetes mellitus without complication, without  long-term current use of insulin    Low serum vitamin B12    Anemia, unspecified type    Insomnia, unspecified type    Other orders  -     meloxicam (MOBIC) 7.5 MG tablet; Take 1 tablet (7.5 mg total) by mouth once daily.        Follow up in about 2 months (around 6/28/2025), or if symptoms worsen or fail to improve.      Madie Horn MD

## 2025-05-08 DIAGNOSIS — G47.00 INSOMNIA, UNSPECIFIED TYPE: ICD-10-CM

## 2025-05-08 RX ORDER — ESZOPICLONE 2 MG/1
2 TABLET, FILM COATED ORAL NIGHTLY
Qty: 30 TABLET | Refills: 3 | Status: SHIPPED | OUTPATIENT
Start: 2025-05-08

## 2025-05-08 NOTE — TELEPHONE ENCOUNTER
No care due was identified.  [facility] Embedded Care Due Messages. Reference number: [id number].   5/08/2025 8:56:18 AM CDT

## 2025-05-19 ENCOUNTER — TELEPHONE (OUTPATIENT)
Dept: FAMILY MEDICINE | Facility: CLINIC | Age: 40
End: 2025-05-19
Payer: MEDICAID

## 2025-05-19 DIAGNOSIS — D64.9 ANEMIA, UNSPECIFIED TYPE: Primary | ICD-10-CM

## 2025-05-20 PROCEDURE — 82272 OCCULT BLD FECES 1-3 TESTS: CPT | Performed by: FAMILY MEDICINE

## 2025-05-21 ENCOUNTER — RESULTS FOLLOW-UP (OUTPATIENT)
Dept: FAMILY MEDICINE | Facility: CLINIC | Age: 40
End: 2025-05-21

## 2025-05-21 DIAGNOSIS — E11.9 TYPE 2 DIABETES MELLITUS WITHOUT COMPLICATION: ICD-10-CM

## 2025-05-22 RX ORDER — KETOCONAZOLE 20 MG/G
CREAM TOPICAL DAILY
Qty: 60 G | Refills: 1 | Status: SHIPPED | OUTPATIENT
Start: 2025-05-22

## 2025-05-22 RX ORDER — TRIAMCINOLONE ACETONIDE 1 MG/G
OINTMENT TOPICAL 2 TIMES DAILY
Qty: 80 G | Refills: 1 | Status: SHIPPED | OUTPATIENT
Start: 2025-05-22

## 2025-05-22 NOTE — TELEPHONE ENCOUNTER
Refill Routing Note   Medication(s) are not appropriate for processing by [facility] for the following reason(s):        Outside of protocol    [facility] action(s):  Approve  Route             Appointments  past 12m or future 3m with PCP    Date Provider   Last Visit   4/28/2025 Madie Horn MD   Next Visit   6/5/2025 Madie Horn MD   ED visits in past 90 days: 0        Note composed:9:28 AM 05/22/2025

## 2025-05-22 NOTE — TELEPHONE ENCOUNTER
No care due was identified.  [facility] Embedded Care Due Messages. Reference number: [id number].   5/21/2025 8:08:58 PM CDT

## 2025-05-26 DIAGNOSIS — D64.9 ANEMIA, UNSPECIFIED TYPE: Primary | ICD-10-CM

## 2025-05-26 RX ORDER — FERROUS GLUCONATE 324(38)MG
324 TABLET ORAL
Qty: 90 TABLET | Refills: 3 | Status: SHIPPED | OUTPATIENT
Start: 2025-05-26

## 2025-05-26 NOTE — TELEPHONE ENCOUNTER
----- Message from Madie Horn MD sent at 5/25/2025  5:43 PM CDT -----  Occult blood x1 is negative in stool,     please send ferrous gluconate 325 mg b.i.d. to the pharmacy  ----- Message -----  From: Lab, Background User  Sent: 5/21/2025   3:46 AM CDT  To: Madie Horn MD

## 2025-05-27 ENCOUNTER — PATIENT OUTREACH (OUTPATIENT)
Dept: ADMINISTRATIVE | Facility: HOSPITAL | Age: 40
End: 2025-05-27
Payer: MEDICAID

## 2025-05-29 ENCOUNTER — LAB VISIT (OUTPATIENT)
Dept: LAB | Facility: HOSPITAL | Age: 40
End: 2025-05-29
Attending: FAMILY MEDICINE
Payer: MEDICAID

## 2025-05-29 DIAGNOSIS — E11.9 TYPE 2 DIABETES MELLITUS WITHOUT COMPLICATION: ICD-10-CM

## 2025-05-29 DIAGNOSIS — E53.8 LOW SERUM VITAMIN B12: ICD-10-CM

## 2025-05-29 DIAGNOSIS — F51.04 CHRONIC INSOMNIA: ICD-10-CM

## 2025-05-29 DIAGNOSIS — E11.9 DIABETES MELLITUS, NEW ONSET: ICD-10-CM

## 2025-05-29 DIAGNOSIS — I10 PRIMARY HYPERTENSION: ICD-10-CM

## 2025-05-29 LAB
ABSOLUTE EOSINOPHIL (OHS): 0.18 K/UL
ABSOLUTE MONOCYTE (OHS): 0.75 K/UL (ref 0.3–1)
ABSOLUTE NEUTROPHIL COUNT (OHS): 9.41 K/UL (ref 1.8–7.7)
ALBUMIN SERPL BCP-MCNC: 3.2 G/DL (ref 3.5–5.2)
ALBUMIN/CREAT UR: 73.9 UG/MG
ALP SERPL-CCNC: 84 UNIT/L (ref 40–150)
ALT SERPL W/O P-5'-P-CCNC: 18 UNIT/L (ref 10–44)
ANION GAP (OHS): 11 MMOL/L (ref 8–16)
AST SERPL-CCNC: 13 UNIT/L (ref 11–45)
BASOPHILS # BLD AUTO: 0.08 K/UL
BASOPHILS NFR BLD AUTO: 0.6 %
BILIRUB SERPL-MCNC: 0.4 MG/DL (ref 0.1–1)
BUN SERPL-MCNC: 18 MG/DL (ref 6–20)
CALCIUM SERPL-MCNC: 9.1 MG/DL (ref 8.7–10.5)
CHLORIDE SERPL-SCNC: 109 MMOL/L (ref 95–110)
CHOLEST SERPL-MCNC: 183 MG/DL (ref 120–199)
CHOLEST/HDLC SERPL: 3.2 {RATIO} (ref 2–5)
CO2 SERPL-SCNC: 21 MMOL/L (ref 23–29)
CREAT SERPL-MCNC: 1.2 MG/DL (ref 0.5–1.4)
CREAT UR-MCNC: 92 MG/DL (ref 15–325)
EAG (OHS): 117 MG/DL (ref 68–131)
ERYTHROCYTE [DISTWIDTH] IN BLOOD BY AUTOMATED COUNT: 14.3 % (ref 11.5–14.5)
FERRITIN SERPL-MCNC: 40 NG/ML (ref 20–300)
FOLATE SERPL-MCNC: 15.9 NG/ML (ref 4–24)
GFR SERPLBLD CREATININE-BSD FMLA CKD-EPI: 59 ML/MIN/1.73/M2
GLUCOSE SERPL-MCNC: 131 MG/DL (ref 70–110)
HBA1C MFR BLD: 5.7 % (ref 4–5.6)
HCT VFR BLD AUTO: 32.7 % (ref 37–48.5)
HDLC SERPL-MCNC: 57 MG/DL (ref 40–75)
HDLC SERPL: 31.1 % (ref 20–50)
HGB BLD-MCNC: 9.5 GM/DL (ref 12–16)
IMM GRANULOCYTES # BLD AUTO: 0.07 K/UL (ref 0–0.04)
IMM GRANULOCYTES NFR BLD AUTO: 0.5 % (ref 0–0.5)
IRON SATN MFR SERPL: 14 % (ref 20–50)
IRON SERPL-MCNC: 58 UG/DL (ref 30–160)
LDLC SERPL CALC-MCNC: 104 MG/DL (ref 63–159)
LYMPHOCYTES # BLD AUTO: 2.34 K/UL (ref 1–4.8)
MCH RBC QN AUTO: 26.4 PG (ref 27–31)
MCHC RBC AUTO-ENTMCNC: 29.1 G/DL (ref 32–36)
MCV RBC AUTO: 91 FL (ref 82–98)
MICROALBUMIN UR-MCNC: 68 UG/ML (ref ?–5000)
NONHDLC SERPL-MCNC: 126 MG/DL
NUCLEATED RBC (/100WBC) (OHS): 0 /100 WBC
PLATELET # BLD AUTO: 409 K/UL (ref 150–450)
PMV BLD AUTO: 10 FL (ref 9.2–12.9)
POTASSIUM SERPL-SCNC: 4.3 MMOL/L (ref 3.5–5.1)
PROT SERPL-MCNC: 7.2 GM/DL (ref 6–8.4)
RBC # BLD AUTO: 3.6 M/UL (ref 4–5.4)
RELATIVE EOSINOPHIL (OHS): 1.4 %
RELATIVE LYMPHOCYTE (OHS): 18.2 % (ref 18–48)
RELATIVE MONOCYTE (OHS): 5.8 % (ref 4–15)
RELATIVE NEUTROPHIL (OHS): 73.5 % (ref 38–73)
SODIUM SERPL-SCNC: 141 MMOL/L (ref 136–145)
TIBC SERPL-MCNC: 411 UG/DL (ref 250–450)
TRANSFERRIN SERPL-MCNC: 278 MG/DL (ref 200–375)
TRIGL SERPL-MCNC: 110 MG/DL (ref 30–150)
VIT B12 SERPL-MCNC: 1501 PG/ML (ref 210–950)
WBC # BLD AUTO: 12.83 K/UL (ref 3.9–12.7)

## 2025-05-29 PROCEDURE — 80061 LIPID PANEL: CPT

## 2025-05-29 PROCEDURE — 83036 HEMOGLOBIN GLYCOSYLATED A1C: CPT

## 2025-05-29 PROCEDURE — 82728 ASSAY OF FERRITIN: CPT

## 2025-05-29 PROCEDURE — 80053 COMPREHEN METABOLIC PANEL: CPT

## 2025-05-29 PROCEDURE — 82746 ASSAY OF FOLIC ACID SERUM: CPT

## 2025-05-29 PROCEDURE — 85025 COMPLETE CBC W/AUTO DIFF WBC: CPT

## 2025-05-29 PROCEDURE — 82043 UR ALBUMIN QUANTITATIVE: CPT

## 2025-05-29 PROCEDURE — 83540 ASSAY OF IRON: CPT

## 2025-05-29 PROCEDURE — 36415 COLL VENOUS BLD VENIPUNCTURE: CPT | Mod: PN

## 2025-05-29 PROCEDURE — 82607 VITAMIN B-12: CPT

## 2025-06-03 RX ORDER — VALSARTAN AND HYDROCHLOROTHIAZIDE 80; 12.5 MG/1; MG/1
1 TABLET, FILM COATED ORAL
Qty: 90 TABLET | Refills: 3 | Status: SHIPPED | OUTPATIENT
Start: 2025-06-03

## 2025-06-05 ENCOUNTER — OFFICE VISIT (OUTPATIENT)
Dept: FAMILY MEDICINE | Facility: CLINIC | Age: 40
End: 2025-06-05
Payer: MEDICAID

## 2025-06-05 ENCOUNTER — PATIENT MESSAGE (OUTPATIENT)
Dept: FAMILY MEDICINE | Facility: CLINIC | Age: 40
End: 2025-06-05

## 2025-06-05 VITALS
SYSTOLIC BLOOD PRESSURE: 130 MMHG | HEART RATE: 93 BPM | DIASTOLIC BLOOD PRESSURE: 74 MMHG | WEIGHT: 168.44 LBS | HEIGHT: 59 IN | OXYGEN SATURATION: 98 % | BODY MASS INDEX: 33.96 KG/M2

## 2025-06-05 DIAGNOSIS — I10 PRIMARY HYPERTENSION: ICD-10-CM

## 2025-06-05 DIAGNOSIS — D64.9 CHRONIC ANEMIA: ICD-10-CM

## 2025-06-05 DIAGNOSIS — Q60.0 CONGENITALLY SOLITARY RIGHT KIDNEY: ICD-10-CM

## 2025-06-05 DIAGNOSIS — K14.0 TONGUE ULCER: ICD-10-CM

## 2025-06-05 DIAGNOSIS — E11.9 CONTROLLED TYPE 2 DIABETES MELLITUS WITHOUT COMPLICATION, WITHOUT LONG-TERM CURRENT USE OF INSULIN: Primary | ICD-10-CM

## 2025-06-05 PROCEDURE — 99214 OFFICE O/P EST MOD 30 MIN: CPT | Mod: PBBFAC,PN | Performed by: FAMILY MEDICINE

## 2025-06-05 PROCEDURE — 3066F NEPHROPATHY DOC TX: CPT | Mod: CPTII,,, | Performed by: FAMILY MEDICINE

## 2025-06-05 PROCEDURE — 99214 OFFICE O/P EST MOD 30 MIN: CPT | Mod: S$PBB,,, | Performed by: FAMILY MEDICINE

## 2025-06-05 PROCEDURE — G2211 COMPLEX E/M VISIT ADD ON: HCPCS | Mod: ,,, | Performed by: FAMILY MEDICINE

## 2025-06-05 PROCEDURE — 3060F POS MICROALBUMINURIA REV: CPT | Mod: CPTII,,, | Performed by: FAMILY MEDICINE

## 2025-06-05 PROCEDURE — 1159F MED LIST DOCD IN RCRD: CPT | Mod: CPTII,,, | Performed by: FAMILY MEDICINE

## 2025-06-05 PROCEDURE — 99999 PR PBB SHADOW E&M-EST. PATIENT-LVL IV: CPT | Mod: PBBFAC,,, | Performed by: FAMILY MEDICINE

## 2025-06-05 PROCEDURE — 3008F BODY MASS INDEX DOCD: CPT | Mod: CPTII,,, | Performed by: FAMILY MEDICINE

## 2025-06-05 PROCEDURE — 3078F DIAST BP <80 MM HG: CPT | Mod: CPTII,,, | Performed by: FAMILY MEDICINE

## 2025-06-05 PROCEDURE — 3075F SYST BP GE 130 - 139MM HG: CPT | Mod: CPTII,,, | Performed by: FAMILY MEDICINE

## 2025-06-05 PROCEDURE — 3044F HG A1C LEVEL LT 7.0%: CPT | Mod: CPTII,,, | Performed by: FAMILY MEDICINE

## 2025-06-05 RX ORDER — VALACYCLOVIR HYDROCHLORIDE 1 G/1
1000 TABLET, FILM COATED ORAL 2 TIMES DAILY
Qty: 10 TABLET | Refills: 3 | Status: SHIPPED | OUTPATIENT
Start: 2025-06-05 | End: 2025-06-10

## 2025-06-06 ENCOUNTER — PATIENT MESSAGE (OUTPATIENT)
Dept: HEMATOLOGY/ONCOLOGY | Facility: CLINIC | Age: 40
End: 2025-06-06
Payer: MEDICAID

## 2025-06-09 NOTE — PROGRESS NOTES
Chief Complaint:    Chief Complaint   Patient presents with    Follow-up       History of Present Illness:    History of Present Illness    Patient presents today for follow up. She reports significant weight loss progress from 182 lbs to 165 lbs. She has improved dietary habits with increased water intake and consumption of zero-sugar Gatorade. She continues to experience insomnia despite attempting CBT as recommended at previous visit. She reports a tongue ulcer present for approximately one week with pain on contact. She has completed 16 B12 injections and started iron supplementation one week ago. She takes daily cranberry pills for bladder infection prevention. She reports regular, non-heavy menstrual periods. A1C improved from 7.1 to 5.7. Cholesterol decreased to 183. Creatinine is slightly elevated at 1.2 in this single kidney patient. She remains anemic despite improving iron stores. B12 levels have normalized. Urinalysis was normal.      ROS:  General: denies fever, denies chills, denies fatigue, denies weight gain, denies weight loss, denies loss of appetite  Eyes: denies vision changes, denies blurry vision, denies eye pain, denies eye discharge  ENT: denies ear pain, denies hearing loss, denies tinnitus, denies nasal congestion, denies sore throat, admits tongue pain, admits mouth lesions  Cardiovascular: denies chest pain, denies palpitations, denies lower extremity edema  Respiratory: denies cough, denies shortness of breath, denies wheezing, denies sputum production  Endocrine: denies polyuria, denies polydipsia, denies heat intolerance, denies cold intolerance  Gastrointestinal: denies abdominal pain, denies heartburn, denies nausea, denies vomiting, denies diarrhea, denies constipation, denies blood in stool  Genitourinary: denies dysuria, denies urgency, denies frequency, denies hematuria, denies nocturia, denies incontinence  Heme & Lymphatic: denies easy or excessive bleeding, denies easy  bruising, denies swollen lymph nodes  Musculoskeletal: denies muscle pain, denies back pain, denies joint pain, denies joint swelling  Skin: denies rash, denies lesion, denies itching, denies skin texture changes, denies skin color changes  Neurological: denies headache, denies dizziness, denies numbness, denies tingling, denies seizure activity, denies speech difficulty, denies memory loss, denies confusion  Psychiatric: denies anxiety, denies depression, admits sleep difficulty           Past Medical History:   Diagnosis Date    Spina bifida     Spina bifida        Social History:  Social History     Socioeconomic History    Marital status: Single   Tobacco Use    Smoking status: Never    Smokeless tobacco: Never   Substance and Sexual Activity    Alcohol use: Not Currently     Comment: Occaisonally    Drug use: Never    Sexual activity: Not Currently     Social Drivers of Health     Financial Resource Strain: Low Risk  (3/24/2025)    Overall Financial Resource Strain (CARDIA)     Difficulty of Paying Living Expenses: Not very hard   Food Insecurity: No Food Insecurity (3/24/2025)    Hunger Vital Sign     Worried About Running Out of Food in the Last Year: Never true     Ran Out of Food in the Last Year: Never true   Transportation Needs: No Transportation Needs (3/24/2025)    PRAPARE - Transportation     Lack of Transportation (Medical): No     Lack of Transportation (Non-Medical): No   Physical Activity: Insufficiently Active (3/24/2025)    Exercise Vital Sign     Days of Exercise per Week: 4 days     Minutes of Exercise per Session: 30 min   Stress: No Stress Concern Present (3/24/2025)    [employer] [place] [employer] - Occupational Stress Questionnaire     Feeling of Stress : Only a little   Housing Stability: Low Risk  (3/24/2025)    Housing Stability Vital Sign     Unable to Pay for Housing in the Last Year: No     Number of Times Moved in the Last Year: 0     Homeless in the Last Year: No  "      Family History:   family history includes Diabetes in her father and paternal grandmother; Graves' disease in her mother; Hyperlipidemia in her father and mother; Hypertension in her mother; Stroke in her paternal grandmother.    Health Maintenance   Topic Date Due    Foot Exam  Never done    Diabetic Eye Exam  Never done    COVID-19 Vaccine (3 - 2024-25 season) 03/26/2026 (Originally 9/1/2024)    Pneumococcal Vaccines (Age 0-49) (1 of 2 - PCV) 03/26/2026 (Originally 9/18/2004)    Influenza Vaccine (Season Ended) 09/01/2025    Hemoglobin A1c  11/29/2025    Diabetes Urine Screening  05/29/2026    Lipid Panel  05/29/2026    Cervical Cancer Screening  06/22/2027    TETANUS VACCINE  05/18/2032    RSV Vaccine (Age 60+ and Pregnant patients) (1 - 1-dose 75+ series) 09/18/2060    Hepatitis C Screening  Completed    HIV Screening  Completed       Exam:Physical     Vital Signs  Pulse: 93  SpO2: 98 %  BP: 130/74  Pain Score: 0-No pain  Height and Weight  Height: 4' 11" (149.9 cm)  Weight: 76.4 kg (168 lb 6.9 oz)  BSA (Calculated - sq m): 1.78 sq meters  BMI (Calculated): 34  Weight in (lb) to have BMI = 25: 123.5]    Body mass index is 34.02 kg/m².    Physical Exam    General: Well-developed. Well-nourished. No acute distress.  Eyes: EOMI. Sclerae anicteric.  HENT: Normocephalic. Atraumatic. Nares patent. Moist oral mucosa.  Small lesion on the tongue  Cardiovascular: Regular rate. Regular rhythm. No murmurs. No rubs. No gallops. Normal S1, S2.  Respiratory: Normal respiratory effort. Clear to auscultation bilaterally. No rales. No rhonchi. No wheezing.  Musculoskeletal: No  obvious deformity.  Extremities: No lower extremity edema.  Neurological: Alert & oriented x3. No slurred speech. Normal gait.  Psychiatric: Normal mood. Normal affect. Good insight. Good judgment.  Skin: Warm. Dry. No rash.           Assessment:        ICD-10-CM ICD-9-CM   1. Controlled type 2 diabetes mellitus without complication, without " long-term current use of insulin  E11.9 250.00   2. Congenitally solitary right kidney  Q60.0 753.0   3. Primary hypertension  I10 401.9   4. Chronic anemia  D64.9 285.9   5. Tongue ulcer  K14.0 529.0         Plan:    Assessment & Plan    MEDICAL DECISION MAKING:  - Reviewed recent lab results, noting significant improvements in A1C (from 7.1 to 5.7) and cholesterol levels.  - Assessed ongoing anemia despite improvement in iron stores, requiring hematology consult.  - Evaluated tongue ulcer and started medication to be taken for 4-5 days, plus OTC Abreva for topical application.  - Determined AZO ineffective for UTI prevention and considered discontinuation.    PATIENT EDUCATION:  - Explained components of B complex supplements and their potential to replace individual B12 and folic acid supplementation.    ACTION ITEMS/LIFESTYLE:  - Continue with current diet and hydration improvements.  - Maintain weight loss efforts.  - Drink cranberry juice or continue taking cranberry pills for bladder health.    MEDICATIONS:  - Started B-complex tablet, 1 daily.  - Evaluated tongue ulcer and started Valtrex to be taken for 4-5 days, plus OTC Abreva for topical application.  If no improvement please let us know    REFERRALS:  - Referred to hematology specialist for further evaluation of persistent anemia.    FOLLOW UP:  - Follow up in a few months to reassess weight loss progress and overall health improvements.         Addis was seen today for follow-up.    Diagnoses and all orders for this visit:    Controlled type 2 diabetes mellitus without complication, without long-term current use of insulin  -     Hemoglobin A1C; Future  -     Comprehensive Metabolic Panel; Future  -     CBC Auto Differential; Future  -     Microalbumin/Creatinine Ratio, Urine; Future  -     Lipid Panel; Future    Congenitally solitary right kidney    Primary hypertension    Chronic anemia  -     Ambulatory referral/consult to Hematology / Oncology;  Future    Tongue ulcer    Other orders  -     valACYclovir (VALTREX) 1000 MG tablet; Take 1 tablet (1,000 mg total) by mouth 2 (two) times daily. for 5 days        Follow up in about 3 months (around 9/5/2025), or if symptoms worsen or fail to improve.      Madie Horn MD

## 2025-06-12 ENCOUNTER — PATIENT MESSAGE (OUTPATIENT)
Dept: FAMILY MEDICINE | Facility: CLINIC | Age: 40
End: 2025-06-12
Payer: MEDICAID

## 2025-07-01 ENCOUNTER — PATIENT MESSAGE (OUTPATIENT)
Dept: FAMILY MEDICINE | Facility: CLINIC | Age: 40
End: 2025-07-01
Payer: MEDICAID

## 2025-07-01 DIAGNOSIS — E11.9 CONTROLLED TYPE 2 DIABETES MELLITUS WITHOUT COMPLICATION, WITHOUT LONG-TERM CURRENT USE OF INSULIN: Primary | ICD-10-CM

## 2025-07-14 DIAGNOSIS — E11.9 CONTROLLED TYPE 2 DIABETES MELLITUS WITHOUT COMPLICATION, WITHOUT LONG-TERM CURRENT USE OF INSULIN: Primary | ICD-10-CM

## 2025-07-14 NOTE — TELEPHONE ENCOUNTER
----- Message from Rita sent at 7/14/2025  1:54 PM CDT -----  She is wondering if her rx can be changed back to the 5 because the 15 is to expensive.

## 2025-07-14 NOTE — TELEPHONE ENCOUNTER
No care due was identified.  [facility] Embedded Care Due Messages. Reference number: [id number].   7/14/2025 5:38:19 PM CDT

## 2025-08-11 ENCOUNTER — RESEARCH ENCOUNTER (OUTPATIENT)
Dept: RESEARCH | Facility: HOSPITAL | Age: 40
End: 2025-08-11
Payer: MEDICAID

## 2025-08-15 ENCOUNTER — HOSPITAL ENCOUNTER (EMERGENCY)
Facility: HOSPITAL | Age: 40
Discharge: HOME | End: 2025-08-15
Attending: EMERGENCY MEDICINE
Payer: MEDICAID

## 2025-08-15 VITALS
WEIGHT: 152 LBS | HEART RATE: 119 BPM | TEMPERATURE: 98 F | SYSTOLIC BLOOD PRESSURE: 126 MMHG | HEIGHT: 59 IN | RESPIRATION RATE: 19 BRPM | OXYGEN SATURATION: 97 % | DIASTOLIC BLOOD PRESSURE: 63 MMHG | BODY MASS INDEX: 30.64 KG/M2

## 2025-08-15 DIAGNOSIS — R55 SYNCOPE: ICD-10-CM

## 2025-08-15 DIAGNOSIS — N39.0 URINARY TRACT INFECTION WITHOUT HEMATURIA, SITE UNSPECIFIED: Primary | ICD-10-CM

## 2025-08-15 DIAGNOSIS — R07.9 CHEST PAIN: ICD-10-CM

## 2025-08-15 LAB
ABSOLUTE EOSINOPHIL (OHS): 0 K/UL
ABSOLUTE EOSINOPHIL (OHS): 0 K/UL
ABSOLUTE MONOCYTE (OHS): 1.4 K/UL (ref 0.3–1)
ABSOLUTE MONOCYTE (OHS): 1.5 K/UL (ref 0.3–1)
ABSOLUTE NEUTROPHIL COUNT (OHS): 15.57 K/UL (ref 1.8–7.7)
ABSOLUTE NEUTROPHIL COUNT (OHS): 18.51 K/UL (ref 1.8–7.7)
ALBUMIN SERPL BCP-MCNC: 3.7 G/DL (ref 3.5–5.2)
ALP SERPL-CCNC: 115 UNIT/L (ref 40–150)
ALT SERPL W/O P-5'-P-CCNC: 56 UNIT/L (ref 10–44)
ANION GAP (OHS): 13 MMOL/L (ref 8–16)
AST SERPL-CCNC: 25 UNIT/L (ref 11–45)
BACTERIA #/AREA URNS AUTO: ABNORMAL /HPF
BASOPHILS # BLD AUTO: 0.05 K/UL
BASOPHILS # BLD AUTO: 0.06 K/UL
BASOPHILS NFR BLD AUTO: 0.2 %
BASOPHILS NFR BLD AUTO: 0.3 %
BILIRUB SERPL-MCNC: 0.7 MG/DL (ref 0.1–1)
BILIRUB UR QL STRIP.AUTO: NEGATIVE
BUN SERPL-MCNC: 18 MG/DL (ref 6–20)
CALCIUM SERPL-MCNC: 9.7 MG/DL (ref 8.7–10.5)
CHLORIDE SERPL-SCNC: 100 MMOL/L (ref 95–110)
CLARITY UR: ABNORMAL
CO2 SERPL-SCNC: 23 MMOL/L (ref 23–29)
COLOR UR AUTO: YELLOW
CREAT SERPL-MCNC: 1.3 MG/DL (ref 0.5–1.4)
ERYTHROCYTE [DISTWIDTH] IN BLOOD BY AUTOMATED COUNT: 15.3 % (ref 11.5–14.5)
ERYTHROCYTE [DISTWIDTH] IN BLOOD BY AUTOMATED COUNT: 15.5 % (ref 11.5–14.5)
GFR SERPLBLD CREATININE-BSD FMLA CKD-EPI: 54 ML/MIN/1.73/M2
GLUCOSE SERPL-MCNC: 136 MG/DL (ref 70–110)
GLUCOSE UR QL STRIP: NEGATIVE
HCT VFR BLD AUTO: 32.8 % (ref 37–48.5)
HCT VFR BLD AUTO: 35.2 % (ref 37–48.5)
HCV AB SERPL QL IA: NORMAL
HGB BLD-MCNC: 10.3 GM/DL (ref 12–16)
HGB BLD-MCNC: 11.1 GM/DL (ref 12–16)
HGB UR QL STRIP: ABNORMAL
HIV 1+2 AB+HIV1 P24 AG SERPL QL IA: NORMAL
HYALINE CASTS UR QL AUTO: 0 /LPF (ref 0–1)
IMM GRANULOCYTES # BLD AUTO: 0.09 K/UL (ref 0–0.04)
IMM GRANULOCYTES # BLD AUTO: 0.15 K/UL (ref 0–0.04)
IMM GRANULOCYTES NFR BLD AUTO: 0.5 % (ref 0–0.5)
IMM GRANULOCYTES NFR BLD AUTO: 0.7 % (ref 0–0.5)
KETONES UR QL STRIP: NEGATIVE
LEUKOCYTE ESTERASE UR QL STRIP: ABNORMAL
LYMPHOCYTES # BLD AUTO: 1.61 K/UL (ref 1–4.8)
LYMPHOCYTES # BLD AUTO: 1.83 K/UL (ref 1–4.8)
MCH RBC QN AUTO: 27.3 PG (ref 27–31)
MCH RBC QN AUTO: 27.5 PG (ref 27–31)
MCHC RBC AUTO-ENTMCNC: 31.4 G/DL (ref 32–36)
MCHC RBC AUTO-ENTMCNC: 31.5 G/DL (ref 32–36)
MCV RBC AUTO: 87 FL (ref 82–98)
MCV RBC AUTO: 88 FL (ref 82–98)
MICROSCOPIC COMMENT: ABNORMAL
NITRITE UR QL STRIP: NEGATIVE
NT-PROBNP SERPL-MCNC: 218 PG/ML
NUCLEATED RBC (/100WBC) (OHS): 0 /100 WBC
NUCLEATED RBC (/100WBC) (OHS): 0 /100 WBC
PH UR STRIP: 6 [PH]
PLATELET # BLD AUTO: 293 K/UL (ref 150–450)
PLATELET # BLD AUTO: 329 K/UL (ref 150–450)
PMV BLD AUTO: 9.7 FL (ref 9.2–12.9)
PMV BLD AUTO: 9.7 FL (ref 9.2–12.9)
POTASSIUM SERPL-SCNC: 3 MMOL/L (ref 3.5–5.1)
PROT SERPL-MCNC: 8.4 GM/DL (ref 6–8.4)
PROT UR QL STRIP: ABNORMAL
RBC # BLD AUTO: 3.74 M/UL (ref 4–5.4)
RBC # BLD AUTO: 4.07 M/UL (ref 4–5.4)
RBC #/AREA URNS AUTO: 14 /HPF (ref 0–4)
RELATIVE EOSINOPHIL (OHS): 0 %
RELATIVE EOSINOPHIL (OHS): 0 %
RELATIVE LYMPHOCYTE (OHS): 7.4 % (ref 18–48)
RELATIVE LYMPHOCYTE (OHS): 9.6 % (ref 18–48)
RELATIVE MONOCYTE (OHS): 6.4 % (ref 4–15)
RELATIVE MONOCYTE (OHS): 7.9 % (ref 4–15)
RELATIVE NEUTROPHIL (OHS): 81.7 % (ref 38–73)
RELATIVE NEUTROPHIL (OHS): 85.3 % (ref 38–73)
SODIUM SERPL-SCNC: 136 MMOL/L (ref 136–145)
SP GR UR STRIP: 1.01
TROPONIN I SERPL HS-MCNC: <3 NG/L
TROPONIN I SERPL HS-MCNC: <3 NG/L
UROBILINOGEN UR STRIP-ACNC: NEGATIVE EU/DL
WBC # BLD AUTO: 19.05 K/UL (ref 3.9–12.7)
WBC # BLD AUTO: 21.72 K/UL (ref 3.9–12.7)
WBC #/AREA URNS AUTO: >100 /HPF (ref 0–5)
WBC CLUMPS UR QL AUTO: ABNORMAL

## 2025-08-15 PROCEDURE — 87389 HIV-1 AG W/HIV-1&-2 AB AG IA: CPT | Performed by: EMERGENCY MEDICINE

## 2025-08-15 PROCEDURE — 87088 URINE BACTERIA CULTURE: CPT | Performed by: EMERGENCY MEDICINE

## 2025-08-15 PROCEDURE — 96360 HYDRATION IV INFUSION INIT: CPT

## 2025-08-15 PROCEDURE — 81003 URINALYSIS AUTO W/O SCOPE: CPT | Performed by: EMERGENCY MEDICINE

## 2025-08-15 PROCEDURE — 80053 COMPREHEN METABOLIC PANEL: CPT | Performed by: EMERGENCY MEDICINE

## 2025-08-15 PROCEDURE — 84484 ASSAY OF TROPONIN QUANT: CPT | Performed by: EMERGENCY MEDICINE

## 2025-08-15 PROCEDURE — 83880 ASSAY OF NATRIURETIC PEPTIDE: CPT | Performed by: EMERGENCY MEDICINE

## 2025-08-15 PROCEDURE — 93010 ELECTROCARDIOGRAM REPORT: CPT | Mod: ,,, | Performed by: INTERNAL MEDICINE

## 2025-08-15 PROCEDURE — 25000003 PHARM REV CODE 250: Performed by: EMERGENCY MEDICINE

## 2025-08-15 PROCEDURE — 86803 HEPATITIS C AB TEST: CPT | Performed by: EMERGENCY MEDICINE

## 2025-08-15 PROCEDURE — 99285 EMERGENCY DEPT VISIT HI MDM: CPT | Mod: 25

## 2025-08-15 PROCEDURE — 93005 ELECTROCARDIOGRAM TRACING: CPT

## 2025-08-15 PROCEDURE — 85025 COMPLETE CBC W/AUTO DIFF WBC: CPT | Performed by: EMERGENCY MEDICINE

## 2025-08-15 RX ORDER — LEVOFLOXACIN 750 MG/1
750 TABLET, FILM COATED ORAL
Status: COMPLETED | OUTPATIENT
Start: 2025-08-15 | End: 2025-08-15

## 2025-08-15 RX ORDER — LEVOFLOXACIN 500 MG/1
500 TABLET, FILM COATED ORAL DAILY
Qty: 7 TABLET | Refills: 0 | Status: SHIPPED | OUTPATIENT
Start: 2025-08-15 | End: 2025-08-22

## 2025-08-15 RX ADMIN — LEVOFLOXACIN 750 MG: 750 TABLET, FILM COATED ORAL at 04:08

## 2025-08-15 RX ADMIN — SODIUM CHLORIDE 1000 ML: 9 INJECTION, SOLUTION INTRAVENOUS at 10:08

## 2025-08-15 RX ADMIN — POTASSIUM BICARBONATE 40 MEQ: 391 TABLET, EFFERVESCENT ORAL at 02:08

## 2025-08-16 LAB
HOLD SPECIMEN: NORMAL
OHS QRS DURATION: 80 MS
OHS QTC CALCULATION: 447 MS

## 2025-08-18 LAB
BACTERIA UR CULT: ABNORMAL
HOLD SPECIMEN: NORMAL

## 2025-08-19 ENCOUNTER — PATIENT MESSAGE (OUTPATIENT)
Dept: ADMINISTRATIVE | Facility: HOSPITAL | Age: 40
End: 2025-08-19
Payer: MEDICAID

## 2025-08-20 ENCOUNTER — OFFICE VISIT (OUTPATIENT)
Dept: FAMILY MEDICINE | Facility: CLINIC | Age: 40
End: 2025-08-20
Payer: MEDICAID

## 2025-08-20 DIAGNOSIS — I95.9 HYPOTENSION, UNSPECIFIED HYPOTENSION TYPE: ICD-10-CM

## 2025-08-20 DIAGNOSIS — D64.9 CHRONIC ANEMIA: ICD-10-CM

## 2025-08-20 DIAGNOSIS — E11.9 CONTROLLED TYPE 2 DIABETES MELLITUS WITHOUT COMPLICATION, WITHOUT LONG-TERM CURRENT USE OF INSULIN: Primary | ICD-10-CM

## 2025-08-20 PROCEDURE — 3044F HG A1C LEVEL LT 7.0%: CPT | Mod: CPTII,,, | Performed by: FAMILY MEDICINE

## 2025-08-20 PROCEDURE — 99214 OFFICE O/P EST MOD 30 MIN: CPT | Mod: S$PBB,,, | Performed by: FAMILY MEDICINE

## 2025-08-20 PROCEDURE — 99999 PR PBB SHADOW E&M-EST. PATIENT-LVL I: CPT | Mod: PBBFAC,,, | Performed by: FAMILY MEDICINE

## 2025-08-20 PROCEDURE — 99211 OFF/OP EST MAY X REQ PHY/QHP: CPT | Mod: PBBFAC,PN | Performed by: FAMILY MEDICINE

## 2025-08-20 PROCEDURE — G2211 COMPLEX E/M VISIT ADD ON: HCPCS | Mod: ,,, | Performed by: FAMILY MEDICINE

## 2025-08-20 PROCEDURE — 3060F POS MICROALBUMINURIA REV: CPT | Mod: CPTII,,, | Performed by: FAMILY MEDICINE

## 2025-08-20 PROCEDURE — 3066F NEPHROPATHY DOC TX: CPT | Mod: CPTII,,, | Performed by: FAMILY MEDICINE

## 2025-08-25 ENCOUNTER — TELEPHONE (OUTPATIENT)
Dept: EMERGENCY MEDICINE | Facility: HOSPITAL | Age: 40
End: 2025-08-25
Payer: MEDICAID

## (undated) DEVICE — EXTRACTOR STONE 4WR NIT 2.2F 1

## (undated) DEVICE — BAG DRAIN URINARY CONT IRRG

## (undated) DEVICE — CATH URETERAL DUAL LUMEN 10FR

## (undated) DEVICE — GLOVE 6.0 PROTEXIS PI BLUE

## (undated) DEVICE — GLOVE BIOGEL ECLIPSE SZ 6

## (undated) DEVICE — TRAY CATH FOL SIL URIMTR 16FR

## (undated) DEVICE — GUIDEWIRE PTFE .038INX145CM

## (undated) DEVICE — GUIDE WIRE MOTION .035 X 150CM

## (undated) DEVICE — WIRE GD LUB STD 3CM .035 150CM

## (undated) DEVICE — SHEATH AMPLATZ OPQ 30FR 30CM

## (undated) DEVICE — DRAPE NEPHRSCPY SURG 72X118IN

## (undated) DEVICE — GOWN POLY REINF BRTH SLV XL

## (undated) DEVICE — BASKET STNE RTRVL HLC 3F 4WR O

## (undated) DEVICE — Device

## (undated) DEVICE — BAG DRAINAGE DISP LF 600ML

## (undated) DEVICE — GUIDEWIRE NITINOL HYBRID 150CM

## (undated) DEVICE — DRAPE MOBILE C-ARM

## (undated) DEVICE — MANIFOLD 4 PORT

## (undated) DEVICE — TUBING MEDI-VAC 20FT .25IN

## (undated) DEVICE — CATH POLLACK OPEN-END FLEXI-TI

## (undated) DEVICE — CATH NEPHROSTOMY DRAINAGE REG

## (undated) DEVICE — SET IRRIGATION WARMING NORMAL

## (undated) DEVICE — GLOVE SURG BIOGEL LATEX SZ 7.5

## (undated) DEVICE — SUT MONOCRYL PLUS UD 3-0 27

## (undated) DEVICE — COVER LIGHT HANDLE 80/CA

## (undated) DEVICE — CATH CONQUEST 40 7X10X4X75

## (undated) DEVICE — FASTENER CATH 12-22FR SM/LRG

## (undated) DEVICE — SUT MONOCRYL 4.0 PS2 CP496G

## (undated) DEVICE — BLLN ULTRAXX NEPHSTMY 6MM 15CM

## (undated) DEVICE — SUT ETHILON 2-0 BLK PS-2

## (undated) DEVICE — SOL IRR NACL .9% 3000ML

## (undated) DEVICE — KIT LITHOCLAST TRILOGY 3.9X440

## (undated) DEVICE — PACK BASIC SETUP SC BR